# Patient Record
Sex: FEMALE | Race: WHITE | NOT HISPANIC OR LATINO | Employment: OTHER | URBAN - METROPOLITAN AREA
[De-identification: names, ages, dates, MRNs, and addresses within clinical notes are randomized per-mention and may not be internally consistent; named-entity substitution may affect disease eponyms.]

---

## 2017-01-09 ENCOUNTER — OFFICE VISIT (OUTPATIENT)
Dept: NEUROLOGY | Facility: CLINIC | Age: 22
End: 2017-01-09
Payer: COMMERCIAL

## 2017-01-09 ENCOUNTER — TELEPHONE (OUTPATIENT)
Dept: NEUROLOGY | Facility: CLINIC | Age: 22
End: 2017-01-09

## 2017-01-09 VITALS
HEART RATE: 131 BPM | BODY MASS INDEX: 28.15 KG/M2 | DIASTOLIC BLOOD PRESSURE: 80 MMHG | SYSTOLIC BLOOD PRESSURE: 131 MMHG | HEIGHT: 66 IN | WEIGHT: 175.13 LBS

## 2017-01-09 DIAGNOSIS — Z71.89 COUNSELING REGARDING GOALS OF CARE: ICD-10-CM

## 2017-01-09 DIAGNOSIS — G36.9 NEUROMYELITIS: ICD-10-CM

## 2017-01-09 DIAGNOSIS — F34.1 DYSTHYMIA: Primary | ICD-10-CM

## 2017-01-09 DIAGNOSIS — Z29.89 PROPHYLACTIC IMMUNOTHERAPY: ICD-10-CM

## 2017-01-09 DIAGNOSIS — F09 COGNITIVE DYSFUNCTION: ICD-10-CM

## 2017-01-09 DIAGNOSIS — Z79.899 ENCOUNTER FOR LONG-TERM (CURRENT) USE OF HIGH-RISK MEDICATION: ICD-10-CM

## 2017-01-09 PROCEDURE — 1159F MED LIST DOCD IN RCRD: CPT | Mod: S$GLB,,, | Performed by: PSYCHIATRY & NEUROLOGY

## 2017-01-09 PROCEDURE — 99215 OFFICE O/P EST HI 40 MIN: CPT | Mod: S$GLB,,, | Performed by: PSYCHIATRY & NEUROLOGY

## 2017-01-09 PROCEDURE — 99999 PR PBB SHADOW E&M-EST. PATIENT-LVL III: CPT | Mod: PBBFAC,,, | Performed by: PSYCHIATRY & NEUROLOGY

## 2017-01-09 RX ORDER — PREDNISONE 2.5 MG/1
TABLET ORAL
Qty: 210 TABLET | Refills: 0 | Status: SHIPPED | OUTPATIENT
Start: 2017-01-09 | End: 2017-05-12

## 2017-01-09 RX ORDER — FLUOXETINE 10 MG/1
10 CAPSULE ORAL DAILY
Qty: 30 CAPSULE | Refills: 11 | Status: SHIPPED | OUTPATIENT
Start: 2017-01-09 | End: 2017-02-06 | Stop reason: DRUGHIGH

## 2017-01-09 NOTE — MR AVS SNAPSHOT
[facility]  [address]  [facility] [address]  Phone: [phone]                  Nohemy Ladd   2017 1:00 PM   Office Visit    Description:  Female : 1995   Provider:  Jannet Guadarrama MD   Department:  [facility]           Reason for Visit     Neurologic Problem           Diagnoses this Visit        Comments    Dysthymia    -  Primary     Neuromyelitis                To Do List           Future Appointments        Provider Department Dept Phone    2017 11:30 AM SCOTT uSllivan, CNS [facility] [phone]      Goals (5 Years of Data)     None      Follow-Up and Disposition     Return in about 3 months (around 2017).       These Medications        Disp Refills Start End    fluoxetine (PROZAC) 10 MG capsule 30 capsule 11 2017    Take 1 capsule (10 mg total) by mouth once daily. - Oral    Pharmacy: [id number] - [place], [place] - [address] Ph #: [phone]       predniSONE (DELTASONE) 2.5 MG tablet 210 tablet 0 2017     Take po as directed as part of steroid taper over the next 3 months;    Pharmacy: [facility] #[id number] - [place], [place] - [address] Ph #: [phone]         [facility]     [facility] On [facility] Nurse Care Line -  Assistance  Registered nurses in the [facility] provide clinical advisement, health education, appointment booking, and other advisory services.  Call for this free service at [phone].             Medications           Message regarding Medications     Verify the changes and/or additions to your medication regime listed below are the same as discussed with your clinician today.  If any of these changes or additions are incorrect, please notify your healthcare provider.        START taking these NEW medications        Refills    fluoxetine (PROZAC) 10 MG capsule 11    Sig: Take 1 capsule (10 mg total) by mouth once daily.    Class:  "Normal    Route: Oral    predniSONE (DELTASONE) 2.5 MG tablet 0    Sig: Take po as directed as part of steroid taper over the next 3 months;    Class: Normal           Verify that the below list of medications is an accurate representation of the medications you are currently taking.  If none reported, the list may be blank. If incorrect, please contact your healthcare provider. Carry this list with you in case of emergency.           Current Medications     cyanocobalamin (VITAMIN B-12) 250 MCG tablet Take 1 tablet (250 mcg total) by mouth once daily.    norgestimate-ethinyl estradiol (ORTHO TRI-CYCLEN,TRI-SPRINTEC) 0.18/0.215/0.25 mg-35 mcg (28) tablet Take 1 tablet by mouth once daily.    pantoprazole (PROTONIX) 40 MG tablet Take 1 tablet (40 mg total) by mouth once daily.    senna-docusate 8.6-50 mg (PERICOLACE) 8.6-50 mg per tablet Take 1 tablet by mouth 2 (two) times daily.    fluoxetine (PROZAC) 10 MG capsule Take 1 capsule (10 mg total) by mouth once daily.    predniSONE (DELTASONE) 2.5 MG tablet Take po as directed as part of steroid taper over the next 3 months;           Clinical Reference Information           Vital Signs - Last Recorded  Most recent update: 1/9/2017  1:01 PM by Isi Lee MA    BP Pulse Ht Wt BMI    131/80 (!) 131 5' 6" (1.676 m) 79.4 kg (175 lb 1.6 oz) 28.26 kg/m2      Blood Pressure          Most Recent Value    BP  131/80      Allergies as of 1/9/2017     Pcn [Penicillins]      Immunizations Administered on Date of Encounter - 1/9/2017     None      Instructions    1. Jan 9-Feb 9th take 7.5mg (3 tabs of 2.5mg tabs) of prednisone daily  2. Feb 9-March 9th take 5mg (2 tabs) of prednisone daily  3. March 9th -April 9th take 2.5mg (1 tab) of prednisone daily  4. April 9th, discontinue prednisone       "

## 2017-01-09 NOTE — TELEPHONE ENCOUNTER
Call returned to Mariluz at pharmacy. Gave her taper dosing as below (from Dr Guadarrama's office note):    Jan 9-Feb 9th take 7.5mg (3 tabs of 2.5mg tabs) of prednisone daily  2. Feb 9-March 9th take 5mg (2 tabs) of prednisone daily  3. March 9th -April 9th take 2.5mg (1 tab) of prednisone daily  4. April 9th, discontinue prednisone

## 2017-01-09 NOTE — PROGRESS NOTES
"Subjective:       Patient ID: Nohemy Ladd is a 21 y.o. female who presents today for a routine clinic visit for NMO.  She is accompanied by her parents;     NMO HPI:  · DMT: Rituxan  · Side effects from DMT? No  · Taking vitamin D3 as recommended? No Dose:   · She feels like her cognition is getting better.   · She completed with NP testing  · She continues to take prednisone 10mg daily.   · Her mood is irritable, they say.   · She is done with physical, occupational, and speech therapy.   Her parents feel she is quicker to emotion that before--laughs more easily; cries more easily; never inappropriate;     SOCIAL HISTORY  Social History   Substance Use Topics    Smoking status: Never Smoker    Smokeless tobacco: None    Alcohol use Yes      Comment: socially     Living arrangements - the patient lives with her parents.  Employment N/A    NMO ROS:  · Fatigue: Yes - Not what it used to be, but improving.   · Sleep Disturbance: Yes - She has been waking up early, eats breakfast, then goes back to sleep.   · Bladder Dysfunction: No  · Bowel Dysfunction: No. She has several BMs, but denies diarrhea.   · Spasticity: No  · Visual Symptoms: No  · Cognitive: Yes - She feels like things are getting better.   · Mood Disorder: Yes - She denies depression and suicidal ideation, but feels like she is "more prone to tears." She has less interest in things, in general. She is not doing counseling.   · Gait Disturbance: No  · Falls: No  · Hand Dysfunction: No  · Pain: Yes - She has some generalized back pain, but more so in the upper back. When she is standing and leans over, she has pain in the lower back. This is new within the past 2-4 weeks.   · Sexual Dysfunction: Not Assessed  · Skin Breakdown: No  · Tremors: No  · Dysphagia:  No  · Dysarthria:  No  · Any un-met adaptive needs? N/A      Objective:        1. 25 foot timed walk: 4.49 seconds today without assist     Neurologic Exam     Mental Status   Oriented to person, " place, and time.   Attention: normal. Concentration: normal.   Speech: speech is normal   Level of consciousness: alert  Knowledge: good.     Cranial Nerves     CN II   Visual acuity: normal    CN III, IV, VI   Pupils are equal, round, and reactive to light.  Extraocular motions are normal.   Right pupil: Shape: regular. Reactivity: brisk.   Left pupil: Shape: regular. Reactivity: brisk.   CN III: no CN III palsy  CN VI: no CN VI palsy  Nystagmus: none     CN V   Right facial sensation deficit: none  Left facial sensation deficit: none    CN VII   Right facial weakness: none  Left facial weakness: none    CN VIII   Hearing: intact    CN IX, X   Palate: symmetric    CN XI   Right sternocleidomastoid strength: normal  Left sternocleidomastoid strength: normal  Right trapezius strength: normal  Left trapezius strength: normal    CN XII   Tongue deviation: none    Motor Exam   Muscle bulk: normal  Overall muscle tone: normal  Right arm pronator drift: absent  Left arm pronator drift: absent    Strength   Strength 5/5 throughout.   Right neck flexion: 5/5  Left neck flexion: 5/5  Right neck extension: 5/5  Left neck extension: 5/5  Right deltoid: 5/5  Left deltoid: 5/5  Right biceps: 5/5  Left biceps: 5/5  Right triceps: 5/5  Left triceps: 5/5  Right wrist flexion: 5/5  Left wrist flexion: 5/5  Right wrist extension: 5/5  Left wrist extension: 5/5  Right interossei: 5/5  Left interossei: 5/5  Right iliopsoas: 5/5  Left iliopsoas: 5/5  Right quadriceps: 5/5  Left quadriceps: 5/5  Right hamstrin/5  Left hamstrin/5  Right anterior tibial: 5/5  Left anterior tibial: 5/5  Right gastroc: 5/5  Left gastroc: 5/5    Sensory Exam   Right arm vibration: normal  Left arm vibration: normal  Right leg vibration: normal  Left leg vibration: normal    Gait, Coordination, and Reflexes     Gait  Gait: normal    Coordination   Romberg: negative  Finger to nose coordination: normal  Heel to shin coordination: normal  Tandem walking  coordination: normal    Tremor   Resting tremor: absent    Reflexes   Right brachioradialis: 1+  Left brachioradialis: 1+  Right biceps: 1+  Left biceps: 1+  Right patellar: 2+  Left patellar: 1+  Right achilles: 2+  Left achilles: 1+  Right plantar: equivocal  Left plantar: equivocal          20/20 OU   Imaging:   MRI Brain 12/2016 improved; no longer any enhancement;     Diagnosis/Assessment/Plan:    1. Neuromyelitis Optica  · Assessment: She is clinically improved, but not her her cognitive baseline.  I expect she will continue to improve significantly over the next year assuming no further relapses  · Imaging:planned December 2017  · Disease Modifying Therapies: continue Rituxan and low dose prednisone; tapering schedule for prednisone given--should be off by April 9th. Pt/family given info on the [facility]    2. NMO Symptom Assessment / Management  · Cognitive: NP testing results done by Dr Painting were discussed;   · Mood Disorder: will start Prozac 10mg/day; side effects discussed;       Over 50% of this 40 minute visit was spent in direct face to face counseling of the patient about her NMO and the management of her various symptoms.      Follow up with ASIA Woodson in 3 mo    Dysthymia  -     fluoxetine (PROZAC) 10 MG capsule; Take 1 capsule (10 mg total) by mouth once daily.  Dispense: 30 capsule; Refill: 11    Neuromyelitis  -     predniSONE (DELTASONE) 2.5 MG tablet; Take po as directed as part of steroid taper over the next 3 months;  Dispense: 210 tablet; Refill: 0

## 2017-01-09 NOTE — PATIENT INSTRUCTIONS
1. Jan 9-Feb 9th take 7.5mg (3 tabs of 2.5mg tabs) of prednisone daily  2. Feb 9-March 9th take 5mg (2 tabs) of prednisone daily  3. March 9th -April 9th take 2.5mg (1 tab) of prednisone daily  4. April 9th, discontinue prednisone

## 2017-01-09 NOTE — TELEPHONE ENCOUNTER
----- Message from William Younger sent at 1/9/2017  3:22 PM CST -----  Contact: [facility] [phone]  Caller is calling to get direction on the medication ( predniSONE (DELTASONE) 2.5 MG tablet)

## 2017-02-02 ENCOUNTER — NURSE TRIAGE (OUTPATIENT)
Dept: ADMINISTRATIVE | Facility: CLINIC | Age: 22
End: 2017-02-02

## 2017-02-02 NOTE — TELEPHONE ENCOUNTER
"    Reason for Disposition   Nursing judgment   [1] Caller requests to speak ONLY to PCP AND [2] URGENT question    Protocols used:  NO GUIDELINE OR REFERENCE [id number], ST PCP CALL - NO TRIAGE-JIM    Nohemy's mom, Donna, called to say she has been receiving texts from Nohemy while she has been at work (Nohemy is a  and is working right now) to say "she does not feel right.  I have hiccups, my head is tingling, and my vision is changing."  Nohemy has neuromyelitis optica, and has been doing well until today.  Donna wants to speak with Dr Guadarrama now, to get advice on what to do. Dr Guadarrama paged, briefed on Nohemy's symptoms and Donna's request, and she said she will call Donna back as soon as she is able to.  Message to Dr Guadarrama. Please contact caller directly with any additional care advice.    "

## 2017-02-06 ENCOUNTER — TELEPHONE (OUTPATIENT)
Dept: NEUROLOGY | Facility: CLINIC | Age: 22
End: 2017-02-06

## 2017-02-06 DIAGNOSIS — G36.0 NEUROMYELITIS OPTICA: Primary | ICD-10-CM

## 2017-02-06 DIAGNOSIS — F34.1 DYSTHYMIA: ICD-10-CM

## 2017-02-06 DIAGNOSIS — F32.89 OTHER DEPRESSION: Primary | ICD-10-CM

## 2017-02-06 NOTE — TELEPHONE ENCOUNTER
Nohemy started taking the Prozac 10 mg one month ago and she does not feel a difference. She is requesting an increase.

## 2017-02-06 NOTE — TELEPHONE ENCOUNTER
----- Message from Claire Nunes sent at 2/6/2017 10:02 AM CST -----  Contact: Bridget [phone]  Patient is calling to request a higher dosage on her fluoxetine (PROZAC) 10 MG capsule.       [id number] - [place], [place] - [address]  [address] [place] [address]  Phone: [phone] Fax: [phone]

## 2017-02-07 RX ORDER — FLUOXETINE HYDROCHLORIDE 20 MG/1
20 CAPSULE ORAL DAILY
Qty: 30 CAPSULE | Refills: 11 | Status: SHIPPED | OUTPATIENT
Start: 2017-02-07 | End: 2017-05-12

## 2017-02-13 ENCOUNTER — TELEPHONE (OUTPATIENT)
Dept: NEUROLOGY | Facility: CLINIC | Age: 22
End: 2017-02-13

## 2017-02-13 NOTE — TELEPHONE ENCOUNTER
----- Message from Claire Nunes sent at 2/13/2017 11:44 AM CST -----  Contact: Donna (mom) [phone]  Donna is calling to speak with nurse about some changes in her daughters behavior, please call

## 2017-02-14 NOTE — TELEPHONE ENCOUNTER
----- Message from Brisa Abel sent at 2/13/2017  4:30 PM CST -----  Contact: jose (mother) @ [phone]  pts mother says she is returning Augusta's call.

## 2017-02-14 NOTE — TELEPHONE ENCOUNTER
Call returned to mother (Donna). She states she is losing hope with Nohemy. She states she is still having some behavioral issues--same as the ones from before but increasing in severity. She has been extremely aggressive (not physically), cries easily, lashing out at family, obsessive, and clingy with her mother. She absolutely refuses to go to counseling, according to Donna. She has also seen no change in her mood since starting the Prozac in January.    Pt is working and driving at this time.    I informed Donna that there may not be a lot that this office can do if Nohemy is refusing to talk about her issues. We discussed that medication alone will only provide some relief without pt going to therapy of some sort. I offered reassurance and listened to her venting. I also informed her that this would be communicated to Dr Guadarrama, Juana Pedroza, and Dr Painting.

## 2017-02-15 ENCOUNTER — LAB VISIT (OUTPATIENT)
Dept: LAB | Facility: HOSPITAL | Age: 22
End: 2017-02-15
Payer: COMMERCIAL

## 2017-02-15 DIAGNOSIS — G36.0 NEUROMYELITIS OPTICA: ICD-10-CM

## 2017-02-15 LAB
ANISOCYTOSIS BLD QL SMEAR: SLIGHT
BASOPHILS NFR BLD: 1 %
DIFFERENTIAL METHOD: NORMAL
EOSINOPHIL NFR BLD: 0 %
ERYTHROCYTE [DISTWIDTH] IN BLOOD BY AUTOMATED COUNT: 14.1 %
HBV CORE AB SERPL QL IA: NEGATIVE
HBV SURFACE AB SER-ACNC: NEGATIVE M[IU]/ML
HBV SURFACE AG SERPL QL IA: NEGATIVE
HCT VFR BLD AUTO: 39.5 %
HCV AB SERPL QL IA: NEGATIVE
HEPATITIS A ANTIBODY, IGG: NEGATIVE
HGB BLD-MCNC: 12.9 G/DL
LYMPHOCYTES NFR BLD: 21 %
MCH RBC QN AUTO: 27.1 PG
MCHC RBC AUTO-ENTMCNC: 32.7 %
MCV RBC AUTO: 83 FL
METAMYELOCYTES NFR BLD MANUAL: 4 %
MONOCYTES NFR BLD: 6 %
MYELOCYTES NFR BLD MANUAL: 1 %
NEUTROPHILS NFR BLD: 66 %
NEUTS BAND NFR BLD MANUAL: 1 %
OVALOCYTES BLD QL SMEAR: NORMAL
PLATELET # BLD AUTO: 222 K/UL
PMV BLD AUTO: 9.5 FL
POIKILOCYTOSIS BLD QL SMEAR: SLIGHT
POLYCHROMASIA BLD QL SMEAR: NORMAL
RBC # BLD AUTO: 4.76 M/UL
WBC # BLD AUTO: 9.35 K/UL

## 2017-02-15 PROCEDURE — 86706 HEP B SURFACE ANTIBODY: CPT

## 2017-02-15 PROCEDURE — 86704 HEP B CORE ANTIBODY TOTAL: CPT

## 2017-02-15 PROCEDURE — 86803 HEPATITIS C AB TEST: CPT

## 2017-02-15 PROCEDURE — 36415 COLL VENOUS BLD VENIPUNCTURE: CPT

## 2017-02-15 PROCEDURE — 85027 COMPLETE CBC AUTOMATED: CPT

## 2017-02-15 PROCEDURE — 86356 MONONUCLEAR CELL ANTIGEN: CPT

## 2017-02-15 PROCEDURE — 85007 BL SMEAR W/DIFF WBC COUNT: CPT

## 2017-02-15 PROCEDURE — 87340 HEPATITIS B SURFACE AG IA: CPT

## 2017-02-15 PROCEDURE — 86790 VIRUS ANTIBODY NOS: CPT

## 2017-02-21 LAB — CD20 CELLS NFR SPEC: NORMAL %

## 2017-02-22 ENCOUNTER — TELEPHONE (OUTPATIENT)
Dept: NEUROLOGY | Facility: CLINIC | Age: 22
End: 2017-02-22

## 2017-02-22 NOTE — TELEPHONE ENCOUNTER
----- Message from Claire Nunes sent at 2/22/2017  3:28 PM CST -----  Contact: Donna ([facility] [place] [place]) [phone]  Donna is calling to speak with nurse about some concerning systems.

## 2017-02-22 NOTE — TELEPHONE ENCOUNTER
"I spoke with Nohemy who said she has been having the hiccups a lot lately. She said she hiccups once daily for the last 1-2 weeks. When asked how long each episode lasts, she said it's just "once." This happened before when she was hospitalized this last time. Last night she got a bad headache, but she took Ibuprofen and this relieved. She vomited once today. She said she is feeling better now. No signs of infection.     Reviewed symptoms with Dr. Guadarrama. Reassured Nohemy no need to worry at this time as hiccups only happened once a day and headache/vomiting are isolated incidents. Encouraged Nohemy to call if symptoms reoccur or get worse. Nohemy verbalized understanding.   "

## 2017-03-07 ENCOUNTER — TELEPHONE (OUTPATIENT)
Dept: NEUROLOGY | Facility: CLINIC | Age: 22
End: 2017-03-07

## 2017-03-08 ENCOUNTER — TELEPHONE (OUTPATIENT)
Dept: NEUROLOGY | Facility: CLINIC | Age: 22
End: 2017-03-08
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
## 2017-03-08 NOTE — TELEPHONE ENCOUNTER
----- Message from Jessa Contreras sent at 3/8/2017 10:55 AM CST -----  Contact: Mother  Need to speak with someone regarding patient condition. She also has a question regarding treatments.     Call: [phone]

## 2017-03-15 ENCOUNTER — TELEPHONE (OUTPATIENT)
Dept: NEUROLOGY | Facility: CLINIC | Age: 22
End: 2017-03-15

## 2017-03-15 RX ORDER — HEPARIN 100 UNIT/ML
500 SYRINGE INTRAVENOUS
Status: CANCELLED | OUTPATIENT
Start: 2017-04-01

## 2017-03-15 RX ORDER — SODIUM CHLORIDE 0.9 % (FLUSH) 0.9 %
10 SYRINGE (ML) INJECTION
Status: CANCELLED | OUTPATIENT
Start: 2017-04-01

## 2017-03-15 RX ORDER — ACETAMINOPHEN 325 MG/1
650 TABLET ORAL
Status: CANCELLED | OUTPATIENT
Start: 2017-04-01

## 2017-03-15 RX ORDER — FAMOTIDINE 10 MG/ML
20 INJECTION INTRAVENOUS
Status: CANCELLED | OUTPATIENT
Start: 2017-04-01

## 2017-03-23 ENCOUNTER — LAB VISIT (OUTPATIENT)
Dept: LAB | Facility: HOSPITAL | Age: 22
End: 2017-03-23
Payer: COMMERCIAL

## 2017-03-23 DIAGNOSIS — G36.0 NEUROMYELITIS OPTICA: ICD-10-CM

## 2017-03-23 LAB
ANISOCYTOSIS BLD QL SMEAR: SLIGHT
BASOPHILS # BLD AUTO: ABNORMAL K/UL
BASOPHILS NFR BLD: 2 %
DIFFERENTIAL METHOD: ABNORMAL
EOSINOPHIL # BLD AUTO: ABNORMAL K/UL
EOSINOPHIL NFR BLD: 1 %
ERYTHROCYTE [DISTWIDTH] IN BLOOD BY AUTOMATED COUNT: 14.9 %
HCT VFR BLD AUTO: 38.1 %
HGB BLD-MCNC: 12.2 G/DL
LYMPHOCYTES # BLD AUTO: ABNORMAL K/UL
LYMPHOCYTES NFR BLD: 18 %
MCH RBC QN AUTO: 26.6 PG
MCHC RBC AUTO-ENTMCNC: 32 %
MCV RBC AUTO: 83 FL
METAMYELOCYTES NFR BLD MANUAL: 2 %
MONOCYTES # BLD AUTO: ABNORMAL K/UL
MONOCYTES NFR BLD: 6 %
NEUTROPHILS NFR BLD: 70 %
NEUTS BAND NFR BLD MANUAL: 1 %
OVALOCYTES BLD QL SMEAR: ABNORMAL
PLATELET # BLD AUTO: 265 K/UL
PMV BLD AUTO: 9.7 FL
POIKILOCYTOSIS BLD QL SMEAR: SLIGHT
POLYCHROMASIA BLD QL SMEAR: ABNORMAL
RBC # BLD AUTO: 4.59 M/UL
WBC # BLD AUTO: 10.71 K/UL

## 2017-03-23 PROCEDURE — 36415 COLL VENOUS BLD VENIPUNCTURE: CPT

## 2017-03-23 PROCEDURE — 86356 MONONUCLEAR CELL ANTIGEN: CPT

## 2017-03-23 PROCEDURE — 85007 BL SMEAR W/DIFF WBC COUNT: CPT

## 2017-03-23 PROCEDURE — 85027 COMPLETE CBC AUTOMATED: CPT

## 2017-03-28 NOTE — TELEPHONE ENCOUNTER
Nohemy is scheduled for her next Rituxan infusion on 4/21 at [facility]. Her last infusion was on 10/18 and 11/01. Labs completed 3/23, WBC 10.71, ALC 1927, Rituxan sensitivity still pending, per [facility] Hepatitis labs negative. Therapy plan signed by Dr. Guadarrama.    Approved   Primary Insurance:  [facility] CHO[employer] PLUS   Authorization #:[id number]

## 2017-03-31 LAB — CD20 CELLS NFR SPEC: NORMAL %

## 2017-04-21 ENCOUNTER — INFUSION (OUTPATIENT)
Dept: INFUSION THERAPY | Facility: HOSPITAL | Age: 22
End: 2017-04-21
Attending: INTERNAL MEDICINE
Payer: COMMERCIAL

## 2017-04-21 VITALS
TEMPERATURE: 98 F | HEART RATE: 101 BPM | BODY MASS INDEX: 30.53 KG/M2 | SYSTOLIC BLOOD PRESSURE: 117 MMHG | WEIGHT: 190 LBS | DIASTOLIC BLOOD PRESSURE: 59 MMHG | RESPIRATION RATE: 16 BRPM | HEIGHT: 66 IN

## 2017-04-21 DIAGNOSIS — G36.0 NEUROMYELITIS OPTICA: Primary | ICD-10-CM

## 2017-04-21 PROCEDURE — 96375 TX/PRO/DX INJ NEW DRUG ADDON: CPT

## 2017-04-21 PROCEDURE — 25000003 PHARM REV CODE 250: Performed by: PSYCHIATRY & NEUROLOGY

## 2017-04-21 PROCEDURE — 96413 CHEMO IV INFUSION 1 HR: CPT

## 2017-04-21 PROCEDURE — 96415 CHEMO IV INFUSION ADDL HR: CPT

## 2017-04-21 PROCEDURE — 96367 TX/PROPH/DG ADDL SEQ IV INF: CPT

## 2017-04-21 PROCEDURE — 63600175 PHARM REV CODE 636 W HCPCS: Performed by: PSYCHIATRY & NEUROLOGY

## 2017-04-21 RX ORDER — ACETAMINOPHEN 325 MG/1
650 TABLET ORAL
Status: CANCELLED | OUTPATIENT
Start: 2017-04-21

## 2017-04-21 RX ORDER — SODIUM CHLORIDE 0.9 % (FLUSH) 0.9 %
10 SYRINGE (ML) INJECTION
Status: CANCELLED | OUTPATIENT
Start: 2017-04-21

## 2017-04-21 RX ORDER — HEPARIN 100 UNIT/ML
500 SYRINGE INTRAVENOUS
Status: CANCELLED | OUTPATIENT
Start: 2017-04-21

## 2017-04-21 RX ORDER — FAMOTIDINE 10 MG/ML
20 INJECTION INTRAVENOUS
Status: COMPLETED | OUTPATIENT
Start: 2017-04-21 | End: 2017-04-21

## 2017-04-21 RX ORDER — FAMOTIDINE 10 MG/ML
20 INJECTION INTRAVENOUS
Status: CANCELLED | OUTPATIENT
Start: 2017-04-21

## 2017-04-21 RX ORDER — ACETAMINOPHEN 325 MG/1
650 TABLET ORAL
Status: COMPLETED | OUTPATIENT
Start: 2017-04-21 | End: 2017-04-21

## 2017-04-21 RX ADMIN — RITUXIMAB 1000 MG: 10 INJECTION, SOLUTION INTRAVENOUS at 10:04

## 2017-04-21 RX ADMIN — DIPHENHYDRAMINE HYDROCHLORIDE 50 MG: 50 INJECTION, SOLUTION INTRAMUSCULAR; INTRAVENOUS at 09:04

## 2017-04-21 RX ADMIN — DEXTROSE: 50 INJECTION, SOLUTION INTRAVENOUS at 09:04

## 2017-04-21 RX ADMIN — ACETAMINOPHEN 650 MG: 325 TABLET ORAL at 09:04

## 2017-04-21 RX ADMIN — FAMOTIDINE 20 MG: 10 INJECTION INTRAVENOUS at 10:04

## 2017-04-21 NOTE — MR AVS SNAPSHOT
"Patient Information     Patient Name Sex Nohemy Breaux Female 1995      Visit Information        Provider Department Dep Phone Center    2017 9:00 AM [facility] [phone] Alexis Ocampo      Patient Instructions     None      Your Current Medications Are     cyanocobalamin (VITAMIN B-12) 250 MCG tablet    fluoxetine (PROZAC) 20 MG capsule    norgestimate-ethinyl estradiol (ORTHO TRI-CYCLEN,TRI-SPRINTEC) 0.18/0.215/0.25 mg-35 mcg (28) tablet    pantoprazole (PROTONIX) 40 MG tablet    predniSONE (DELTASONE) 2.5 MG tablet    senna-docusate 8.6-50 mg (PERICOLACE) 8.6-50 mg per tablet      Facility-Administered Medications     acetaminophen tablet 650 mg    diphenhydramine IVPB 50 mg    famotidine (PF) 20 mg/2 mL injection 20 mg    methylPREDNISolone sodium succinate (SOLU-MEDROL) 250 mg in dextrose 5 % 100 mL IVPB    rituximab (RITUXAN) 1,000 mg in sodium chloride 0.9% 1,000 mL infusion (conc: 1 mg/mL)      Appointments for Next Year     2017 10:30 AM ESTABLISHED PATIENT (60 min.) [facility] SCOTT Sullivan, CNS    Arrive at check-in approximately 15 minutes before your scheduled appointment time. Bring all outside medical records and imaging, along with a list of your current medications and insurance card.     Clinic tower 6th floor         Default Flowsheet Data (last 24 hours)      Amb Complex Vitals Florentino        17 1418 17 1231 17 1159 17 1127 17 1100    Measurements    BP --   rituxan completed 113/70   inc rituxan 114/65   rituxan inc to 300 114/69   rituxan increased to 200     Pulse  90 95 91     Resp  16  16        17 1053 17 0900             Measurements    Weight  86.2 kg (190 lb)       Height  5' 6" (1.676 m)       BSA (Calculated - sq m)  2 sq meters       BMI (Calculated)  30.7       /68   start rituxan at 100ml/hr 118/66       Temp  98.2 °F (36.8 °C)       Pulse 87 (!)  112       " Resp 20 20               Allergies     Pcn [Penicillins] Hives      Medications You Received from 04/20/2017 1419 to 04/21/2017 1419        Date/Time Order Dose Route Action     04/21/2017 0923 acetaminophen tablet 650 mg 650 mg Oral Given     04/21/2017 0956 diphenhydramine IVPB 50 mg 50 mg Intravenous New Bag     04/21/2017 1050 famotidine (PF) 20 mg/2 mL injection 20 mg 20 mg Intravenous Given     04/21/2017 0923 methylPREDNISolone sodium succinate (SOLU-MEDROL) 250 mg in dextrose 5 % 100 mL IVPB   Intravenous New Bag     04/21/2017 1054 rituximab (RITUXAN) 1,000 mg in sodium chloride 0.9% 1,000 mL infusion (conc: 1 mg/mL) 1,000 mg Intravenous New Bag      Current Discharge Medication List     Cannot display discharge medications since this is not an admission.

## 2017-04-21 NOTE — PLAN OF CARE
Problem: Chemotherapy Effects (Adult)  Goal: Signs and Symptoms of Listed Potential Problems Will be Absent, Minimized or Managed (Chemotherapy Effects)  Signs and symptoms of listed potential problems will be absent, minimized or managed by discharge/transition of care (reference Chemotherapy Effects (Adult) CPG).  Outcome: Ongoing (interventions implemented as appropriate)  Labs , hx, and medications reviewed. Assessment completed. Discussed plan of care with patient. Patient in agreement. VSS.  Chair reclined and warm blanket and snack offered. Will cont to monitor

## 2017-04-21 NOTE — NURSING
1020 pt completed benadryl IV & solumedrol prior - after benadryl developed rash to L arm (where PIV located). Appears hives like but not adjacent to IV or dressing & not along bobby. No SOB or other allergic complaints. IVF NS infusing & notified Ashley SARAVIA @ Dr Guadarrama office. Wait 20 min & continue with tx. Notify with any further problems.   1050 rash/hives completely resolved. Continued with pre meds & rituxan as ordered

## 2017-04-21 NOTE — PLAN OF CARE
Problem: Patient Care Overview  Goal: Discharge Needs Assessment  Outcome: Ongoing (interventions implemented as appropriate)  Pt tolerated rituxant without adverse effects. VSS. Provided AVS & verbalized understanding of RTC date. DC with family ambulating independently.

## 2017-04-28 ENCOUNTER — OFFICE VISIT (OUTPATIENT)
Dept: NEUROLOGY | Facility: CLINIC | Age: 22
End: 2017-04-28
Payer: COMMERCIAL

## 2017-04-28 VITALS
HEIGHT: 66 IN | HEART RATE: 112 BPM | SYSTOLIC BLOOD PRESSURE: 124 MMHG | WEIGHT: 188 LBS | BODY MASS INDEX: 30.22 KG/M2 | DIASTOLIC BLOOD PRESSURE: 94 MMHG

## 2017-04-28 DIAGNOSIS — R53.83 FATIGUE, UNSPECIFIED TYPE: ICD-10-CM

## 2017-04-28 DIAGNOSIS — F42.9 OBSESSIVE-COMPULSIVE DISORDER, UNSPECIFIED TYPE: ICD-10-CM

## 2017-04-28 DIAGNOSIS — F32.A DEPRESSION, UNSPECIFIED DEPRESSION TYPE: ICD-10-CM

## 2017-04-28 DIAGNOSIS — F41.9 ANXIETY: ICD-10-CM

## 2017-04-28 DIAGNOSIS — Z71.89 COUNSELING REGARDING GOALS OF CARE: ICD-10-CM

## 2017-04-28 DIAGNOSIS — G36.0 NEUROMYELITIS OPTICA: Primary | ICD-10-CM

## 2017-04-28 DIAGNOSIS — Z29.89 PROPHYLACTIC IMMUNOTHERAPY: ICD-10-CM

## 2017-04-28 DIAGNOSIS — Z79.899 HIGH RISK MEDICATION USE: ICD-10-CM

## 2017-04-28 DIAGNOSIS — L29.9 ITCHING: ICD-10-CM

## 2017-04-28 PROCEDURE — 99354 PR PROLONGED SVC, OUPT, 1ST HR: CPT | Mod: S$GLB,,, | Performed by: CLINICAL NURSE SPECIALIST

## 2017-04-28 PROCEDURE — 99215 OFFICE O/P EST HI 40 MIN: CPT | Mod: S$GLB,,, | Performed by: CLINICAL NURSE SPECIALIST

## 2017-04-28 PROCEDURE — 1160F RVW MEDS BY RX/DR IN RCRD: CPT | Mod: S$GLB,,, | Performed by: CLINICAL NURSE SPECIALIST

## 2017-04-28 PROCEDURE — 99999 PR PBB SHADOW E&M-EST. PATIENT-LVL III: CPT | Mod: PBBFAC,,, | Performed by: CLINICAL NURSE SPECIALIST

## 2017-04-28 RX ORDER — FLUOXETINE HYDROCHLORIDE 40 MG/1
40 CAPSULE ORAL DAILY
Qty: 30 CAPSULE | Refills: 5 | Status: SHIPPED | OUTPATIENT
Start: 2017-04-28 | End: 2017-07-24 | Stop reason: SDUPTHER

## 2017-04-28 RX ORDER — AMANTADINE HYDROCHLORIDE 100 MG/1
TABLET ORAL
Qty: 60 TABLET | Refills: 3 | Status: SHIPPED | OUTPATIENT
Start: 2017-04-28 | End: 2017-07-28

## 2017-04-28 RX ORDER — GABAPENTIN 100 MG/1
100 CAPSULE ORAL NIGHTLY
Qty: 30 CAPSULE | Refills: 5 | Status: SHIPPED | OUTPATIENT
Start: 2017-04-28 | End: 2017-09-21

## 2017-04-28 NOTE — PROGRESS NOTES
Subjective:       Patient ID: Nohemy Ladd is a 22 y.o. female who presents today for a routine clinic visit for NMO. She was last seen by Dr. Guadarrama in January. The history has been provided by the patient. She is accompanied by her parents.     NMO HPI:  · DMT: Rituxan last Friday   · Side effects from DMT? Yes - She had a rash right after she got Benadryl during her infusion  · Taking vitamin D3 as recommended? No   · She does not feel like Prozac is helping with her depression.   · She has not had a menstrual cycle since last September. She is following up with her OBGYN about this.   · She reports back pain and neck pain after her Rituxan infusion that lasted for a few days and persists somewhat today. She feels sore.   · She has tapered off of prednisone.   · She has itching all over her body, but denies any rash. She itches more when she is in public. Her parents wonder if anxiety provokes this.     SOCIAL HISTORY  Social History   Substance Use Topics    Smoking status: Never Smoker    Smokeless tobacco: None    Alcohol use Yes      Comment: socially     Living arrangements - the patient lives with their family.  Employment: works at [employer] a [employer], and also has a job at a [employer].     NMO ROS:  · Fatigue: Yes - She has fatigue that makes her feel unmotivated to do things.   · Sleep Disturbance: Yes - She wakes up really early in the morning, but is able to go back to sleep. She goes to bed around 10pm and sleeps throughout the day (not as much as she did previously)  · Bladder Dysfunction: Yes - She has more urinary frequency. No recent UTIs.   · Bowel Dysfunction: Yes - She has had increased gas lately. She has had two accidents during which she passes gas, and some stool comes out.  She also has more frequent bowel movements.   · Spasticity: No  · Visual Symptoms: No  · Cognitive: Yes - Her short term memory is impacted. She sometimes has trouble with word finding.   · Mood  Disorder: Yes - She does not feel like Prozac 20mg is helping. She has also started counseling. Her family reports a lot of emotional lability. She is also exhibiting some increased OCD tendencies.   · Pain: Yes - She has some foot pain.         Objective:        1. 25 foot timed walk: was 4.49 seconds at last visit without assist     Neurologic Exam     Mental Status   Oriented to person, place, and time.   Attention: normal. Concentration: normal.   Speech: speech is normal   Level of consciousness: alert  Knowledge: good.     Cranial Nerves     CN III, IV, VI   Pupils are equal, round, and reactive to light.  Extraocular motions are normal.   Right pupil: Shape: regular. Reactivity: brisk.   Left pupil: Shape: regular. Reactivity: brisk.   CN III: no CN III palsy  CN VI: no CN VI palsy  Nystagmus: none     CN V   Right facial sensation deficit: none  Left facial sensation deficit: none    CN VII   Right facial weakness: none  Left facial weakness: none    CN VIII   Hearing: intact    CN IX, X   Palate: symmetric    CN XI   Right sternocleidomastoid strength: normal  Left sternocleidomastoid strength: normal  Right trapezius strength: normal  Left trapezius strength: normal    CN XII   Tongue deviation: none    Motor Exam   Muscle bulk: normal  Overall muscle tone: normal    Strength   Strength 5/5 throughout.   Right neck flexion: 5/5  Left neck flexion: 5/5  Right neck extension: 5/5  Left neck extension: 5/5  Right deltoid: 5/5  Left deltoid: 5/5  Right biceps: 5/5  Left biceps: 5/5  Right triceps: 5/5  Left triceps: 5/5  Right wrist flexion: 5/5  Left wrist flexion: 5/5  Right wrist extension: 5/5  Left wrist extension: 5/5  Right interossei: 5/5  Left interossei: 5/5  Right iliopsoas: 5/5  Left iliopsoas: 5/5  Right quadriceps: 5/5  Left quadriceps: 5/5  Right hamstrin/5  Left hamstrin/5  Right anterior tibial: 5/5  Left anterior tibial: 5/5  Right gastroc: 5/5  Left gastroc: 5/5    Sensory Exam    Right arm vibration: normal  Left arm vibration: normal  Right leg vibration: normal  Left leg vibration: normal    Gait, Coordination, and Reflexes     Gait  Gait: normal    Coordination   Romberg: negative  Finger to nose coordination: normal  Tandem walking coordination: normal    Tremor   Resting tremor: absent    Reflexes   Right brachioradialis: 1+  Left brachioradialis: 1+  Right biceps: 1+  Left biceps: 1+  Right patellar: 1+  Left patellar: 1+  Right achilles: 1+  Left achilles: 1+  Right plantar: equivocal  Left plantar: equivocal              Labs:   CD19, 20=0 on 3/23/17.     Lab Results   Component Value Date    WBC 10.71 03/23/2017    HGB 12.2 03/23/2017    HCT 38.1 03/23/2017    MCV 83 03/23/2017     03/23/2017         Diagnosis/Assessment/Plan:    1. Neuromyelitis Optica  · Assessment: Physically, Nohemy is doing well. She continues to struggle with cognitive impairment, mostly with short-term memory. She was very tearful throughout our time today. Her parents are concerned about the personality changes she has exhibited over the past several months (very quick to anger, sometimes violent with siblings and mother, cries easily). We discussed that Nohemy has threatened suicide recently, but today, she denies that she has a plan or a means to carry it out. Her parents have medical power of , and I advised them that if Nohemy actively threatens suicide or seems to be a threat of any kind to herself or others, they should call 911 and have her brought to the emergency room. Her parents and Nohemy verbalized understanding of this. We also discussed some obsessive compulsive tendencies that she has been exhibiting, such as counting the number of times she swirls her makeup brush on her blush and then on her cheek, being sure that electric items in the home are unplugged, turning the radio dial a certain number of times, etc). Nohemy has had a major life change with the diagnosis of  neuromyelitis optica. She has depression related to this. She has also gained a significant amount of weight because of steroid use (she has been totally weaned as of 2.5 weeks ago), which is upsetting to her. Her parents are in the process of getting . She feels unsupported by her siblings. She and her parents have very different views of her life and mood, and my sense is that Nohemy feels misunderstood, while I can see that parents are extremely concerned about her well-being. I recommended family counseling, and they will look into this. I also think that Nohemy needs to establish care with a psychiatrist so that these multiple issues can be addressed properly. She seemed resistant to this initially, but was more open-minded towards the end of our visit. I will speak with Dr. Painting in neuropsychology to see if he has a recommendation of someone in our department here.   · Imaging: MRI brain planned for December, but may consider doing sooner if cognitive issues do not improve.   · Disease Modifying Therapies:Continue Rituxan. She is due for next infusion in October. It has been burdensome for them to come here to [facility] for required labs, so I have provided her with orders for CBC and Rituxan sensitivity due in May. She will take these to her local lab.     2. NMO Symptom Assessment / Management  · Fatigue: Will start amantadine 100mg in the morning and 100mg at noon if needed.   · Bladder Dysfunction: Will monitor.   · Bowel Dysfunction: Suspect that frequent bowel movements are a result of prednisone, but will need to look into this more.   · Cognitive: She continues to experience short-term memory issues. Will discuss with Dr. Guadarrama.   · Mood Disorder: Prozac increased to 40mg. I have entered a referral for the patient for psychiatry here at [facility]. She has multiple issues that need to be addressed (depression, anger, emotional lability, OCD). I will ask Dr. Chappell and Dr. Painting for a recommendation. She  also expressed interested in trying Celexa because her mother takes it and it has helped to control her weight. If Prozac increase is not helpful, we may consider this.   · Pain/Sensory: She has itching all over her body, but denies rash. Her mother will try using a different laundry detergent, but I suspect that this is neuropathic. Gabapentin 100mg at bedtime Rx sent to pharmacy.     Over 50% of this 90 minute visit was spent in direct face to face counseling of the patient about NMO, her mood, and the appropriate referrals made.  The patient and her parents agree with the plan of care. She will follow up with Dr. Guadarrama in 3 months.       There are no diagnoses linked to this encounter.

## 2017-04-28 NOTE — Clinical Note
Nohemy is not taking Vitamin D. Doesn't look like we have ever checked it. Do you think it's not necessary because she's NMO?

## 2017-04-28 NOTE — Clinical Note
Nohemy's parents are concerned that her cognition does not seem to be improving. Do you think it would be wise to get another MRI sooner than December?

## 2017-05-01 ENCOUNTER — TELEPHONE (OUTPATIENT)
Dept: NEUROLOGY | Facility: CLINIC | Age: 22
End: 2017-05-01

## 2017-05-01 DIAGNOSIS — F32.A DEPRESSION, UNSPECIFIED DEPRESSION TYPE: ICD-10-CM

## 2017-05-01 DIAGNOSIS — F42.9 OBSESSIVE-COMPULSIVE DISORDER, UNSPECIFIED TYPE: ICD-10-CM

## 2017-05-01 DIAGNOSIS — G36.0 NEUROMYELITIS OPTICA: Primary | ICD-10-CM

## 2017-05-04 ENCOUNTER — TELEPHONE (OUTPATIENT)
Dept: NEUROLOGY | Facility: CLINIC | Age: 22
End: 2017-05-04

## 2017-05-04 DIAGNOSIS — G36.0 NEUROMYELITIS OPTICA: Primary | ICD-10-CM

## 2017-05-04 NOTE — TELEPHONE ENCOUNTER
----- Message from Jannet Guadarrama MD sent at 5/4/2017  8:49 AM CDT -----  yes  ----- Message -----     From: SCOTT Sullivan, CNS     Sent: 5/3/2017   4:56 PM       To: Jannet Guadarrama MD, #    6 months from prior, right? This would be June.   ----- Message -----     From: Jannet Guadarrama MD     Sent: 5/2/2017   5:48 PM       To: SCOTT Sullivan, CNS    I don't think its necessary, but might give them peace of mind which would likely be valuable in this situation.  So maybe a 6 mo interval MRI  ----- Message -----     From: SCOTT Sullivan, CNS     Sent: 4/28/2017   2:22 PM       To: Jannet Guadarrama MD    Nohemy's parents are concerned that her cognition does not seem to be improving. Do you think it would be wise to get another MRI sooner than December?

## 2017-05-04 NOTE — TELEPHONE ENCOUNTER
Message left for Nohemy's mom to call me back regarding setting up 6 month brain MRI. Will also send lab orders in the mail for Vitamin D.

## 2017-05-04 NOTE — TELEPHONE ENCOUNTER
Nohemy has appt with psychiatry on 5/12. After follow for June is scheduled, we will plan to set up brain MRI for the same day. Her mother is aware. Order for Vitamin D lab sent to patient.

## 2017-05-09 ENCOUNTER — TELEPHONE (OUTPATIENT)
Dept: NEUROLOGY | Facility: CLINIC | Age: 22
End: 2017-05-09

## 2017-05-12 ENCOUNTER — OFFICE VISIT (OUTPATIENT)
Dept: PSYCHIATRY | Facility: CLINIC | Age: 22
End: 2017-05-12
Payer: COMMERCIAL

## 2017-05-12 VITALS
SYSTOLIC BLOOD PRESSURE: 121 MMHG | HEART RATE: 120 BPM | DIASTOLIC BLOOD PRESSURE: 65 MMHG | WEIGHT: 191 LBS | HEIGHT: 66 IN | BODY MASS INDEX: 30.7 KG/M2

## 2017-05-12 DIAGNOSIS — F43.23 ADJUSTMENT DISORDER WITH MIXED ANXIETY AND DEPRESSED MOOD: Primary | ICD-10-CM

## 2017-05-12 PROCEDURE — 99999 PR PBB SHADOW E&M-EST. PATIENT-LVL II: CPT | Mod: PBBFAC,,, | Performed by: PSYCHIATRY & NEUROLOGY

## 2017-05-12 PROCEDURE — 90792 PSYCH DIAG EVAL W/MED SRVCS: CPT | Mod: S$GLB,,, | Performed by: PSYCHIATRY & NEUROLOGY

## 2017-05-12 NOTE — PATIENT INSTRUCTIONS
"-Your diagnosis is "Adjustment Disorder with Depressed Mood and anxiety", this means you are having depressive symptoms and anxiety due to an acutely stressful situation. Your symptoms should resolve with time, but prozac can be helpful for your current symptoms and to make them go away faster.  -I suspect that the steroids, although necessary for your medical condition,  may have made your psychological symptoms worse.   -You should continue to notice improvement with the current dose of prozac over the next few weeks.   -discuss your absence of menstrual cycles with your ob-gyn and your primary care.  "

## 2017-05-12 NOTE — MR AVS SNAPSHOT
[facility] - Psychiatry  [address]  [facility] [address]  Phone: [phone]  Fax: [phone]                  Nohemy Ladd   2017 12:00 PM   Office Visit    Description:  Female : 1995   Provider:  Delfina Pelayo MD   Department:  [facility] - Psychiatry           Diagnoses this Visit        Comments    Adjustment disorder with mixed anxiety and depressed mood    -  Primary            To Do List           Future Appointments        Provider Department Dept Phone    2017 10:20 AM Jannet Guadarrama MD [facility]- Multiple Sclerosis [phone]      Goals (5 Years of Data)     None      [facility]     [facility] On Call Nurse Care Line -  Assistance  Unless otherwise directed by your provider, please contact [facility], our nurse care line that is available for  assistance.     Registered nurses in the [facility] On Call Center provide: appointment scheduling, clinical advisement, health education, and other advisory services.  Call: [phone] (toll free)               Medications           Message regarding Medications     Verify the changes and/or additions to your medication regime listed below are the same as discussed with your clinician today.  If any of these changes or additions are incorrect, please notify your healthcare provider.        STOP taking these medications     cyanocobalamin (VITAMIN B-12) 250 MCG tablet Take 1 tablet (250 mcg total) by mouth once daily.    norgestimate-ethinyl estradiol (ORTHO TRI-CYCLEN,TRI-SPRINTEC) 0.18/0.215/0.25 mg-35 mcg (28) tablet Take 1 tablet by mouth once daily.    pantoprazole (PROTONIX) 40 MG tablet Take 1 tablet (40 mg total) by mouth once daily.    predniSONE (DELTASONE) 2.5 MG tablet Take po as directed as part of steroid taper over the next 3 months;    senna-docusate 8.6-50 mg (PERICOLACE) 8.6-50 mg per tablet Take 1 tablet by mouth 2 (two) times daily.           Verify that the below list of  "medications is an accurate representation of the medications you are currently taking.  If none reported, the list may be blank. If incorrect, please contact your healthcare provider. Carry this list with you in case of emergency.           Current Medications     amantadine HCl 100 mg Tab Take 1 tab in the morning and a 2nd tab at noon if needed.    fluoxetine (PROZAC) 40 MG capsule Take 1 capsule (40 mg total) by mouth once daily.    gabapentin (NEURONTIN) 100 MG capsule Take 1 capsule (100 mg total) by mouth every evening.           Clinical Reference Information           Your Vitals Were     BP Pulse Height Weight BMI    121/65 120 5' 6" (1.676 m) 86.6 kg (191 lb) 30.83 kg/m2      Blood Pressure          Most Recent Value    BP  121/65      Allergies as of 5/12/2017     Pcn [Penicillins]      Immunizations Administered on Date of Encounter - 5/12/2017     None      Instructions    -Your diagnosis is "Adjustment Disorder with Depressed Mood and anxiety", this means you are having depressive symptoms and anxiety due to an acutely stressful situation. Your symptoms should resolve with time, but prozac can be helpful for your current symptoms and to make them go away faster.  -I suspect that the steroids, although necessary for your medical condition,  may have made your psychological symptoms worse.   -You should continue to notice improvement with the current dose of prozac over the next few weeks.   -discuss your absence of menstrual cycles with your ob-gyn and your primary care.       Language Assistance Services     ATTENTION: Language assistance services are available, free of charge. Please call [phone].      ATENCIÓN: Si habla elise, tiene a lucia disposición servicios gratuitos de asistencia lingüística. Llame al [phone].     [facility] Ý: N?u b?n nói Ti?ng Vi?t, có các d?ch v? h? tr? ngôn ng? mi?n phí dành cho b?n. G?i s? [phone].         [facility] complies with applicable Federal " civil rights laws and does not discriminate on the basis of race, color, national origin, age, disability, or sex.

## 2017-05-12 NOTE — PROGRESS NOTES
Ambulatory Psychiatry Clinic Initial MD Evaluation    ENCOUNTER DATE: 5/12/2017  SITE: [facility]  REFFERAL SOURCE: Kasia Doll APRN,*  LENGTH OF SESSION: 60 minutes    CHIEF COMPLAINT sadness, anxiety    HISTORY:  HISTORY OF PRESENTING ILLNESS   Nohemy Ladd is a 22 y.o. female with no past psychiatric history, 2016 diagnosis of neuromyelitis optica after 2 month hx of severe visual and cognitive symptoms, now s/p immunosuppressant and prednisone treatment, referred by neurology for evaluation of depressive sx.    Denies any past psychiatric or medical history before August 2016. Had just started [employer] at [employer] after completing associates degree at [employer]. Developed weakness and progresssive vision loss, as well as severe memory impairment. Pt notes inability to remember anything that happened from late August through October. Was initially diagnosied with [place] Fever at a hospital in [place], her parents later brought her to [facility] where she was hospitalized, treated and given diagnosis of Neuromyelitis optica based on symptoms, exam and demyelination on MRI. Neurological and cognitive sx gradually resolved over past 6 months, had been on steroids until one month ago. 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 Patient was unaware of this until she was discharged from the hospital in November. Had neuropsych testing done in December 2016 which showed verbal memory and executive functioning deficits, but it also noted that she was still early in her recovery from her illness.    Her father moved out and filed for divorce while she was dealing the above symptoms, didn't find  out about this until she was discahrged from hospital in October and was shocked.     Feels tired all the time, this is getting better. Has gained 70 lbs and this depresses her. Sleeps well. Denies anhedonia, but misses some of the activities she used to do. Concentration is good.     Is currently on namenda for fatigue, on prozac 40 mg, increased 2 weeks ago. Denies anxiety or depression prior to the past year.     ROS   Complete review of systems performed covering Constitutional, Eyes, ENT/Mouth, Cardiovascular, Respiratory, Gastrointestinal, Genitourinary, Musculoskeletal, Skin, Neurologic, Endocrine, and Allergy/Immune. Amenorrheic. Fatigue. Otherwise unremarkable.All other systems were negative.    Psych ROS covered elsewhere in note (HPI)      PAST PSYCHIATRIC HISTORY  No psych hospitalizations or suicide attempts.       SUBSTANCE ABUSE HISTORY   Denies tobacco or alcohol or drug use.       PAST MEDICAL HISTORY   Denies       MEDICATIONS   Scheduled and PRN Medications     Current Outpatient Prescriptions:     amantadine HCl 100 mg Tab, Take 1 tab in the morning and a 2nd tab at noon if needed., Disp: 60 tablet, Rfl: 3    cyanocobalamin (VITAMIN B-12) 250 MCG tablet, Take 1 tablet (250 mcg total) by mouth once daily., Disp: , Rfl:     fluoxetine (PROZAC) 20 MG capsule, Take 1 capsule (20 mg total) by mouth once daily., Disp: 30 capsule, Rfl: 11    fluoxetine (PROZAC) 40 MG capsule, Take 1 capsule (40 mg total) by mouth once daily., Disp: 30 capsule, Rfl: 5    gabapentin (NEURONTIN) 100 MG capsule, Take 1 capsule (100 mg total) by mouth every evening., Disp: 30 capsule, Rfl: 5    norgestimate-ethinyl estradiol (ORTHO TRI-CYCLEN,TRI-SPRINTEC) 0.18/0.215/0.25 mg-35 mcg (28) tablet, Take 1 tablet by mouth once daily., Disp: , Rfl:     pantoprazole (PROTONIX) 40 MG tablet, Take 1 tablet (40 mg total) by mouth once daily., Disp: 30 tablet, Rfl: 1    predniSONE (DELTASONE) 2.5 MG tablet, Take po as directed  "as part of steroid taper over the next 3 months;, Disp: 210 tablet, Rfl: 0    senna-docusate 8.6-50 mg (PERICOLACE) 8.6-50 mg per tablet, Take 1 tablet by mouth 2 (two) times daily., Disp: , Rfl:         ALLERGIES   Review of patient's allergies indicates:   Allergen Reactions    Pcn [penicillins] Hives         FAMILY PSYCHIATRIC HISTORY   Aunt and grandmother both had depression.      SOCIAL HISTORY  Single, lives with mother and sister 16 and brother 12. Has boyfriend, parents , associates degree, works in retail part time.    EXAM  VITALS   Vitals:    05/12/17 1151   BP: 121/65   Pulse: (!) 120   Weight: 86.6 kg (191 lb)   Height: 5' 6" (1.676 m)       RELEVANT LABS/STUDIES:    PSYCHIATRIC EXAMINATION  Appearance: well groomed, appearing healthy and of stated age    Behavior: cooperative, pleasant, no psychomotor agitation or retardation.  Speech: normal rate, rhythm, prosody, volume and amount  Mood: depressed  Affect: mildly dysphoric but smiles appropriately  Thought Process: linear, logical, goal directed  Thought Content: negative for suicidal ideation, homicidal ideation, delusions or hallucinations.  Associations: intact  Memory: grossly intact  Level of Consciousness/Orientation: grossly intact  Fund of Knowledge: good  Attention: good  Language: fluent, able to name abstract and concrete objects.  Insight: fair  Judgment: fair    Psychomotor signs: no involuntary movements or tremor  Gait: normal    Medical Decision Making    IMPRESSION   21 yo F with no past psychiatric history and recent onset neuromyelitis optica, recovering from her initial flare of disease, referred for treatment of depressive sx, in setting of her illness disrupting her education and personal life and of her parents . Per notes and patients own report had felt much more depressed earlier, which could be due to continuing effects of NMO or from steroids. Patient since discontinued from steroids and on prozac, " appears to be improving. No evidence of cognitive impairment on exam, patient answers questions appropriately, has good recall of recent events, apart from time during acute illness when she had memory loss and has 3/3 on delayed recall.      DIAGNOSES  Adjustment Disorder with depression and anxiety  R/O Substance induced mood disorder  R/O Mood disorder due to medical condition  R/o Minor neurocognitive impairment        PLAN  -continue prozac  -recommended therapy  -return in one month for monitoring. Encouraged pt to bring mother with her to appointment      ABILITY TO ADHERE TO TREATMENT PLAN  Fair

## 2017-05-15 ENCOUNTER — TELEPHONE (OUTPATIENT)
Dept: NEUROLOGY | Facility: CLINIC | Age: 22
End: 2017-05-15

## 2017-05-15 ENCOUNTER — TELEPHONE (OUTPATIENT)
Dept: PSYCHIATRY | Facility: CLINIC | Age: 22
End: 2017-05-15
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
## 2017-05-15 NOTE — TELEPHONE ENCOUNTER
----- Message from Brisa Abel sent at 5/15/2017  9:05 AM CDT -----  Contact: celia (father) @ [phone]  Would like to speak with someone concerning pts condition.  pls call.

## 2017-05-16 ENCOUNTER — TELEPHONE (OUTPATIENT)
Dept: NEUROLOGY | Facility: CLINIC | Age: 22
End: 2017-05-16

## 2017-05-16 DIAGNOSIS — R45.6 VIOLENT BEHAVIOR: Primary | ICD-10-CM

## 2017-05-16 DIAGNOSIS — G36.0 NEUROMYELITIS OPTICA: ICD-10-CM

## 2017-05-19 ENCOUNTER — OFFICE VISIT (OUTPATIENT)
Dept: PSYCHIATRY | Facility: CLINIC | Age: 22
End: 2017-05-19
Payer: COMMERCIAL

## 2017-05-19 VITALS
DIASTOLIC BLOOD PRESSURE: 70 MMHG | WEIGHT: 189 LBS | HEIGHT: 66 IN | HEART RATE: 107 BPM | BODY MASS INDEX: 30.37 KG/M2 | SYSTOLIC BLOOD PRESSURE: 115 MMHG

## 2017-05-19 DIAGNOSIS — F19.94 SUBSTANCE OR MEDICATION-INDUCED BIPOLAR AND RELATED DISORDER: ICD-10-CM

## 2017-05-19 DIAGNOSIS — G36.0 NEUROMYELITIS OPTICA: ICD-10-CM

## 2017-05-19 DIAGNOSIS — G31.84 MINOR NEUROCOGNITIVE DISORDER: ICD-10-CM

## 2017-05-19 DIAGNOSIS — F06.34 MOOD DISORDER WITH MIXED FEATURES DUE TO GENERAL MEDICAL CONDITION: Primary | ICD-10-CM

## 2017-05-19 PROCEDURE — 99999 PR PBB SHADOW E&M-EST. PATIENT-LVL II: CPT | Mod: PBBFAC,,, | Performed by: PSYCHIATRY & NEUROLOGY

## 2017-05-19 PROCEDURE — 99215 OFFICE O/P EST HI 40 MIN: CPT | Mod: S$GLB,,, | Performed by: PSYCHIATRY & NEUROLOGY

## 2017-05-19 RX ORDER — ARIPIPRAZOLE 2 MG/1
2 TABLET ORAL DAILY
Qty: 30 TABLET | Refills: 2 | Status: SHIPPED | OUTPATIENT
Start: 2017-05-19 | End: 2017-09-21
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
## 2017-05-22 ENCOUNTER — PATIENT MESSAGE (OUTPATIENT)
Dept: NEUROLOGY | Facility: CLINIC | Age: 22
End: 2017-05-22

## 2017-05-24 ENCOUNTER — DOCUMENTATION ONLY (OUTPATIENT)
Dept: NEUROLOGY | Facility: CLINIC | Age: 22
End: 2017-05-24

## 2017-05-24 NOTE — PROGRESS NOTES
Fax received from [facility] with vitamin D level. Vit D=21 (collected 5/24/17). Placed in Kasia's folder for review.

## 2017-05-25 PROBLEM — G31.84 MINOR NEUROCOGNITIVE DISORDER: Status: ACTIVE | Noted: 2017-05-25

## 2017-05-25 PROBLEM — F19.94 SUBSTANCE OR MEDICATION-INDUCED BIPOLAR AND RELATED DISORDER: Status: ACTIVE | Noted: 2017-05-25

## 2017-05-25 PROBLEM — F43.23 ADJUSTMENT DISORDER WITH MIXED ANXIETY AND DEPRESSED MOOD: Status: RESOLVED | Noted: 2017-05-12 | Resolved: 2017-05-25

## 2017-05-26 ENCOUNTER — TELEPHONE (OUTPATIENT)
Dept: NEUROLOGY | Facility: CLINIC | Age: 22
End: 2017-05-26

## 2017-05-26 ENCOUNTER — PATIENT MESSAGE (OUTPATIENT)
Dept: NEUROLOGY | Facility: CLINIC | Age: 22
End: 2017-05-26

## 2017-05-29 ENCOUNTER — PATIENT MESSAGE (OUTPATIENT)
Dept: NEUROLOGY | Facility: CLINIC | Age: 22
End: 2017-05-29

## 2017-05-30 ENCOUNTER — TELEPHONE (OUTPATIENT)
Dept: NEUROLOGY | Facility: CLINIC | Age: 22
End: 2017-05-30

## 2017-05-30 NOTE — TELEPHONE ENCOUNTER
Vit D=21 (result received from [facility]). Will send message to patient about supplementing with Vitamin D 10,000 units daily.

## 2017-06-06 ENCOUNTER — PATIENT MESSAGE (OUTPATIENT)
Dept: NEUROLOGY | Facility: CLINIC | Age: 22
End: 2017-06-06

## 2017-06-14 ENCOUNTER — HOSPITAL ENCOUNTER (OUTPATIENT)
Dept: RADIOLOGY | Facility: HOSPITAL | Age: 22
Discharge: HOME | End: 2017-06-14
Attending: CLINICAL NURSE SPECIALIST
Payer: COMMERCIAL

## 2017-06-14 DIAGNOSIS — G36.0 NEUROMYELITIS OPTICA: ICD-10-CM

## 2017-06-14 PROCEDURE — A9585 GADOBUTROL INJECTION: HCPCS | Performed by: CLINICAL NURSE SPECIALIST

## 2017-06-14 PROCEDURE — 70553 MRI BRAIN STEM W/O & W/DYE: CPT | Mod: 26,,, | Performed by: RADIOLOGY

## 2017-06-14 PROCEDURE — 70553 MRI BRAIN STEM W/O & W/DYE: CPT | Mod: TC

## 2017-06-14 PROCEDURE — 25500020 PHARM REV CODE 255: Performed by: CLINICAL NURSE SPECIALIST

## 2017-06-14 RX ORDER — GADOBUTROL 604.72 MG/ML
9 INJECTION INTRAVENOUS
Status: COMPLETED | OUTPATIENT
Start: 2017-06-14 | End: 2017-06-14

## 2017-06-14 RX ADMIN — GADOBUTROL 9 ML: 604.72 INJECTION INTRAVENOUS at 03:06

## 2017-06-21 ENCOUNTER — PATIENT MESSAGE (OUTPATIENT)
Dept: NEUROLOGY | Facility: CLINIC | Age: 22
End: 2017-06-21

## 2017-06-21 ENCOUNTER — TELEPHONE (OUTPATIENT)
Dept: NEUROLOGY | Facility: CLINIC | Age: 22
End: 2017-06-21

## 2017-06-23 NOTE — TELEPHONE ENCOUNTER
I spoke with pt's mother, Donna, this morning regarding [employer] disability process and how our center can help.  We will schedule a time to meet on a Wed - Friday in the next few weeks.  Donna driving at this time, so I will call her back later to arrange a date.

## 2017-06-26 NOTE — TELEPHONE ENCOUNTER
Nohemy and her mother will come on Wednesday, July 5th at 10am to complete an interview for [employer] disability.

## 2017-06-28 ENCOUNTER — PATIENT MESSAGE (OUTPATIENT)
Dept: NEUROLOGY | Facility: CLINIC | Age: 22
End: 2017-06-28

## 2017-07-05 ENCOUNTER — DOCUMENTATION ONLY (OUTPATIENT)
Dept: NEUROLOGY | Facility: CLINIC | Age: 22
End: 2017-07-05

## 2017-07-05 NOTE — PROGRESS NOTES
Met with Nohemy and her parents for an in-depth interview, concerning Nohemy's functional deficits since diagnosis of NMO, in order to assist Nohemy with her application for [employer]SA disability benefits.  I met with the family together, to explain the process and address any questions or concerns, and then, I met with Nohemy for an hour and her parents for 90 minutes.  A summary report will follow and will be scanned into Nohemy's medical record.

## 2017-07-14 ENCOUNTER — PATIENT MESSAGE (OUTPATIENT)
Dept: NEUROLOGY | Facility: CLINIC | Age: 22
End: 2017-07-14

## 2017-07-14 ENCOUNTER — TELEPHONE (OUTPATIENT)
Dept: NEUROLOGY | Facility: CLINIC | Age: 22
End: 2017-07-14

## 2017-07-14 NOTE — TELEPHONE ENCOUNTER
Phoned pt and explained that her summary report is complete and that we'll send it to [employer] in the next few days.  I also spoke to her about [place] [facility] and provided her with their phone number.  Her mother was showering but I agreed to phone her a little later today.

## 2017-07-21 ENCOUNTER — TELEPHONE (OUTPATIENT)
Dept: NEUROLOGY | Facility: CLINIC | Age: 22
End: 2017-07-21

## 2017-07-21 NOTE — TELEPHONE ENCOUNTER
Picked up medical summary report and it is ready to fax but pt's mother has not yet provided contact information for [employer] .  Phoned her today to request the information, which she will send through the portal later today.

## 2017-07-21 NOTE — TELEPHONE ENCOUNTER
----- Message from Delfina Pelayo MD sent at 7/20/2017  8:40 AM CDT -----  Regarding: RE: Report  So sorry, hectic week. I'm leaving it at the  of psychiatry ([facility] waiting room). Let me know if there's a better way to get it to you.    ----- Message -----  From: Juana Abad LCSW  Sent: 7/19/2017   3:17 PM  To: Delfina Pelayo MD  Subject: Report                                           Hi Dr. Pelayo,  I dropped off Nohemy's report for your review/signature on Monday (left with  at the ).  Have you had a chance to look it over?  I'd like to send it out this week, if possible.    Thanks!

## 2017-07-21 NOTE — TELEPHONE ENCOUNTER
Faxed medical summary letter to Octavio Riojas at [facility] [phone] after speaking to him by phone [phone] ext [phone].

## 2017-07-22 ENCOUNTER — PATIENT MESSAGE (OUTPATIENT)
Dept: NEUROLOGY | Facility: CLINIC | Age: 22
End: 2017-07-22

## 2017-07-24 DIAGNOSIS — F32.A DEPRESSION, UNSPECIFIED DEPRESSION TYPE: ICD-10-CM

## 2017-07-24 RX ORDER — FLUOXETINE HYDROCHLORIDE 40 MG/1
40 CAPSULE ORAL DAILY
Qty: 30 CAPSULE | Refills: 5 | Status: SHIPPED | OUTPATIENT
Start: 2017-07-24 | End: 2017-09-21

## 2017-07-28 ENCOUNTER — OFFICE VISIT (OUTPATIENT)
Dept: NEUROLOGY | Facility: CLINIC | Age: 22
End: 2017-07-28
Payer: COMMERCIAL

## 2017-07-28 VITALS
BODY MASS INDEX: 30.64 KG/M2 | DIASTOLIC BLOOD PRESSURE: 75 MMHG | SYSTOLIC BLOOD PRESSURE: 109 MMHG | HEIGHT: 66 IN | WEIGHT: 190.63 LBS | HEART RATE: 96 BPM

## 2017-07-28 DIAGNOSIS — Z29.89 PROPHYLACTIC IMMUNOTHERAPY: ICD-10-CM

## 2017-07-28 DIAGNOSIS — F09 COGNITIVE DYSFUNCTION: ICD-10-CM

## 2017-07-28 DIAGNOSIS — Z79.899 ENCOUNTER FOR LONG-TERM (CURRENT) USE OF HIGH-RISK MEDICATION: ICD-10-CM

## 2017-07-28 DIAGNOSIS — F41.9 ANXIETY: ICD-10-CM

## 2017-07-28 DIAGNOSIS — Z71.89 COUNSELING REGARDING ADVANCED CARE PLANNING AND GOALS OF CARE: ICD-10-CM

## 2017-07-28 DIAGNOSIS — G36.9 NEUROMYELITIS: Primary | ICD-10-CM

## 2017-07-28 PROCEDURE — 99215 OFFICE O/P EST HI 40 MIN: CPT | Mod: S$GLB,,, | Performed by: PSYCHIATRY & NEUROLOGY

## 2017-07-28 PROCEDURE — 99999 PR PBB SHADOW E&M-EST. PATIENT-LVL III: CPT | Mod: PBBFAC,,, | Performed by: PSYCHIATRY & NEUROLOGY

## 2017-07-28 NOTE — PROGRESS NOTES
Subjective:       Patient ID: Nohemy Ladd is a 22 y.o. female who presents today for a routine clinic visit for NMO.    HPI:  · DMT: Rituxan  · Side effects from DMT? NO   · Taking vitamin D3 as recommended? yes   · Pt was started on Abilify by Dr. Pelayo in May.  Her mood is a lot more stable;   · She continues to be very concerned about her weight;   · She has a lot of back pain and foot pain; she feels her abdomen is distended;     SOCIAL HISTORY  Social History   Substance Use Topics    Smoking status: Never Smoker    Smokeless tobacco: Not on file    Alcohol use Yes      Comment: socially     Living arrangements - the patient lives with her family        Objective:      25 foot timed walk: 4.2 seconds without assist    Neurologic Exam    MENTAL STATUS: grossly intact  CRANIAL NERVE EXAM: There is no internuclear ophthalmoplegia. Extraocular   muscles are intact.  No facial   asymmetry.There is no dysarthria.   MOTOR EXAM: Normal bulk and tone throughout UE and LE bilaterally. Rapid sequential movements are normal. Strength is 5/5 in all groups   in the lower extremities and upper extremities.   REFLEXES: Symmetric and 2+ throughout in all 4 extremities.   SENSORY EXAM: Normal to vibration t/o  COORDINATION: Normal finger-to-nose exam.   GAIT: Narrow based and stable.    Imaging:   MRI brain 6/2017 stable; no new or active lesions;    Diagnosis/Assessment/Plan:    1. NMO  · Assessment: Pt is clinically and radiographically stable on Rituxan.     · Imaging: will be planned again summer 2018  · Disease Modifying Therapies: conitnue Rituxan; The patient was counseled about the risks associated with immune suppressive therapy, including a higher risk of serious infections and malignancy, as well as the importance of avoiding all live virus vaccines while on immune suppressive medication.     2.  Symptom Assessment / Management  · Mood Disorder: continue to f/u with Dr. Pelayo;  Abilify has been very  helpful; recommended regular counseling locally  · Cognitive: currently impacted both by damage done by NMO relapse in 2016, and confounded further by anxiety and depression;  Suspect this will improve as her anxiety and depression improves with treatment    3. Plantar fascitis: advise she see local podiatrist    4. Generalized pain and tenderness: symptoms suggestive of fibromyalgia;  Suggest she start a regular swimming regimen    Over 50% of this 40 minute visit was spent in direct face to face counseling of the patient about her MS and the management of her various symptoms.        Neuromyelitis  -     CBC auto differential; Future; Expected date: 07/28/2017  -     Vitamin D; Future  -     Rituxan Sensitivity; Future; Expected date: 07/28/2017

## 2017-08-14 ENCOUNTER — DOCUMENTATION ONLY (OUTPATIENT)
Dept: NEUROLOGY | Facility: CLINIC | Age: 22
End: 2017-08-14

## 2017-08-22 ENCOUNTER — PATIENT MESSAGE (OUTPATIENT)
Dept: NEUROLOGY | Facility: CLINIC | Age: 22
End: 2017-08-22

## 2017-08-23 ENCOUNTER — TELEPHONE (OUTPATIENT)
Dept: NEUROLOGY | Facility: CLINIC | Age: 22
End: 2017-08-23

## 2017-08-23 DIAGNOSIS — G36.0 NEUROMYELITIS OPTICA: Primary | ICD-10-CM

## 2017-08-23 DIAGNOSIS — R11.10 INTRACTABLE VOMITING, PRESENCE OF NAUSEA NOT SPECIFIED, UNSPECIFIED VOMITING TYPE: ICD-10-CM

## 2017-08-23 NOTE — TELEPHONE ENCOUNTER
Rituxan sensitivity test from 8/3/17 with the following results: 0% of the lymphocytes express the CD19 phenotype. 0% of selected lymphocytes are positive for CD20.     CBC stable.     Vitamin D=30. Will reinforce that she take 10,000 units daily.

## 2017-08-23 NOTE — TELEPHONE ENCOUNTER
Would like to get new brain MRI with and without and MS protocol in next week; ordered here stat; pls schedule on NS

## 2017-08-23 NOTE — TELEPHONE ENCOUNTER
I received the following email from Nohemy's mom:    This is Nohemy's Mom, Donna.  Nohemy has been throwing up multiple times a day for a few weeks now.  It's becoming troublesome.  Not sure what's going on but it is concerning.     Also, she would like to know if she can take Adipex to lose weight.  I told her we would have to make sure through you if it was safe for her to take.

## 2017-09-06 ENCOUNTER — TELEPHONE (OUTPATIENT)
Dept: NEUROLOGY | Facility: CLINIC | Age: 22
End: 2017-09-06

## 2017-09-06 DIAGNOSIS — G36.0 NEUROMYELITIS OPTICA: Primary | ICD-10-CM

## 2017-09-06 NOTE — TELEPHONE ENCOUNTER
I spoke with Nohemy to schedule her September labs. Informed her that if they were done outside of [facility] they needed to be done Monday through Friday prior to noon. Nohemy said she started a new job and would not be able to take off to have labs done. I told her we order this lab at month 4 and 5 for our NMO patients to make sure they do not repopulate before their infusion as this puts them at risk of relapsing. Advised patient I would place the lab orders in the mail to her to have done closer to home. Nohemy verbalized understanding. Orders placed in Dr. Guadarrama's folder for signature.

## 2017-09-15 ENCOUNTER — TELEPHONE (OUTPATIENT)
Dept: NEUROLOGY | Facility: CLINIC | Age: 22
End: 2017-09-15

## 2017-09-15 NOTE — TELEPHONE ENCOUNTER
----- Message from Brisa Abel sent at 9/15/2017  3:09 PM CDT -----  Contact: jose (mother) @ [phone]  pts mother says they think pt is having a MS relapse and that she needs a mri scheduled asap   pls call.

## 2017-09-15 NOTE — TELEPHONE ENCOUNTER
Call placed to pt. She handed the phone to her mother. Donna states that pt has been sleeping constantly (so much so that she was fired today) and still has very aggressive mood swings and has frequent vomiting spells for the last 2 months. She did go to PCP for vomiting on Wednesday--mother did not know the results of that appt other than Nohemy was placed on an antidepressant. She would like a soon MRI. Informed Donna that an appt was made for MRI on 8/30, but Nohemy no showed. She states she didn't know about that appt. I reminded her that we made the appt together via [facility] messages. She admitted remembering this but couldn't get Nohemy to that appt. Offered an MRI appt at [facility] (this location per her request) tomorrow at 3:45pm. She states she cannot bring Nohemy to that one since her other child has a game that day. Several appt times given for MRI, and she chose 9/20 at 7:30pm. Urgent care appt with Ana Maria on Thursday at 1:45pm. She is aware of all dates/times. Informed her to go to ER if symptoms worsen. Verbalizes understanding of all.

## 2017-09-20 ENCOUNTER — HOSPITAL ENCOUNTER (OUTPATIENT)
Dept: RADIOLOGY | Facility: HOSPITAL | Age: 22
Discharge: HOME | End: 2017-09-20
Attending: PSYCHIATRY & NEUROLOGY
Payer: COMMERCIAL

## 2017-09-20 DIAGNOSIS — G36.0 NEUROMYELITIS OPTICA: ICD-10-CM

## 2017-09-20 DIAGNOSIS — R11.10 INTRACTABLE VOMITING, PRESENCE OF NAUSEA NOT SPECIFIED, UNSPECIFIED VOMITING TYPE: ICD-10-CM

## 2017-09-20 PROCEDURE — A9585 GADOBUTROL INJECTION: HCPCS | Performed by: PSYCHIATRY & NEUROLOGY

## 2017-09-20 PROCEDURE — 70553 MRI BRAIN STEM W/O & W/DYE: CPT | Mod: TC

## 2017-09-20 PROCEDURE — 70553 MRI BRAIN STEM W/O & W/DYE: CPT | Mod: 26,,, | Performed by: RADIOLOGY

## 2017-09-20 PROCEDURE — 25500020 PHARM REV CODE 255: Performed by: PSYCHIATRY & NEUROLOGY

## 2017-09-20 RX ORDER — GADOBUTROL 604.72 MG/ML
9 INJECTION INTRAVENOUS
Status: COMPLETED | OUTPATIENT
Start: 2017-09-20 | End: 2017-09-20

## 2017-09-20 RX ADMIN — GADOBUTROL 9 ML: 604.72 INJECTION INTRAVENOUS at 08:09

## 2017-09-21 ENCOUNTER — LAB VISIT (OUTPATIENT)
Dept: LAB | Facility: HOSPITAL | Age: 22
End: 2017-09-21
Payer: COMMERCIAL

## 2017-09-21 ENCOUNTER — OFFICE VISIT (OUTPATIENT)
Dept: NEUROLOGY | Facility: CLINIC | Age: 22
End: 2017-09-21
Payer: COMMERCIAL

## 2017-09-21 VITALS
SYSTOLIC BLOOD PRESSURE: 118 MMHG | DIASTOLIC BLOOD PRESSURE: 79 MMHG | HEART RATE: 103 BPM | WEIGHT: 196 LBS | HEIGHT: 66 IN | BODY MASS INDEX: 31.5 KG/M2

## 2017-09-21 DIAGNOSIS — Z71.89 COUNSELING REGARDING GOALS OF CARE: ICD-10-CM

## 2017-09-21 DIAGNOSIS — R11.2 INTRACTABLE VOMITING WITH NAUSEA, UNSPECIFIED VOMITING TYPE: ICD-10-CM

## 2017-09-21 DIAGNOSIS — G36.0 NEUROMYELITIS OPTICA: Primary | ICD-10-CM

## 2017-09-21 DIAGNOSIS — G36.0 NEUROMYELITIS OPTICA: ICD-10-CM

## 2017-09-21 LAB
BASOPHILS # BLD AUTO: 0.05 K/UL
BASOPHILS NFR BLD: 0.6 %
DIFFERENTIAL METHOD: ABNORMAL
EOSINOPHIL # BLD AUTO: 0.1 K/UL
EOSINOPHIL NFR BLD: 1.2 %
ERYTHROCYTE [DISTWIDTH] IN BLOOD BY AUTOMATED COUNT: 14.2 %
HCT VFR BLD AUTO: 39.3 %
HGB BLD-MCNC: 12.7 G/DL
LYMPHOCYTES # BLD AUTO: 2.3 K/UL
LYMPHOCYTES NFR BLD: 25.3 %
MCH RBC QN AUTO: 26.5 PG
MCHC RBC AUTO-ENTMCNC: 32.3 G/DL
MCV RBC AUTO: 82 FL
MONOCYTES # BLD AUTO: 0.6 K/UL
MONOCYTES NFR BLD: 6.2 %
NEUTROPHILS # BLD AUTO: 5.9 K/UL
NEUTROPHILS NFR BLD: 64.8 %
PLATELET # BLD AUTO: 261 K/UL
PMV BLD AUTO: 10.2 FL
RBC # BLD AUTO: 4.79 M/UL
WBC # BLD AUTO: 9.05 K/UL

## 2017-09-21 PROCEDURE — 86356 MONONUCLEAR CELL ANTIGEN: CPT

## 2017-09-21 PROCEDURE — 3008F BODY MASS INDEX DOCD: CPT | Mod: S$GLB,,, | Performed by: PHYSICIAN ASSISTANT

## 2017-09-21 PROCEDURE — 85025 COMPLETE CBC W/AUTO DIFF WBC: CPT

## 2017-09-21 PROCEDURE — 99999 PR PBB SHADOW E&M-EST. PATIENT-LVL III: CPT | Mod: PBBFAC,,, | Performed by: PHYSICIAN ASSISTANT

## 2017-09-21 PROCEDURE — 99215 OFFICE O/P EST HI 40 MIN: CPT | Mod: S$GLB,,, | Performed by: PHYSICIAN ASSISTANT

## 2017-09-21 PROCEDURE — 36415 COLL VENOUS BLD VENIPUNCTURE: CPT

## 2017-09-21 RX ORDER — HYDROXYZINE HYDROCHLORIDE 25 MG/1
25 TABLET, FILM COATED ORAL 4 TIMES DAILY PRN
COMMUNITY
End: 2019-07-05

## 2017-09-21 RX ORDER — BUPROPION HYDROCHLORIDE 150 MG/1
150 TABLET ORAL DAILY
COMMUNITY
End: 2018-06-19

## 2017-09-21 RX ORDER — ONDANSETRON HYDROCHLORIDE 8 MG/1
8 TABLET, FILM COATED ORAL EVERY 4 HOURS PRN
COMMUNITY
End: 2019-07-05

## 2017-09-21 NOTE — PROGRESS NOTES
Subjective:       Patient ID: Nohemy Ladd is a 22 y.o. female who presents today for a fit-in clinic visit for NMO.    NMO HPI:  · DMT:Rituxan  · Side effects from DMT? n/a  · Taking vitamin D3 as recommended? No    · Vomiting for last 2- 2 1/2 months ago daily and fatigue that started about slightly after  · PCP started Wellbutrin about one week ago for irritability  · Zofran 8mg  was given by PCP--only taking once maybe twice daily.  · Patient denies any sexual activity for a year, denies possibility of pregnancy    SOCIAL HISTORY  Social History   Substance Use Topics    Smoking status: Never Smoker    Smokeless tobacco: Not on file    Alcohol use Yes      Comment: socially     Living arrangements - the patient lives with their family.  Employment not working at present--    NMO ROS:  As above      Objective:        1. 25 foot timed walk:3.65s without assist, previous visit 4.49s without assist    Neurologic Exam    Mental Status   Oriented to person, place, and time.   Attention: normal. Concentration: normal.   Speech: speech is normal   Level of consciousness: alert  Knowledge: good.      Cranial Nerves   Acuity OD and OS 20/20 with Snellen hand held chart at 6 ft  CN III, IV, VI   Pupils are equal, round, and reactive to light.  Extraocular motions are normal.   Right pupil: Shape: regular. Reactivity: brisk.   Left pupil: Shape: regular. Reactivity: brisk.   CN III: no CN III palsy  CN VI: no CN VI palsy  Nystagmus: none      CN V   Right facial sensation deficit: none  Left facial sensation deficit: none     CN VII   Right facial weakness: none  Left facial weakness: none     CN VIII   Hearing: intact     CN IX, X   Palate: symmetric     CN XI   Right sternocleidomastoid strength: normal  Left sternocleidomastoid strength: normal  Right trapezius strength: normal  Left trapezius strength: normal     CN XII   Tongue deviation: none     Motor Exam   Muscle bulk: normal  Overall muscle tone:  normal     Strength   Strength 5/5 throughout.   Right neck flexion: 5/5  Left neck flexion: 5/5  Right neck extension: 5/5  Left neck extension: 5/5  Right deltoid: 5/5  Left deltoid: 5/5  Right biceps: 5/5  Left biceps: 5/5  Right triceps: 5/5  Left triceps: 5/5  Right wrist flexion: 5/5  Left wrist flexion: 5/5  Right wrist extension: 5/5  Left wrist extension: 5/5  Right interossei: 5/5  Left interossei: 5/5  Right iliopsoas: 5/5  Left iliopsoas: 5/5  Right quadriceps: 5/5  Left quadriceps: 5/5  Right hamstrin/5  Left hamstrin/5  Right anterior tibial: 5/5  Left anterior tibial: 5/5  Right gastroc: 5/5  Left gastroc: 5/5     Sensory Exam   Right arm vibration: mild impaired at fingers  Left arm vibration: mild impaired at fingers  Right leg vibration: mild impaired at toes  Left leg vibration: mild impaired at toes     Gait, Coordination, and Reflexes      Gait  Gait: normal     Coordination   Romberg: negative  Finger to nose coordination: normal  Tandem walking coordination: normal     Tremor   Resting tremor: absent     Reflexes   Right brachioradialis: 1+  Left brachioradialis: 1+  Right biceps: 1+  Left biceps: 1+  Right patellar: 1+  Left patellar: 1+  Right achilles: 1+  Left achilles: 1+  Right plantar: down  Left plantar: down       Patient vomited twice during our session today  Imaging:     Results for orders placed during the hospital encounter of 17   MRI Brain W WO Contrast    Narrative History: Neural myelitis operative.    Procedure: 3-D T2 flair sequences, axial T1, T2, diffusion weighted sequences are performed.  Multiplanar postcontrast studies are also then performed.    Comparison study: MRI 17, 2016.        Findings:    Again noted is encephalomalacia changes in the subependymal white matter along the left frontal horn with cystic changes.  There is an extension in the genu of the corpus callosum and in the lentiform nucleus.  Also encephalomalacia changes in the  left thalamus and extending to the hypothalamus noted.  These remain without significant change from the previous study.    In the rest of the corpus callosum no abnormal signal intensity lesions are identified.    No new white matter lesions identified.  Gray/white delineation is preserved throughout the cerebral hemispheres bilaterally.    Slight asymmetry of the lateral ventricles is felt to be a normal variant.  There is no midline shift.    Incidental note of a developmental venous anomaly noted in the right posterior temporal lobe with deep draining vein extending to the right internal cerebral vein.    Posterior fossa structures are unremarkable.  There are no suprasellar or pineal region masses or Chiari malformations.    Impression Stable MRI of the brain when compared with the previous study.  No new demyelinating white matter lesions as above.      Electronically signed by: CARRIE SUNSHINE MD  Date:     09/21/17  Time:    08:03          Labs:     Lab Results   Component Value Date    WBC 9.05 09/21/2017    RBC 4.79 09/21/2017    HGB 12.7 09/21/2017    HCT 39.3 09/21/2017    MCV 82 09/21/2017    MCH 26.5 (L) 09/21/2017    MCHC 32.3 09/21/2017    RDW 14.2 09/21/2017     09/21/2017    MPV 10.2 09/21/2017    GRAN 5.9 09/21/2017    GRAN 64.8 09/21/2017    LYMPH 2.3 09/21/2017    LYMPH 25.3 09/21/2017    MONO 0.6 09/21/2017    MONO 6.2 09/21/2017    EOS 0.1 09/21/2017    BASO 0.05 09/21/2017    EOSINOPHIL 1.2 09/21/2017    BASOPHIL 0.6 09/21/2017       No results found for: DVIHUNSR27NA  No results found for: JCVINDEX, JCVANTIBODY  No results found for: TY6NJCVX, ABSOLUTECD3, RR5VFHTP, ABSOLUTECD8, ZK7RJJYK, ABSOLUTECD4, LABCD48    Diagnosis/Assessment/Plan:    1. Neuromyelitis Optica  · Assessment: Patient presents for urgent care visit with prolonged nausea/vomiting for the past approx. 2 months. MRI done was stable along with her neuro exam. I would like to get a few more labs, but I do not have a high  suspicion that she is having an NMO relapse. I have advised that she take Zofran as prescribed by her PCP. I will plan to refer to GI if our testing is negative.   · Imaging:MRI stable as noted above.  · Disease Modifying Therapies: Rituxan as DMT    Over 50% of this 40 minute visit was spent in direct face to face counseling of the patient  And her mother regarding her current symptoms and management of same.  Will keep previous follow up appt.   Patient agreed to POC today.    Attending, Dr. Guadarrama, was available during today's encounter. Any change to plan along with cosign to appear in the EMR.     Ana Maria Todd PA-C  [facility]        Neuromyelitis optica  -     Comprehensive metabolic panel; Future; Expected date: 09/21/2017  -     Urinalysis, Reflex to Urine Culture Urine, Clean Catch

## 2017-09-22 ENCOUNTER — TELEPHONE (OUTPATIENT)
Dept: NEUROLOGY | Facility: CLINIC | Age: 22
End: 2017-09-22

## 2017-09-22 ENCOUNTER — PATIENT MESSAGE (OUTPATIENT)
Dept: NEUROLOGY | Facility: CLINIC | Age: 22
End: 2017-09-22

## 2017-09-22 DIAGNOSIS — R39.9 UTI SYMPTOMS: Primary | ICD-10-CM

## 2017-09-22 DIAGNOSIS — Z32.00 POSSIBLE PREGNANCY: ICD-10-CM

## 2017-09-22 DIAGNOSIS — R11.15 PERSISTENT RECURRENT VOMITING: Primary | ICD-10-CM

## 2017-09-22 RX ORDER — ACETAMINOPHEN 325 MG/1
650 TABLET ORAL
Status: CANCELLED | OUTPATIENT
Start: 2017-10-01

## 2017-09-22 RX ORDER — SODIUM CHLORIDE 0.9 % (FLUSH) 0.9 %
10 SYRINGE (ML) INJECTION
Status: CANCELLED | OUTPATIENT
Start: 2017-10-01

## 2017-09-22 RX ORDER — FAMOTIDINE 10 MG/ML
20 INJECTION INTRAVENOUS
Status: CANCELLED | OUTPATIENT
Start: 2017-10-01

## 2017-09-22 RX ORDER — HEPARIN 100 UNIT/ML
500 SYRINGE INTRAVENOUS
Status: CANCELLED | OUTPATIENT
Start: 2017-10-01

## 2017-09-27 ENCOUNTER — TELEPHONE (OUTPATIENT)
Dept: NEUROLOGY | Facility: CLINIC | Age: 22
End: 2017-09-27

## 2017-09-27 NOTE — TELEPHONE ENCOUNTER
Fina, let pt know she is scheduled for labs and to drop off a urine sample tomorrow either after or before her GI appt. Thanks.

## 2017-09-28 ENCOUNTER — OFFICE VISIT (OUTPATIENT)
Dept: GASTROENTEROLOGY | Facility: CLINIC | Age: 22
End: 2017-09-28
Payer: COMMERCIAL

## 2017-09-28 ENCOUNTER — LAB VISIT (OUTPATIENT)
Dept: LAB | Facility: HOSPITAL | Age: 22
End: 2017-09-28
Payer: COMMERCIAL

## 2017-09-28 VITALS
HEART RATE: 80 BPM | DIASTOLIC BLOOD PRESSURE: 70 MMHG | SYSTOLIC BLOOD PRESSURE: 108 MMHG | WEIGHT: 196 LBS | BODY MASS INDEX: 31.5 KG/M2 | HEIGHT: 66 IN

## 2017-09-28 DIAGNOSIS — R74.8 ELEVATED ALKALINE PHOSPHATASE LEVEL: ICD-10-CM

## 2017-09-28 DIAGNOSIS — R39.9 UTI SYMPTOMS: ICD-10-CM

## 2017-09-28 DIAGNOSIS — R11.2 INTRACTABLE VOMITING WITH NAUSEA, UNSPECIFIED VOMITING TYPE: Primary | ICD-10-CM

## 2017-09-28 DIAGNOSIS — G36.0 NEUROMYELITIS OPTICA: ICD-10-CM

## 2017-09-28 DIAGNOSIS — R12 HEARTBURN: ICD-10-CM

## 2017-09-28 DIAGNOSIS — R19.8 IRREGULAR BOWEL HABITS: ICD-10-CM

## 2017-09-28 DIAGNOSIS — R93.5 ABNORMAL CT OF THE ABDOMEN: ICD-10-CM

## 2017-09-28 DIAGNOSIS — K59.00 CONSTIPATION, UNSPECIFIED CONSTIPATION TYPE: ICD-10-CM

## 2017-09-28 LAB
BILIRUB UR QL STRIP: NEGATIVE
CLARITY UR: CLEAR
COLOR UR: YELLOW
GLUCOSE UR QL STRIP: NEGATIVE
HGB UR QL STRIP: NEGATIVE
KETONES UR QL STRIP: NEGATIVE
LEUKOCYTE ESTERASE UR QL STRIP: NEGATIVE
NITRITE UR QL STRIP: NEGATIVE
PH UR STRIP: 6 [PH] (ref 5–8)
PROT UR QL STRIP: NEGATIVE
SP GR UR STRIP: >=1.03 (ref 1–1.03)
URN SPEC COLLECT METH UR: ABNORMAL

## 2017-09-28 PROCEDURE — 99999 PR PBB SHADOW E&M-EST. PATIENT-LVL III: CPT | Mod: PBBFAC,,, | Performed by: NURSE PRACTITIONER

## 2017-09-28 PROCEDURE — 99243 OFF/OP CNSLTJ NEW/EST LOW 30: CPT | Mod: S$GLB,,, | Performed by: NURSE PRACTITIONER

## 2017-09-28 PROCEDURE — 87086 URINE CULTURE/COLONY COUNT: CPT

## 2017-09-28 PROCEDURE — 81003 URINALYSIS AUTO W/O SCOPE: CPT | Mod: PO

## 2017-09-28 RX ORDER — PANTOPRAZOLE SODIUM 40 MG/1
40 TABLET, DELAYED RELEASE ORAL DAILY
Qty: 30 TABLET | Refills: 6 | Status: ON HOLD | OUTPATIENT
Start: 2017-09-28 | End: 2017-10-13

## 2017-09-28 NOTE — PATIENT INSTRUCTIONS
"  Vomiting (Adult)  Vomiting is a common symptom that may be due to different causes. These include gastroenteritis ("stomach flu"), food poisoning and gastritis. There are other more serious causes of vomiting which may be hard to diagnose early in the illness. Therefore, it is important to watch for the warning signs listed below.  The main danger from repeated vomiting is dehydration. This is due to excess loss of water and minerals from the body. When this occurs, body fluids must be replaced.  Home care  · If symptoms are severe, rest at home for the next 24 hours.  · Because your symptoms may be from an infection, wash your hands frequently and well, and use alcohol-based  to avoid spreading the infection to others.  · Wash your hands for at least 20 seconds. Hum the happy birthday song twice for the correct length of time.  · Wash your hands after using the toilet, before and after preparing food, before eating food, after changing a diaper, cleaning a wound, caring for a sick person, and blowing your nose, coughing, or sneezing. You should also wash your hands after caring for someone who is sick, touching pet food, or treats, and touching an animal, or animal waste.  · You may use acetaminophen or NSAID medicines like ibuprofen or naproxen to control fever, unless another medicine was prescribed. If you have chronic liver or kidney disease or ever had a stomach ulcer or GI bleeding, talk with your doctor before using these medicines. Aspirin should never be used in anyone under 18 years of age who is ill with a fever. It may cause severe liver damage. Don't use NSAID medicines if you are already taking one for another condition (like arthritis) or are on aspirin (such as for heart disease, or after a stroke)  · Avoid tobacco and alcohol use, which may worsen your symptoms.  · If medicines for vomiting were prescribed, take as directed.  · Once vomiting stops, then follow these guidelines:  During " the first 12 to 24 hours follow the diet below:  · Fruit juices. Apple, grape juice, clear fruit drinks, and electrolyte replacement drinks.  · Beverages. Soft drinks without caffeine; mineral water (plain or flavored), decaffeinated tea and coffee.  · Soups. Clear broth, consommé and bouillon  · Desserts. Plain gelatin, popsicles and fruit juice bars. As you feel better, you may add 6-8 ounces of yogurt per day.  During the next 24 hours you may add the following to the above:  · Hot cereal, plain toast, bread, rolls, crackers  · Plain noodles, rice, mashed potatoes, chicken noodle or rice soup  · Unsweetened canned fruit (avoid pineapple), bananas  · Limit caffeine and chocolate. No spices or seasonings except salt.  During the next 24 hours:  Gradually resume a normal diet, as you feel better and your symptoms lessen.  Follow-up care  Follow up with your healthcare provider, or as advised.  When to seek medical advice  Call your healthcare provider right away if any of these occur:  · Constant right-sided lower abdominal pain or increasing general abdominal pain  · Continued vomiting (unable to keep liquids down) for 24 hours  · Frequent diarrhea (more than 5 times a day); blood (red or black color) or mucus in diarrhea  · Reduced urine output or extreme thirst  · Weakness, dizziness or fainting  · Unusually drowsy or confused  · Fever of 100.4°F (38°C) oral or higher, or as directed  · Yellow color of the eyes or skin  Date Last Reviewed: 11/16/2015 © 2000-2017 [employer]. [address] [place] [address], [place] [address]. All rights reserved. This information is not intended as a substitute for professional medical care. Always follow your healthcare professional's instructions.

## 2017-09-28 NOTE — PROGRESS NOTES
Subjective:       Patient ID: Nohemy Ladd is a 22 y.o. female Body mass index is 31.64 kg/m².    Chief Complaint: Other (vomiting every day for 3 months)    This patient is new to me; wants to establish care with one of the [facility] physicians.  Referring Provider:  Dr. DANISHA BLACKBURN for persistent, recurrent vomiting    Patient is here with her father, whom assisted with history.      Emesis    This is a new problem. Episode onset: started around 7/2017. Episode frequency: 1-3 times daily. The problem has been unchanged. The emesis has an appearance of stomach contents (denies bright red blood or coffee ground emesis). There has been no fever. Associated symptoms include diarrhea (denies currently) and headaches (occasional). Pertinent negatives include no abdominal pain, chest pain, chills, coughing, dizziness, fever or weight loss. Associated symptoms comments: Heartburn occasional. 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     Review of Systems   Constitutional: Negative for appetite change, chills, fatigue, fever, unexpected weight change and weight loss.   HENT: Negative for sore throat, trouble swallowing and voice change.    Respiratory: Negative for cough, choking and shortness of breath.    Cardiovascular: Negative for chest pain.   Gastrointestinal: Positive for constipation (currently; last bowel movement was 3 days ago), diarrhea (denies currently), nausea and vomiting. Negative for abdominal pain, anal bleeding, blood in stool and rectal pain.        Rotates between constipation and diarrhea over the past 3 weeks (started 3 weeks on medications: zofran Wellbutrin and atarax)   Neurological: Positive for headaches (occasional). Negative for dizziness and weakness.        Taking chemo drug Rituxan once every 6 months and reports due for next  dose next month- being managed by neurology       Past Medical History:   Diagnosis Date    B12 deficiency 9/25/2016    Brain lesion 9/25/2016    [place] spotted fever 9/25/2016     Past Surgical History:   Procedure Laterality Date    KNEE SURGERY Right     high school    TONSILLECTOMY       Family History   Problem Relation Age of Onset    Lymphoma Maternal Grandmother      CLL, diagnosed in 70s    Seizures Maternal Aunt      Epilepsy, PLEVA    Colon cancer Neg Hx     Colon polyps Neg Hx     Crohn's disease Neg Hx     Esophageal cancer Neg Hx     Stomach cancer Neg Hx     Ulcerative colitis Neg Hx      Wt Readings from Last 10 Encounters:   09/28/17 88.9 kg (196 lb)   09/21/17 88.9 kg (196 lb)   07/28/17 86.5 kg (190 lb 9.6 oz)   04/28/17 85.3 kg (188 lb)   04/21/17 86.2 kg (190 lb)   01/09/17 79.4 kg (175 lb 1.6 oz)   11/01/16 56.7 kg (125 lb)   10/18/16 55.3 kg (121 lb 14.6 oz)     Lab Results   Component Value Date    WBC 9.05 09/21/2017    HGB 12.7 09/21/2017    HCT 39.3 09/21/2017    MCV 82 09/21/2017     09/21/2017     CMP  Sodium   Date Value Ref Range Status   09/21/2017 141 136 - 145 mmol/L Final     Potassium   Date Value Ref Range Status   09/21/2017 4.4 3.5 - 5.1 mmol/L Final     Chloride   Date Value Ref Range Status   09/21/2017 107 95 - 110 mmol/L Final     CO2   Date Value Ref Range Status   09/21/2017 26 23 - 29 mmol/L Final     Glucose   Date Value Ref Range Status   09/21/2017 76 70 - 110 mg/dL Final     BUN, Bld   Date Value Ref Range Status   09/21/2017 8 6 - 20 mg/dL Final     Creatinine   Date Value Ref Range Status   09/21/2017 0.8 0.5 - 1.4 mg/dL Final     Calcium   Date Value Ref Range Status   09/21/2017 9.4 8.7 - 10.5 mg/dL Final     Total Protein   Date Value Ref Range Status   09/21/2017 7.5 6.0 - 8.4 g/dL Final     Albumin   Date Value Ref Range Status   09/21/2017 3.5 3.5 - 5.2 g/dL Final     Total Bilirubin   Date Value Ref Range Status   09/21/2017 0.3  0.1 - 1.0 mg/dL Final     Comment:     For infants and newborns, interpretation of results should be based  on gestational age, weight and in agreement with clinical  observations.  Premature Infant recommended reference ranges:  Up to 24 hours.............<8.0 mg/dL  Up to 48 hours............<12.0 mg/dL  3-5 days..................<15.0 mg/dL  6-29 days.................<15.0 mg/dL       Alkaline Phosphatase   Date Value Ref Range Status   09/21/2017 179 (H) 55 - 135 U/L Final     AST   Date Value Ref Range Status   09/21/2017 31 10 - 40 U/L Final     ALT   Date Value Ref Range Status   09/21/2017 51 (H) 10 - 44 U/L Final     Anion Gap   Date Value Ref Range Status   09/21/2017 8 8 - 16 mmol/L Final     eGFR if    Date Value Ref Range Status   09/21/2017 >60.0 >60 mL/min/1.73 m^2 Final     eGFR if non    Date Value Ref Range Status   09/21/2017 >60.0 >60 mL/min/1.73 m^2 Final     Comment:     Calculation used to obtain the estimated glomerular filtration  rate (eGFR) is the CKD-EPI equation. Since race is unknown   in our information system, the eGFR values for   -American and Non--American patients are given   for each creatinine result.       Lab Results   Component Value Date    TSH 1.094 09/25/2016     Reviewed prior medical records including radiology report of 10/6/16 ct abdomen pelvis, 10/4/16 pelvic ultrasound, & endoscopy history (see surgical history).    Objective:      Physical Exam   Constitutional: She is oriented to person, place, and time. She appears well-developed and well-nourished. No distress.   HENT:   Mouth/Throat: Oropharynx is clear and moist and mucous membranes are normal. No oral lesions. No oropharyngeal exudate.   Eyes: Conjunctivae are normal. Pupils are equal, round, and reactive to light. No scleral icterus.   Cardiovascular: Normal rate.    Pulmonary/Chest: Effort normal and breath sounds normal. No respiratory distress. She has no  wheezes.   Abdominal: Soft. Normal appearance and bowel sounds are normal. She exhibits no distension, no abdominal bruit and no mass. There is no hepatosplenomegaly. There is no tenderness. There is no rigidity, no rebound, no guarding, no tenderness at McBurney's point and negative Patel's sign. No hernia.   Neurological: She is alert and oriented to person, place, and time.   Skin: Skin is warm and dry. No rash noted. She is not diaphoretic. No erythema. No pallor.   Non-jaundiced   Psychiatric: She has a normal mood and affect. Her behavior is normal. Judgment and thought content normal.   Nursing note and vitals reviewed.      Assessment:       1. Intractable vomiting with nausea, unspecified vomiting type    2. Abnormal CT of the abdomen    3. Neuromyelitis optica    4. Irregular bowel habits    5. Constipation, unspecified constipation type    6. Heartburn    7. Elevated alkaline phosphatase level        Plan:       Intractable vomiting with nausea, unspecified vomiting type  - continue zofran as directed; patient declined prescription for phenergan  - schedule EGD, discussed procedure with patient, patient verbalized understanding    Abnormal CT of the abdomen  - schedule Colonoscopy, discussed procedure with the patient, patient verbalized understanding    Neuromyelitis optica  Recommend follow-up with Primary Care Provider/neurology for continued evaluation and management.    Irregular bowel habits  -     TSH; Future; Expected date: 09/28/2017  - schedule Colonoscopy, discussed procedure with the patient, patient verbalized understanding  - recommended OTC probiotics, such as Align, as directed  - avoid lactose    Constipation, unspecified constipation type  -     TSH; Future; Expected date: 09/28/2017  Recommended daily exercise as tolerated, adequate water intake (six 8-oz glasses of water daily), and high fiber diet. OTC fiber supplements are recommended if diet does not reach daily fiber goal (25  grams daily), such as Metamucil, Citrucel, or FiberCon (take as directed, separate from other oral medications by >2 hours).  -Recommend taking an OTC stool softener such as Colace as directed to avoid hard stools and straining with bowel movements PRN  -Recommend trying OTC MiraLax once daily (17g PO) as directed prn constipation  - If no improvement with above recommendations, try intermittently dosed Dulcolax OTC as directed (every 3-4 days) PRN to facilitate bowel movements  -If still no improvement with these measures, call/follow-up    Heartburn  -  START   pantoprazole (PROTONIX) 40 MG tablet; Take 1 tablet (40 mg total) by mouth once daily. Before breakfast  Dispense: 30 tablet; Refill: 6, - take in the morning before breakfast, discussed about possible long term use of medication (prefer to use lowest effective dose or discontinuing if possible), pt verbalized understanding  -discussed about the different types of medications used to treat reflux and how to use them, antacids can be used PRN for breakthrough heartburn symptoms by reducing stomach acid that is already produced, H2 blockers (zantac) work by limiting the amount acid production, & PPI's work to block acid production and are taken daily, patient verbalized understanding.  -Educated patient on lifestyle modifications to help control/reduce reflux/abdominal pain including: avoid large meals, avoid eating within 2-3 hours of bedtime (avoid late night eating & lying down soon after eating), elevate head of bed if nocturnal symptoms are present, smoking cessation (if current smoker), & weight loss (if overweight).   -Educated to avoid known foods which trigger reflux symptoms & to minimize/avoid high-fat foods, chocolate, caffeine, citrus, alcohol, & tomato products.  -Advised to avoid/limit use of NSAID's, since they can cause GI upset, bleeding, and/or ulcers. If needed, take with food.  - schedule EGD, discussed procedure with patient, patient  verbalized understanding    Elevated alkaline phosphatase level  Recommend follow-up with Primary Care Provider for continued evaluation and management.    Return in about 1 month (around 10/28/2017), or if symptoms worsen or fail to improve.      If no improvement in symptoms or symptoms worsen, call/follow-up at clinic or go to ER.

## 2017-09-28 NOTE — LETTER
September 28, 2017      Ana Maria Todd PA-C  [id number]  [facility] [address]           [facility] [address] [facility] [address] [place] [address]  Phone: [phone]          Patient: Nohemy Ladd   MR Number: [id number]   YOB: 1995   Date of Visit: 9/28/2017       Dear Ana Maria Todd:    Thank you for referring Nohemy Ladd to me for evaluation. Attached you will find relevant portions of my assessment and plan of care.    If you have questions, please do not hesitate to call me. I look forward to following Nohemy Ldad along with you.    Sincerely,    Tracy Beck, [facility]    Enclosure  CC:  No Recipients    If you would like to receive this communication electronically, please contact [email or web address] or [phone] to request more information on [email or web address] Link access.    For providers and/or their staff who would like to refer a patient to [facility], please contact us through our one-stop-shop provider referral line, Arturo Jenkins, at [phone].    If you feel you have received this communication in error or would no longer like to receive these types of communications, please e-mail [email or web address]

## 2017-09-29 LAB — BACTERIA UR CULT: NO GROWTH

## 2017-10-03 LAB — CD20 CELLS NFR SPEC: NORMAL %

## 2017-10-11 ENCOUNTER — ANESTHESIA EVENT (OUTPATIENT)
Dept: ENDOSCOPY | Facility: HOSPITAL | Age: 22
End: 2017-10-11
Payer: COMMERCIAL

## 2017-10-11 ENCOUNTER — TELEPHONE (OUTPATIENT)
Dept: NEUROLOGY | Facility: CLINIC | Age: 22
End: 2017-10-11

## 2017-10-11 NOTE — TELEPHONE ENCOUNTER
Nohemy is scheduled for her Rituxan infusion on Monday, 10/23/17 at [facility]. Pt aware of date and time. Her last infusion was on 4/21/17. Labs completed on 9/21/17- WBC 9.05, ALC 2300, CD19, 20=0. Therapy plan signed. Auth approved:      Approved   Primary Insurance:  [facility] CHOICE PLUS   Authorization #:   [id number] - 09/22/2017 - 12/31/2017 - [facility]    RITUXAN 1000 MG Q 2WKS X 2 DOS  APPROVED     RECEIVED APPROVAL INFORMATION FROM DIANNA VALDEZ ([facility] - [phone])

## 2017-10-12 NOTE — H&P
History & Physical - Short Stay  Gastroenterology      SUBJECTIVE:     Procedure: Gastroscopy    Chief Complaint/Indication for Procedure: Intractable vomiting with nausea    History of Present Illness:  Office Visit     9/28/2017  [facility]   [facility]   Gastroenterology   Intractable vomiting with nausea, unspecified vomiting type +6 more   Dx   Other ; Referred by Ana Maria Todd PA-C   Reason for Visit    Progress Notes        Subjective:       Patient ID: Nohemy Ladd is a 22 y.o. female Body mass index is 31.64 kg/m².     Chief Complaint: Other (vomiting every day for 3 months)     This patient is new to me; wants to establish care with one of the [facility] physicians.  Referring Provider:  Dr. DANISHA [facility] for persistent, recurrent vomiting     Patient is here with her father, whom assisted with history.     Emesis    This is a new problem. Episode onset: started around 7/2017. Episode frequency: 1-3 times daily. The problem has been unchanged. The emesis has an appearance of stomach contents (denies bright red blood or coffee ground emesis). There has been no fever. Associated symptoms include diarrhea (denies currently) and headaches (occasional). Pertinent negatives include no abdominal pain, chest pain, chills, coughing, dizziness, fever or weight loss. Associated symptoms comments: Heartburn occasional. 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   ASSESSMENT/PLAN:      Assessment:  Intractable vomiting with nausea     Plan: Gastroscopy     Anesthesia Plan:   MAC / General Anaesthesia     ASA Grade: ASA 2 - Patient with mild systemic disease with no functional limitations     MALLAMPATI SCORE: II (hard and soft palate, upper portion of tonsils anduvula visible)              PTA Medications   Medication Sig     "buPROPion (WELLBUTRIN XL) 150 MG TB24 tablet Take 150 mg by mouth once daily.    hydrOXYzine HCl (ATARAX) 25 MG tablet Take 25 mg by mouth 4 (four) times daily as needed for Itching.    ondansetron (ZOFRAN) 8 MG tablet Take 8 mg by mouth every 4 (four) hours as needed for Nausea.    [DISCONTINUED] pantoprazole (PROTONIX) 40 MG tablet Take 1 tablet (40 mg total) by mouth once daily. Before breakfast       Review of patient's allergies indicates:   Allergen Reactions    Pcn [penicillins] Hives        Past Medical History:   Diagnosis Date    B12 deficiency 9/25/2016    Brain lesion 9/25/2016    Neuromyelitis optica     Eliel Mountain spotted fever 9/25/2016     Past Surgical History:   Procedure Laterality Date    KNEE SURGERY Left     high school; meniscus repair    TONSILLECTOMY       Family History   Problem Relation Age of Onset    Lymphoma Maternal Grandmother      CLL, diagnosed in 70s    Seizures Maternal Aunt      Epilepsy, PLEVA    Colon cancer Neg Hx     Colon polyps Neg Hx     Crohn's disease Neg Hx     Esophageal cancer Neg Hx     Stomach cancer Neg Hx     Ulcerative colitis Neg Hx      Social History   Substance Use Topics    Smoking status: Current Every Day Smoker     Packs/day: 0.25     Types: Cigarettes    Smokeless tobacco: Never Used    Alcohol use Yes      Comment: socially         OBJECTIVE:     Vital Signs (Most Recent)  Temp: 97.7 °F (36.5 °C) (10/13/17 1213)  Pulse: 91 (10/13/17 1213)  Resp: 18 (10/13/17 1213)  BP: 127/69 (10/13/17 1213)  SpO2: 95 % (10/13/17 1213)    Physical Exam:                 :  Ht 5' 6" (1.676 m)    Wt 88.9 kg (196 lb)    BMI 31.64 kg/m²                             GENERAL:  Comfortable, in no acute distress.                                 HEENT EXAM:  Nonicteric.  No adenopathy.  Oropharynx is clear.          NECK:  Supple.                                                               LUNGS:  Clear.                                                   "             CARDIAC:  Regular rate and rhythm.  S1, S2.  No murmur.                      ABDOMEN:  Soft, positive bowel sounds, nontender.  No hepatosplenomegaly or masses.  No rebound or guarding.                     EXTREMITIES:  No edema.     MENTAL STATUS:  Alert and oriented.    ASSESSMENT/PLAN:     Assessment:  Intractable vomiting with nausea    Plan: Gastroscopy    Anesthesia Plan:   MAC / General Anaesthesia    ASA Grade: ASA 2 - Patient with mild systemic disease with no functional limitations    MALLAMPATI SCORE: II (hard and soft palate, upper portion of tonsils anduvula visible)

## 2017-10-13 ENCOUNTER — HOSPITAL ENCOUNTER (OUTPATIENT)
Facility: HOSPITAL | Age: 22
Discharge: HOME | End: 2017-10-13
Attending: INTERNAL MEDICINE | Admitting: INTERNAL MEDICINE
Payer: COMMERCIAL

## 2017-10-13 ENCOUNTER — SURGERY (OUTPATIENT)
Age: 22
End: 2017-10-13

## 2017-10-13 ENCOUNTER — ANESTHESIA (OUTPATIENT)
Dept: ENDOSCOPY | Facility: HOSPITAL | Age: 22
End: 2017-10-13
Payer: COMMERCIAL

## 2017-10-13 VITALS
OXYGEN SATURATION: 98 % | WEIGHT: 195 LBS | RESPIRATION RATE: 24 BRPM | TEMPERATURE: 98 F | HEIGHT: 66 IN | RESPIRATION RATE: 16 BRPM | SYSTOLIC BLOOD PRESSURE: 106 MMHG | BODY MASS INDEX: 31.34 KG/M2 | DIASTOLIC BLOOD PRESSURE: 64 MMHG | HEART RATE: 90 BPM

## 2017-10-13 DIAGNOSIS — R11.0 NAUSEA: ICD-10-CM

## 2017-10-13 LAB
B-HCG UR QL: NEGATIVE
CTP QC/QA: YES
H PYLORI INDEX VALUE: NEGATIVE

## 2017-10-13 PROCEDURE — 88305 TISSUE EXAM BY PATHOLOGIST: CPT | Mod: 26,,, | Performed by: PATHOLOGY

## 2017-10-13 PROCEDURE — 37000009 HC ANESTHESIA EA ADD 15 MINS: Mod: PO | Performed by: INTERNAL MEDICINE

## 2017-10-13 PROCEDURE — D9220A PRA ANESTHESIA: Mod: ANES,,, | Performed by: ANESTHESIOLOGY

## 2017-10-13 PROCEDURE — 63600175 PHARM REV CODE 636 W HCPCS: Mod: PO | Performed by: NURSE ANESTHETIST, CERTIFIED REGISTERED

## 2017-10-13 PROCEDURE — 43239 EGD BIOPSY SINGLE/MULTIPLE: CPT | Mod: PO | Performed by: INTERNAL MEDICINE

## 2017-10-13 PROCEDURE — 25000003 PHARM REV CODE 250: Mod: PO | Performed by: INTERNAL MEDICINE

## 2017-10-13 PROCEDURE — D9220A PRA ANESTHESIA: Mod: CRNA,,, | Performed by: NURSE ANESTHETIST, CERTIFIED REGISTERED

## 2017-10-13 PROCEDURE — 37000008 HC ANESTHESIA 1ST 15 MINUTES: Mod: PO | Performed by: INTERNAL MEDICINE

## 2017-10-13 PROCEDURE — 43239 EGD BIOPSY SINGLE/MULTIPLE: CPT | Mod: ,,, | Performed by: INTERNAL MEDICINE

## 2017-10-13 PROCEDURE — 81025 URINE PREGNANCY TEST: CPT | Mod: PO | Performed by: INTERNAL MEDICINE

## 2017-10-13 PROCEDURE — 87449 NOS EACH ORGANISM AG IA: CPT | Mod: PO | Performed by: INTERNAL MEDICINE

## 2017-10-13 PROCEDURE — 88305 TISSUE EXAM BY PATHOLOGIST: CPT | Performed by: PATHOLOGY

## 2017-10-13 PROCEDURE — 27201012 HC FORCEPS, HOT/COLD, DISP: Mod: PO | Performed by: INTERNAL MEDICINE

## 2017-10-13 RX ORDER — PROPOFOL 10 MG/ML
VIAL (ML) INTRAVENOUS
Status: DISCONTINUED | OUTPATIENT
Start: 2017-10-13 | End: 2017-10-13

## 2017-10-13 RX ORDER — LIDOCAINE HCL/PF 100 MG/5ML
SYRINGE (ML) INTRAVENOUS
Status: DISCONTINUED | OUTPATIENT
Start: 2017-10-13 | End: 2017-10-13

## 2017-10-13 RX ORDER — ONDANSETRON 2 MG/ML
INJECTION INTRAMUSCULAR; INTRAVENOUS
Status: DISCONTINUED | OUTPATIENT
Start: 2017-10-13 | End: 2017-10-13

## 2017-10-13 RX ORDER — SODIUM CHLORIDE, SODIUM LACTATE, POTASSIUM CHLORIDE, CALCIUM CHLORIDE 600; 310; 30; 20 MG/100ML; MG/100ML; MG/100ML; MG/100ML
INJECTION, SOLUTION INTRAVENOUS CONTINUOUS
Status: DISCONTINUED | OUTPATIENT
Start: 2017-10-13 | End: 2017-10-13 | Stop reason: HOSPADM

## 2017-10-13 RX ORDER — LIDOCAINE HYDROCHLORIDE 10 MG/ML
1 INJECTION INFILTRATION; PERINEURAL ONCE
Status: COMPLETED | OUTPATIENT
Start: 2017-10-13 | End: 2017-10-13

## 2017-10-13 RX ORDER — OMEPRAZOLE 40 MG/1
40 CAPSULE, DELAYED RELEASE ORAL
Qty: 30 CAPSULE | Refills: 11 | Status: SHIPPED | OUTPATIENT
Start: 2017-10-13 | End: 2020-08-21

## 2017-10-13 RX ADMIN — PROPOFOL 30 MG: 10 INJECTION, EMULSION INTRAVENOUS at 01:10

## 2017-10-13 RX ADMIN — PROPOFOL 30 MG: 10 INJECTION, EMULSION INTRAVENOUS at 12:10

## 2017-10-13 RX ADMIN — LIDOCAINE HYDROCHLORIDE 100 MG: 20 INJECTION, SOLUTION INTRAVENOUS at 12:10

## 2017-10-13 RX ADMIN — SODIUM CHLORIDE, SODIUM LACTATE, POTASSIUM CHLORIDE, AND CALCIUM CHLORIDE: .6; .31; .03; .02 INJECTION, SOLUTION INTRAVENOUS at 12:10

## 2017-10-13 RX ADMIN — ONDANSETRON 4 MG: 2 INJECTION, SOLUTION INTRAMUSCULAR; INTRAVENOUS at 12:10

## 2017-10-13 RX ADMIN — LIDOCAINE HYDROCHLORIDE 1 ML: 10 INJECTION, SOLUTION EPIDURAL; INFILTRATION; INTRACAUDAL; PERINEURAL at 12:10

## 2017-10-13 NOTE — OR NURSING
Pt and mother notified of MD delay of approximately 90 minutes. Offered comfort measures. Offered patient to reschedule procedure. Pt and mother state complete understanding and are OK to wait for procedure.

## 2017-10-13 NOTE — ANESTHESIA PREPROCEDURE EVALUATION
10/13/2017  Nohemy Ladd is a 22 y.o., female.    Anesthesia Evaluation      I have reviewed the Medications.     Review of Systems  Anesthesia Hx:  No problems with previous Anesthesia   Social:  Non-Smoker, No Alcohol Use    Cardiovascular:  Cardiovascular Normal     Pulmonary:  Pulmonary Normal    Renal/:  Renal/ Normal     Hepatic/GI:  Hepatic/GI Normal Persistent nausea and vomiting   Neurological:  Neurology Normal Neuromyelitis optica - past mental status changes and vision changes. Nothing currently.   Endocrine:  Endocrine Normal    Psych:   Psychiatric History States has felt suicidal in the past, but is not currently and has no plan of hurting herself.         Physical Exam  General:  Obesity    Airway/Jaw/Neck:  Airway Findings: Mouth Opening: Normal Tongue: Normal  General Airway Assessment: Adult, Average  Mallampati: II  Jaw/Neck Findings:  Neck ROM: Normal ROM      Dental:  Dental Findings: upper partial dentures   Chest/Lungs:  Chest/Lungs Findings: Clear to auscultation, Normal Respiratory Rate     Heart/Vascular:  Heart Findings: Rate: Normal  Rhythm: Regular Rhythm  Sounds: Normal  Heart murmur: negative       Mental Status:  Mental Status Findings:  Cooperative, Alert and Oriented         Anesthesia Plan  Type of Anesthesia, risks & benefits discussed:  Anesthesia Type:  general  Patient's Preference:   Intra-op Monitoring Plan:   Intra-op Monitoring Plan Comments:   Post Op Pain Control Plan:   Post Op Pain Control Plan Comments:   Induction:   IV  Beta Blocker:  Patient is not currently on a Beta-Blocker (No further documentation required).       Informed Consent: Patient understands risks and agrees with Anesthesia plan.  Questions answered. Anesthesia consent signed with patient.  ASA Score: 2     Day of Surgery Review of History & Physical:        Anesthesia Plan Notes:  Propofol general. Had a long discussion with patient and her mother regarding her psychiatric history. Strongly encouraged them to seek psychiatric help to deal with patient's depression.        Ready For Surgery From Anesthesia Perspective.

## 2017-10-13 NOTE — OR NURSING
Pt admitting she has been having suicidal thoughts recently, Pt denied active plan. She reports that physician recently increased  wellbutrin but does not feel it is helping. Notified Dr. Darnell.

## 2017-10-13 NOTE — ANESTHESIA POSTPROCEDURE EVALUATION
"Anesthesia Post Evaluation    Patient: Nohemy Ladd    Procedure(s) Performed: Procedure(s) (LRB):  ESOPHAGOGASTRODUODENOSCOPY (EGD) (N/A)    Final Anesthesia Type: general  Patient location during evaluation: PACU  Patient participation: Yes- Able to Participate  Level of consciousness: awake and alert and oriented  Post-procedure vital signs: reviewed and stable  Pain management: adequate  Airway patency: patent  PONV status at discharge: No PONV  Anesthetic complications: no      Cardiovascular status: hemodynamically stable and blood pressure returned to baseline  Respiratory status: unassisted, spontaneous ventilation and room air  Hydration status: euvolemic  Follow-up not needed.        Visit Vitals  /64 (BP Location: Left arm, Patient Position: Lying)   Pulse 90   Temp 36.7 °C (98 °F) (Skin)   Resp 16   Ht 5' 6" (1.676 m)   Wt 88.5 kg (195 lb)   LMP 09/16/2015   SpO2 98%   Breastfeeding? No   BMI 31.47 kg/m²       Pain/Denver Score: Pain Assessment Performed: Yes (10/13/2017  1:54 PM)  Presence of Pain: denies (10/13/2017  1:54 PM)  Denver Score: 10 (10/13/2017  1:54 PM)      "

## 2017-10-13 NOTE — BRIEF OP NOTE
Discharge Note  Short Stay      SUMMARY     Admit Date: 10/13/2017    Attending Physician: Ward Valiente Jr., MD     Discharge Physician: Ward Valiente Jr., MD    Discharge Date: 10/13/2017 1:39 PM    Final Diagnosis: Persistent vomiting [R11.10]    Minimal reflux esophagitis.  Normal stomach and duodenum.    Disposition: HOME OR SELF CARE    Patient Instructions:   Current Discharge Medication List      START taking these medications    Details   omeprazole (PRILOSEC) 40 MG capsule Take 1 capsule (40 mg total) by mouth before breakfast.  Qty: 30 capsule, Refills: 11         CONTINUE these medications which have NOT CHANGED    Details   buPROPion (WELLBUTRIN XL) 150 MG TB24 tablet Take 150 mg by mouth once daily.      hydrOXYzine HCl (ATARAX) 25 MG tablet Take 25 mg by mouth 4 (four) times daily as needed for Itching.      ondansetron (ZOFRAN) 8 MG tablet Take 8 mg by mouth every 4 (four) hours as needed for Nausea.         STOP taking these medications       pantoprazole (PROTONIX) 40 MG tablet Comments:   Reason for Stopping:               Discharge Procedure Orders (must include Diet, Follow-up, Activity)    Follow Up:  Follow up with PCP as per your routine.  Please follow an anti reflux diet.  Activity as tolerated.    No driving day of procedure.

## 2017-10-13 NOTE — TRANSFER OF CARE
"Anesthesia Transfer of Care Note    Patient: Nohemy Ladd    Procedure(s) Performed: Procedure(s) (LRB):  ESOPHAGOGASTRODUODENOSCOPY (EGD) (N/A)    Patient location: PACU    Anesthesia Type: general    Transport from OR: Transported from OR on room air with adequate spontaneous ventilation    Post pain: adequate analgesia    Post assessment: no apparent anesthetic complications and tolerated procedure well    Post vital signs: stable    Level of consciousness: awake and alert    Nausea/Vomiting: no nausea/vomiting    Complications: none    Transfer of care protocol was followed      Last vitals:   Visit Vitals  /69 (BP Location: Right arm, Patient Position: Lying)   Pulse 91   Temp 36.5 °C (97.7 °F) (Temporal)   Resp 18   Ht 5' 6" (1.676 m)   Wt 88.5 kg (195 lb)   LMP 09/16/2015   SpO2 95%   Breastfeeding? No   BMI 31.47 kg/m²     "

## 2017-10-13 NOTE — DISCHARGE INSTRUCTIONS
Recovery After Procedural Sedation (Adult)  You have been given medicine by vein to make you sleep during your surgery. This may have included both a pain medicine and sleeping medicine. Most of the effects have worn off. But you may still have some drowsiness for the next 6 to 8 hours.  Home care  Follow these guidelines when you get home:  · For the next 8 hours, you should be watched by a responsible adult. This person should make sure your condition is not getting worse.  · Don't drink any alcohol for the next 24 hours.  · Don't drive, operate dangerous machinery, or make important business or personal decisions during the next 24 hours.  Note: Your healthcare provider may tell you not to take any medicine by mouth for pain or sleep in the next 4 hours. These medicines may react with the medicines you were given in the hospital. This could cause a much stronger response than usual.  Follow-up care  Follow up with your healthcare provider if you are not alert and back to your usual level of activity within 12 hours.  When to seek medical advice  Call your healthcare provider right away if any of these occur:  · Drowsiness gets worse  · Weakness or dizziness gets worse  · Repeated vomiting  · You can't be awakened   Date Last Reviewed: 10/18/2016  © 9173-0952 [employer]. [address] [place] [address], [place] [address]. All rights reserved. This information is not intended as a substitute for professional medical care. Always follow your healthcare professional's instructions.        Tips to Control Acid Reflux    To control acid reflux, youll need to make some basic diet and lifestyle changes. The simple steps outlined below may be all youll need to ease discomfort.  Watch what you eat  · Avoid fatty foods and spicy foods.  · Eat fewer acidic foods, such as citrus and tomato-based foods. These can increase symptoms.  · Limit drinking alcohol, caffeine, and fizzy beverages. All increase acid  reflux.  · Try limiting chocolate, peppermint, and spearmint. These can worsen acid reflux in some people.  Watch when you eat  · Avoid lying down for 3 hours after eating.  · Do not snack before going to bed.  Raise your head  Raising your head and upper body by 4 to 6 inches helps limit reflux when youre lying down. Put blocks under the head of your bed frame to raise it.  Other changes  · Lose weight, if you need to  · Dont exercise near bedtime  · Avoid tight-fitting clothes  · Limit aspirin and ibuprofen  · Stop smoking   Date Last Reviewed: 7/1/2016  © 1965-8070 [employer]. [address] [place] [address], [place] [address]. All rights reserved. This information is not intended as a substitute for professional medical care. Always follow your healthcare professional's instructions.

## 2017-10-23 ENCOUNTER — INFUSION (OUTPATIENT)
Dept: INFUSION THERAPY | Facility: HOSPITAL | Age: 22
End: 2017-10-23
Attending: PSYCHIATRY & NEUROLOGY
Payer: COMMERCIAL

## 2017-10-23 VITALS
BODY MASS INDEX: 31.36 KG/M2 | SYSTOLIC BLOOD PRESSURE: 123 MMHG | WEIGHT: 195.13 LBS | DIASTOLIC BLOOD PRESSURE: 80 MMHG | TEMPERATURE: 98 F | HEIGHT: 66 IN | RESPIRATION RATE: 18 BRPM | HEART RATE: 89 BPM

## 2017-10-23 DIAGNOSIS — G36.0 NEUROMYELITIS OPTICA: Primary | ICD-10-CM

## 2017-10-23 PROCEDURE — S0028 INJECTION, FAMOTIDINE, 20 MG: HCPCS | Performed by: PSYCHIATRY & NEUROLOGY

## 2017-10-23 PROCEDURE — 63600175 PHARM REV CODE 636 W HCPCS: Performed by: PSYCHIATRY & NEUROLOGY

## 2017-10-23 PROCEDURE — 96415 CHEMO IV INFUSION ADDL HR: CPT

## 2017-10-23 PROCEDURE — 25000003 PHARM REV CODE 250: Performed by: PSYCHIATRY & NEUROLOGY

## 2017-10-23 PROCEDURE — 96413 CHEMO IV INFUSION 1 HR: CPT

## 2017-10-23 PROCEDURE — 96375 TX/PRO/DX INJ NEW DRUG ADDON: CPT

## 2017-10-23 PROCEDURE — 96367 TX/PROPH/DG ADDL SEQ IV INF: CPT

## 2017-10-23 RX ORDER — FAMOTIDINE 10 MG/ML
20 INJECTION INTRAVENOUS
Status: COMPLETED | OUTPATIENT
Start: 2017-10-23 | End: 2017-10-23

## 2017-10-23 RX ORDER — ACETAMINOPHEN 325 MG/1
650 TABLET ORAL
Status: COMPLETED | OUTPATIENT
Start: 2017-10-23 | End: 2017-10-23

## 2017-10-23 RX ORDER — ACETAMINOPHEN 325 MG/1
650 TABLET ORAL
Status: CANCELLED | OUTPATIENT
Start: 2017-10-23

## 2017-10-23 RX ORDER — SODIUM CHLORIDE 0.9 % (FLUSH) 0.9 %
10 SYRINGE (ML) INJECTION
Status: CANCELLED | OUTPATIENT
Start: 2017-10-23

## 2017-10-23 RX ORDER — FAMOTIDINE 10 MG/ML
20 INJECTION INTRAVENOUS
Status: CANCELLED | OUTPATIENT
Start: 2017-10-23

## 2017-10-23 RX ORDER — HEPARIN 100 UNIT/ML
500 SYRINGE INTRAVENOUS
Status: CANCELLED | OUTPATIENT
Start: 2017-10-23

## 2017-10-23 RX ADMIN — RITUXIMAB 1000 MG: 10 INJECTION, SOLUTION INTRAVENOUS at 09:10

## 2017-10-23 RX ADMIN — ACETAMINOPHEN 650 MG: 325 TABLET ORAL at 08:10

## 2017-10-23 RX ADMIN — FAMOTIDINE 20 MG: 10 INJECTION INTRAVENOUS at 08:10

## 2017-10-23 RX ADMIN — SODIUM CHLORIDE: 900 INJECTION, SOLUTION INTRAVENOUS at 08:10

## 2017-10-23 RX ADMIN — DEXTROSE: 50 INJECTION, SOLUTION INTRAVENOUS at 08:10

## 2017-10-23 RX ADMIN — DIPHENHYDRAMINE HYDROCHLORIDE 50 MG: 50 INJECTION, SOLUTION INTRAMUSCULAR; INTRAVENOUS at 09:10

## 2017-10-23 NOTE — PLAN OF CARE
Problem: Patient Care Overview (Adult)  Goal: Individualization & Mutuality  Outcome: Ongoing (interventions implemented as appropriate)  0800-Labs , hx, and medications reviewed, patient here for 6 month maintenance infusion, presents today with her father. Assessment completed. Discussed plan of care with patient. Patient in agreement. Chair reclined and warm blanket and snack offered.

## 2017-10-23 NOTE — PLAN OF CARE
Problem: Patient Care Overview (Adult)  Goal: Plan of Care Review  1400-Rituxan started at rate of 50 cc/hr and increased by 50 cc/hr every 30 minutes to a max rate of 400 cc/hr. Patient tolerated treatment well. Discharged without complaints or S/S of adverse event. AVS given.  Instructed to call provider for any questions or concerns.

## 2017-10-31 ENCOUNTER — TELEPHONE (OUTPATIENT)
Dept: NEUROLOGY | Facility: CLINIC | Age: 22
End: 2017-10-31

## 2017-11-21 ENCOUNTER — LAB VISIT (OUTPATIENT)
Dept: LAB | Facility: HOSPITAL | Age: 22
End: 2017-11-21
Attending: CLINICAL NURSE SPECIALIST
Payer: COMMERCIAL

## 2017-11-21 DIAGNOSIS — G36.0 NEUROMYELITIS OPTICA: ICD-10-CM

## 2017-11-21 LAB
BASOPHILS # BLD AUTO: 0.09 K/UL
BASOPHILS NFR BLD: 1.1 %
DIFFERENTIAL METHOD: ABNORMAL
EOSINOPHIL # BLD AUTO: 0.1 K/UL
EOSINOPHIL NFR BLD: 0.9 %
ERYTHROCYTE [DISTWIDTH] IN BLOOD BY AUTOMATED COUNT: 14.4 %
HCT VFR BLD AUTO: 42.3 %
HGB BLD-MCNC: 13.8 G/DL
IMM GRANULOCYTES # BLD AUTO: 0.24 K/UL
IMM GRANULOCYTES NFR BLD AUTO: 2.8 %
LYMPHOCYTES # BLD AUTO: 2.1 K/UL
LYMPHOCYTES NFR BLD: 24.7 %
MCH RBC QN AUTO: 27.3 PG
MCHC RBC AUTO-ENTMCNC: 32.6 G/DL
MCV RBC AUTO: 84 FL
MONOCYTES # BLD AUTO: 0.7 K/UL
MONOCYTES NFR BLD: 8.2 %
NEUTROPHILS # BLD AUTO: 5.3 K/UL
NEUTROPHILS NFR BLD: 62.3 %
NRBC BLD-RTO: 0 /100 WBC
PLATELET # BLD AUTO: 338 K/UL
PMV BLD AUTO: 10.4 FL
RBC # BLD AUTO: 5.05 M/UL
WBC # BLD AUTO: 8.46 K/UL

## 2017-11-21 PROCEDURE — 86356 MONONUCLEAR CELL ANTIGEN: CPT

## 2017-11-21 PROCEDURE — 36415 COLL VENOUS BLD VENIPUNCTURE: CPT | Mod: PO

## 2017-11-21 PROCEDURE — 85025 COMPLETE CBC W/AUTO DIFF WBC: CPT

## 2017-11-27 LAB — CD20 CELLS NFR SPEC: NORMAL %

## 2017-12-07 ENCOUNTER — TELEPHONE (OUTPATIENT)
Dept: NEUROLOGY | Facility: CLINIC | Age: 22
End: 2017-12-07

## 2017-12-07 NOTE — TELEPHONE ENCOUNTER
----- Message from Jacob Kelly sent at 12/7/2017  4:55 PM CST -----  Contact: Pt. [phone]  The patient would like to speak to someone regarding rescheduling her appointment. She won't make it due to bad weather. Please contact the patient to discuss further.

## 2018-01-05 ENCOUNTER — TELEPHONE (OUTPATIENT)
Dept: NEUROLOGY | Facility: CLINIC | Age: 23
End: 2018-01-05

## 2018-01-05 ENCOUNTER — PATIENT MESSAGE (OUTPATIENT)
Dept: NEUROLOGY | Facility: CLINIC | Age: 23
End: 2018-01-05

## 2018-01-06 NOTE — PROGRESS NOTES
"Subjective:       Patient ID: Nohemy Ladd is a 22 y.o. female who presents today for a routine clinic visit for NMO. She last saw Ana Maria Todd in September. The history has been provided by the patient. She is accompanied by her mother.     HPI:  · DMT: Rituxan--due in April for next infusion  · Side effects from DMT? NO   · Taking vitamin D3 as recommended? yes   · Nausea and vomiting is much improved. She is taking Prilosec and Zofran.   · The fatigue persists. Her body is "always tired."   · She has not been seeing Dr. Pelayo.   · She restarted her period recently after 15 months. She had very bad cramps and a very heavy flow. She is going to see her gynecologist to see about getting on the birth control pill.     · She has itching all over her body.   · She is taking Cymbalta 60mg, as prescribed after her inpatient stay at [facility].    SOCIAL HISTORY  Social History   Substance Use Topics    Smoking status: Current Every Day Smoker     Packs/day: 0.25     Types: Cigarettes    Smokeless tobacco: Never Used    Alcohol use Yes      Comment: socially     Living arrangements - the patient lives with her aunt.   She is not working right now. She reports that she has been fired from 3 jobs in the last few months for various reasons.     NMO ROS:   Fatigue: as above  Sleep: No issues. She wakes up early, but is sleeping 8 or more hours per night.   Vision: No issues  Bladder: She denies any recent UTIs.   Bowels: She has been constipated lately.   Spasticity: No.   Cognitive: She feels like her memory is improved, but her mother states that short-term memory is not quite where it was  Mood: "Pretty bad." She is really depressed. She is taking Cymbalta, Wellbutrin, and Lamictal. She is not sure if this combination of drugs is helping.   Dysphagia: No issues  Dysarthria: No issues  Pain: She has a squeezing sensation in her middle and lower back, which is causing severe pain.         Objective:      25 " foot timed walk: 3.18 seconds today without assist;  4.2 seconds without assist    Neurologic Exam     Mental Status   Oriented to person, place, and time.   Attention: normal. Concentration: normal.   Speech: speech is normal   Level of consciousness: alert  Knowledge: good.   Normal comprehension.     Cranial Nerves     CN III, IV, VI   Pupils are equal, round, and reactive to light.  Extraocular motions are normal.   Right pupil: Shape: regular. Reactivity: brisk.   Left pupil: Shape: regular. Reactivity: brisk.   CN III: no CN III palsy  CN VI: no CN VI palsy  Nystagmus: none     CN V   Right facial sensation deficit: none  Left facial sensation deficit: none    CN VII   Right facial weakness: none  Left facial weakness: none    CN VIII   Hearing: intact    CN IX, X   Palate: symmetric    CN XI   Right sternocleidomastoid strength: normal  Left sternocleidomastoid strength: normal  Right trapezius strength: normal  Left trapezius strength: normal    CN XII   Tongue deviation: none    Motor Exam   Muscle bulk: normal  Overall muscle tone: normal    Strength   Strength 5/5 throughout.   Right neck flexion: 5/5  Left neck flexion: 5/5  Right neck extension: 5/5  Left neck extension: 5/5  Right deltoid: 5/5  Left deltoid: 5/5  Right biceps: 5/5  Left biceps: 5/5  Right triceps: 5/5  Left triceps: 5/5  Right wrist flexion: 5/5  Left wrist flexion: 5/5  Right wrist extension: 5/5  Left wrist extension: 5/5  Right interossei: 5/5  Left interossei: 5/5  Right iliopsoas: 5/5  Left iliopsoas: 5/5  Right quadriceps: 5/5  Left quadriceps: 5/5  Right hamstrin/5  Left hamstrin/5  Right anterior tibial: 5/5  Left anterior tibial: 5/5  Right gastroc: 5/5  Left gastroc: 5/5    Sensory Exam   Right arm vibration: normal  Left arm vibration: normal  Right leg vibration: normal  Left leg vibration: normal    Gait, Coordination, and Reflexes     Gait  Gait: normal    Coordination   Romberg: negative  Finger to nose  coordination: normal  Heel to shin coordination: normal  Tandem walking coordination: normal    Tremor   Resting tremor: absent    Reflexes   Right brachioradialis: 2+  Left brachioradialis: 2+  Right biceps: 2+  Left biceps: 2+  Right patellar: 2+  Left patellar: 2+  Right achilles: 2+  Left achilles: 2+  Right plantar: equivocal  Left plantar: equivocal  Normal rapid sequential movements in upper and lower extremities. She is able to walk on toes and heels and hop on each foot ten times.         Diagnosis/Assessment/Plan:    1. NMO  · Assessment: Pt is clinically stable on Rituxan.  Her mood remains her most challenging symptom. Her mother is interested in Nohemy establishing care with a neuropsychiatrist to possibly have cognitive rehabilitation, and Nohemy agrees with this.  I have provided her with names and contact information for 3 physicians that are relatively close to her, and I will also try to get a recommendation from our neuropsychology team. She is starting a new weight loss program (ketogenic diet), and I also recommend development of an exercise program to help her build endurance and accompany diet.   · Imaging: MRI brain planned for June/July 2018  · Disease Modifying Therapies: Continue Rituxan. She is due for her next infusion in April. She will have labs in February and March for CBC and CD20. The patient is aware of the risks associated with immune suppressive therapy, including a higher risk of serious infections and malignancy, as well as the importance of avoiding all live virus vaccines while on immune suppressive medication.     2.  Symptom Assessment / Management  · Constipation: I recommend Miralax three times a week or as needed.   · Muscle spasm: Will start Baclofen 5mg at bedtime for muscle spasms in her back. If oral Baclofen is not helpful, we will consider adding Botox injections to paraspinal muscles. I also provided her with a stretching brochure so she can implement stretching  into her exercise routine.   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 The changes that she has gone through in her life are confounding this, as well. I am hopeful that she can be evaluated for cognitive rehab and participate in this if appropriate.   · Neuropathic itching: She has tried oral gabapentin, but this made her too tired. She is on Cymbalta, but this is not helpful for itching. We will try compounded neuropathy cream from [employer] to see if this provides relief.     3. Plantar fascitis: resolved     Over 50% of this 60 minute visit was spent in direct face to face counseling of the patient about her NMO and the management of her various symptoms. The patient and her mother agree with the plan of care. I will see her back in 3 months.         There are no diagnoses linked to this encounter.

## 2018-01-08 ENCOUNTER — OFFICE VISIT (OUTPATIENT)
Dept: NEUROLOGY | Facility: CLINIC | Age: 23
End: 2018-01-08
Payer: COMMERCIAL

## 2018-01-08 VITALS
SYSTOLIC BLOOD PRESSURE: 124 MMHG | WEIGHT: 191 LBS | HEIGHT: 66 IN | BODY MASS INDEX: 30.7 KG/M2 | DIASTOLIC BLOOD PRESSURE: 77 MMHG | HEART RATE: 134 BPM

## 2018-01-08 DIAGNOSIS — L29.9 ITCHING: ICD-10-CM

## 2018-01-08 DIAGNOSIS — R41.89 COGNITIVE IMPAIRMENT: ICD-10-CM

## 2018-01-08 DIAGNOSIS — M79.2 NEUROPATHIC PAIN: ICD-10-CM

## 2018-01-08 DIAGNOSIS — Z79.899 HIGH RISK MEDICATION USE: ICD-10-CM

## 2018-01-08 DIAGNOSIS — M62.838 MUSCLE SPASM: ICD-10-CM

## 2018-01-08 DIAGNOSIS — G36.0 NEUROMYELITIS OPTICA: Primary | ICD-10-CM

## 2018-01-08 DIAGNOSIS — Z29.89 PROPHYLACTIC IMMUNOTHERAPY: ICD-10-CM

## 2018-01-08 DIAGNOSIS — Z71.89 COUNSELING REGARDING GOALS OF CARE: ICD-10-CM

## 2018-01-08 PROCEDURE — 99215 OFFICE O/P EST HI 40 MIN: CPT | Mod: S$GLB,,, | Performed by: CLINICAL NURSE SPECIALIST

## 2018-01-08 PROCEDURE — 99999 PR PBB SHADOW E&M-EST. PATIENT-LVL II: CPT | Mod: PBBFAC,,, | Performed by: CLINICAL NURSE SPECIALIST

## 2018-01-08 RX ORDER — BACLOFEN 10 MG/1
TABLET ORAL
Qty: 30 TABLET | Refills: 1 | Status: SHIPPED | OUTPATIENT
Start: 2018-01-08 | End: 2019-07-05

## 2018-01-08 NOTE — Clinical Note
Hi there, do you guys know of any neuropsychiatrists in the [facility] system for Nohemy? She was seeing Dr. Pelayo, but would prefer to work with a neuropsychiatrist. There are a few relatively close to her (in [place]), but I wasn't aware of any here at [facility]. Any help is much appreciated!  Kasia

## 2018-01-10 ENCOUNTER — DOCUMENTATION ONLY (OUTPATIENT)
Dept: NEUROLOGY | Facility: CLINIC | Age: 23
End: 2018-01-10

## 2018-01-11 ENCOUNTER — PATIENT MESSAGE (OUTPATIENT)
Dept: NEUROLOGY | Facility: CLINIC | Age: 23
End: 2018-01-11

## 2018-02-02 ENCOUNTER — TELEPHONE (OUTPATIENT)
Dept: NEUROLOGY | Facility: CLINIC | Age: 23
End: 2018-02-02

## 2018-02-02 NOTE — TELEPHONE ENCOUNTER
Mother, Donna has been notified of referral message waiting to be read in [facility] Patient Portal.  Donna states that they will review and respond to the message over the weekend.

## 2018-02-06 DIAGNOSIS — G36.0 NEUROMYELITIS OPTICA: Primary | ICD-10-CM

## 2018-02-20 ENCOUNTER — PATIENT MESSAGE (OUTPATIENT)
Dept: NEUROLOGY | Facility: CLINIC | Age: 23
End: 2018-02-20

## 2018-02-21 ENCOUNTER — LAB VISIT (OUTPATIENT)
Dept: LAB | Facility: HOSPITAL | Age: 23
End: 2018-02-21
Attending: CLINICAL NURSE SPECIALIST
Payer: COMMERCIAL

## 2018-02-21 DIAGNOSIS — G36.0 NEUROMYELITIS OPTICA: ICD-10-CM

## 2018-02-21 LAB
BASOPHILS # BLD AUTO: 0.07 K/UL
BASOPHILS NFR BLD: 0.8 %
DIFFERENTIAL METHOD: ABNORMAL
EOSINOPHIL # BLD AUTO: 0.3 K/UL
EOSINOPHIL NFR BLD: 3.1 %
ERYTHROCYTE [DISTWIDTH] IN BLOOD BY AUTOMATED COUNT: 14 %
HCT VFR BLD AUTO: 40.3 %
HGB BLD-MCNC: 12.3 G/DL
IMM GRANULOCYTES # BLD AUTO: 0.18 K/UL
IMM GRANULOCYTES NFR BLD AUTO: 2.1 %
LYMPHOCYTES # BLD AUTO: 2.6 K/UL
LYMPHOCYTES NFR BLD: 30.1 %
MCH RBC QN AUTO: 26.4 PG
MCHC RBC AUTO-ENTMCNC: 30.5 G/DL
MCV RBC AUTO: 87 FL
MONOCYTES # BLD AUTO: 0.6 K/UL
MONOCYTES NFR BLD: 7.4 %
NEUTROPHILS # BLD AUTO: 4.9 K/UL
NEUTROPHILS NFR BLD: 56.5 %
NRBC BLD-RTO: 0 /100 WBC
PLATELET # BLD AUTO: 344 K/UL
PMV BLD AUTO: 10.7 FL
RBC # BLD AUTO: 4.66 M/UL
WBC # BLD AUTO: 8.67 K/UL

## 2018-02-21 PROCEDURE — 85025 COMPLETE CBC W/AUTO DIFF WBC: CPT

## 2018-02-21 PROCEDURE — 86790 VIRUS ANTIBODY NOS: CPT

## 2018-02-21 PROCEDURE — 86704 HEP B CORE ANTIBODY TOTAL: CPT

## 2018-02-21 PROCEDURE — 86803 HEPATITIS C AB TEST: CPT

## 2018-02-21 PROCEDURE — 86706 HEP B SURFACE ANTIBODY: CPT

## 2018-02-21 PROCEDURE — 36415 COLL VENOUS BLD VENIPUNCTURE: CPT | Mod: PO

## 2018-02-21 PROCEDURE — 87340 HEPATITIS B SURFACE AG IA: CPT

## 2018-02-21 PROCEDURE — 86356 MONONUCLEAR CELL ANTIGEN: CPT

## 2018-02-22 LAB
HBV CORE AB SERPL QL IA: NEGATIVE
HBV SURFACE AB SER-ACNC: NEGATIVE M[IU]/ML
HBV SURFACE AG SERPL QL IA: NEGATIVE
HCV AB SERPL QL IA: NEGATIVE
HEPATITIS A ANTIBODY, IGG: NEGATIVE

## 2018-02-27 LAB — CD20 CELLS NFR SPEC: NORMAL %

## 2018-03-09 ENCOUNTER — PATIENT MESSAGE (OUTPATIENT)
Dept: NEUROLOGY | Facility: CLINIC | Age: 23
End: 2018-03-09

## 2018-03-09 DIAGNOSIS — G36.0 NEUROMYELITIS OPTICA: Primary | ICD-10-CM

## 2018-03-14 ENCOUNTER — TELEPHONE (OUTPATIENT)
Dept: NEUROLOGY | Facility: CLINIC | Age: 23
End: 2018-03-14

## 2018-03-27 ENCOUNTER — DOCUMENTATION ONLY (OUTPATIENT)
Dept: NEUROLOGY | Facility: CLINIC | Age: 23
End: 2018-03-27

## 2018-03-29 ENCOUNTER — PATIENT MESSAGE (OUTPATIENT)
Dept: NEUROLOGY | Facility: CLINIC | Age: 23
End: 2018-03-29

## 2018-04-05 NOTE — TELEPHONE ENCOUNTER
I spoke with pt's mother and requested recent CBC from [facility] be faxed to our office. Provided fax number.     Her mom said tests so far done at [facility] have shown elevated testosterone levels, hypothyroidism, pre-diabetic, low B-12, low Vitamin D, elevated WBC and low iron levels. She also mentioned possible candida overgrowth. Pt was started on the following medications per her mom:    D Pinitol 600 mg BID  Vitamin D3 5,000 daily  Feosol daily  B-12 5,000 mcg daily

## 2018-04-09 NOTE — TELEPHONE ENCOUNTER
Received labs from [facility], collecte don 3/23/18 and 3/14/18. Placed in Kasia's folder for review.

## 2018-04-10 NOTE — TELEPHONE ENCOUNTER
Received labs from 3/23/18:    WBC=8.5  XEX=[id number]    CMP from 3/14/18 has normal ALT/AST. Alk Phos elevated=211.   F67=175 (low end of normal)  Vitamin D=22.1. She needs to supplement with 10,000 units daily.

## 2018-04-12 NOTE — TELEPHONE ENCOUNTER
Pt scheduled for next Rituxan on 4/20/18 at [facility]. Her last infusion was on 10/23/17. Auth approved:    Secondary Insurance: BLUE CROSS BLUE SHIELD/nxtControl ALL OUT OF STATE/MS  Plan date [id number]   Authorization #: [id number]  [id number]

## 2018-04-16 RX ORDER — ACETAMINOPHEN 325 MG/1
650 TABLET ORAL
Status: CANCELLED | OUTPATIENT
Start: 2018-04-16

## 2018-04-16 RX ORDER — HEPARIN 100 UNIT/ML
500 SYRINGE INTRAVENOUS
Status: CANCELLED | OUTPATIENT
Start: 2018-04-16

## 2018-04-16 RX ORDER — FAMOTIDINE 10 MG/ML
20 INJECTION INTRAVENOUS
Status: CANCELLED | OUTPATIENT
Start: 2018-04-16

## 2018-04-16 RX ORDER — SODIUM CHLORIDE 0.9 % (FLUSH) 0.9 %
10 SYRINGE (ML) INJECTION
Status: CANCELLED | OUTPATIENT
Start: 2018-04-16

## 2018-04-20 ENCOUNTER — INFUSION (OUTPATIENT)
Dept: INFUSION THERAPY | Facility: HOSPITAL | Age: 23
End: 2018-04-20
Attending: PSYCHIATRY & NEUROLOGY
Payer: COMMERCIAL

## 2018-04-20 VITALS
SYSTOLIC BLOOD PRESSURE: 100 MMHG | HEART RATE: 111 BPM | DIASTOLIC BLOOD PRESSURE: 66 MMHG | RESPIRATION RATE: 18 BRPM | TEMPERATURE: 98 F

## 2018-04-20 DIAGNOSIS — G36.0 NEUROMYELITIS OPTICA: Primary | ICD-10-CM

## 2018-04-20 PROCEDURE — 96413 CHEMO IV INFUSION 1 HR: CPT

## 2018-04-20 PROCEDURE — 63600175 PHARM REV CODE 636 W HCPCS: Performed by: PSYCHIATRY & NEUROLOGY

## 2018-04-20 PROCEDURE — 96375 TX/PRO/DX INJ NEW DRUG ADDON: CPT

## 2018-04-20 PROCEDURE — 96415 CHEMO IV INFUSION ADDL HR: CPT

## 2018-04-20 PROCEDURE — 96367 TX/PROPH/DG ADDL SEQ IV INF: CPT

## 2018-04-20 PROCEDURE — 25000003 PHARM REV CODE 250: Performed by: PSYCHIATRY & NEUROLOGY

## 2018-04-20 PROCEDURE — S0028 INJECTION, FAMOTIDINE, 20 MG: HCPCS | Performed by: PSYCHIATRY & NEUROLOGY

## 2018-04-20 RX ORDER — ACETAMINOPHEN 325 MG/1
650 TABLET ORAL
Status: CANCELLED | OUTPATIENT
Start: 2018-04-20

## 2018-04-20 RX ORDER — SODIUM CHLORIDE 0.9 % (FLUSH) 0.9 %
10 SYRINGE (ML) INJECTION
Status: DISCONTINUED | OUTPATIENT
Start: 2018-04-20 | End: 2018-04-20 | Stop reason: HOSPADM

## 2018-04-20 RX ORDER — ACETAMINOPHEN 325 MG/1
650 TABLET ORAL
Status: COMPLETED | OUTPATIENT
Start: 2018-04-20 | End: 2018-04-20

## 2018-04-20 RX ORDER — HEPARIN 100 UNIT/ML
500 SYRINGE INTRAVENOUS
Status: CANCELLED | OUTPATIENT
Start: 2018-04-20

## 2018-04-20 RX ORDER — FAMOTIDINE 10 MG/ML
20 INJECTION INTRAVENOUS
Status: COMPLETED | OUTPATIENT
Start: 2018-04-20 | End: 2018-04-20

## 2018-04-20 RX ORDER — SODIUM CHLORIDE 0.9 % (FLUSH) 0.9 %
10 SYRINGE (ML) INJECTION
Status: CANCELLED | OUTPATIENT
Start: 2018-04-20

## 2018-04-20 RX ORDER — HEPARIN 100 UNIT/ML
500 SYRINGE INTRAVENOUS
Status: DISCONTINUED | OUTPATIENT
Start: 2018-04-20 | End: 2018-04-20 | Stop reason: HOSPADM

## 2018-04-20 RX ORDER — FAMOTIDINE 10 MG/ML
20 INJECTION INTRAVENOUS
Status: CANCELLED | OUTPATIENT
Start: 2018-04-20

## 2018-04-20 RX ADMIN — DEXTROSE: 50 INJECTION, SOLUTION INTRAVENOUS at 09:04

## 2018-04-20 RX ADMIN — FAMOTIDINE 20 MG: 10 INJECTION INTRAVENOUS at 09:04

## 2018-04-20 RX ADMIN — SODIUM CHLORIDE: 9 INJECTION, SOLUTION INTRAVENOUS at 08:04

## 2018-04-20 RX ADMIN — ACETAMINOPHEN 650 MG: 325 TABLET, FILM COATED ORAL at 08:04

## 2018-04-20 RX ADMIN — DIPHENHYDRAMINE HYDROCHLORIDE 50 MG: 50 INJECTION, SOLUTION INTRAMUSCULAR; INTRAVENOUS at 08:04

## 2018-04-20 RX ADMIN — RITUXIMAB 1000 MG: 10 INJECTION, SOLUTION INTRAVENOUS at 09:04

## 2018-04-20 NOTE — PLAN OF CARE
Problem: Patient Care Overview (Adult)  Goal: Plan of Care Review  Outcome: Ongoing (interventions implemented as appropriate)  Infusion completed, pt tolerated well; pt instructed to remain well hydrated, to contact MD for any needs or concerns, to scheduled next six-month infusion immediately prior or not later than 10/20/18; printed AVS given to and reviewed with pt, pt verbalized understanding of all discussed and when to report next

## 2018-04-20 NOTE — PLAN OF CARE
Problem: Chemotherapy Effects (Adult)  Goal: Signs and Symptoms of Listed Potential Problems Will be Absent, Minimized or Managed (Chemotherapy Effects)  Signs and symptoms of listed potential problems will be absent, minimized or managed by discharge/transition of care (reference Chemotherapy Effects (Adult) CPG).  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

## 2018-05-09 ENCOUNTER — TELEPHONE (OUTPATIENT)
Dept: NEUROLOGY | Facility: CLINIC | Age: 23
End: 2018-05-09

## 2018-06-11 ENCOUNTER — TELEPHONE (OUTPATIENT)
Dept: NEUROLOGY | Facility: CLINIC | Age: 23
End: 2018-06-11

## 2018-06-11 NOTE — TELEPHONE ENCOUNTER
----- Message from Renaldo Zhang sent at 6/11/2018 10:37 AM CDT -----  Needs Advice    Reason for call:  Pt is calling to schedule lab work/reschedule missed appt on 05/09/18.    Communication Preference: [phone]  Additional Information:

## 2018-06-19 ENCOUNTER — TELEPHONE (OUTPATIENT)
Dept: NEUROLOGY | Facility: CLINIC | Age: 23
End: 2018-06-19

## 2018-06-19 ENCOUNTER — LAB VISIT (OUTPATIENT)
Dept: LAB | Facility: HOSPITAL | Age: 23
End: 2018-06-19
Payer: COMMERCIAL

## 2018-06-19 ENCOUNTER — OFFICE VISIT (OUTPATIENT)
Dept: NEUROLOGY | Facility: CLINIC | Age: 23
End: 2018-06-19
Payer: COMMERCIAL

## 2018-06-19 VITALS
SYSTOLIC BLOOD PRESSURE: 118 MMHG | HEART RATE: 130 BPM | WEIGHT: 173.5 LBS | BODY MASS INDEX: 27.88 KG/M2 | HEIGHT: 66 IN | DIASTOLIC BLOOD PRESSURE: 76 MMHG

## 2018-06-19 DIAGNOSIS — Z71.89 COUNSELING REGARDING GOALS OF CARE: ICD-10-CM

## 2018-06-19 DIAGNOSIS — Z79.899 HIGH RISK MEDICATION USE: ICD-10-CM

## 2018-06-19 DIAGNOSIS — G36.0 NEUROMYELITIS OPTICA: Primary | ICD-10-CM

## 2018-06-19 DIAGNOSIS — G36.0 NEUROMYELITIS OPTICA: ICD-10-CM

## 2018-06-19 DIAGNOSIS — Z29.89 PROPHYLACTIC IMMUNOTHERAPY: ICD-10-CM

## 2018-06-19 DIAGNOSIS — D84.9 IMMUNOSUPPRESSION: ICD-10-CM

## 2018-06-19 DIAGNOSIS — F31.81 BIPOLAR 2 DISORDER: ICD-10-CM

## 2018-06-19 LAB
BASOPHILS # BLD AUTO: 0.1 K/UL
BASOPHILS NFR BLD: 0.9 %
DIFFERENTIAL METHOD: ABNORMAL
EOSINOPHIL # BLD AUTO: 0.2 K/UL
EOSINOPHIL NFR BLD: 1.4 %
ERYTHROCYTE [DISTWIDTH] IN BLOOD BY AUTOMATED COUNT: 14.3 %
HCT VFR BLD AUTO: 41 %
HGB BLD-MCNC: 13.2 G/DL
IMM GRANULOCYTES # BLD AUTO: 0.24 K/UL
IMM GRANULOCYTES NFR BLD AUTO: 2.1 %
LYMPHOCYTES # BLD AUTO: 3.2 K/UL
LYMPHOCYTES NFR BLD: 27.7 %
MCH RBC QN AUTO: 26.5 PG
MCHC RBC AUTO-ENTMCNC: 32.2 G/DL
MCV RBC AUTO: 82 FL
MONOCYTES # BLD AUTO: 0.7 K/UL
MONOCYTES NFR BLD: 6.5 %
NEUTROPHILS # BLD AUTO: 7 K/UL
NEUTROPHILS NFR BLD: 61.4 %
NRBC BLD-RTO: 0 /100 WBC
PLATELET # BLD AUTO: 335 K/UL
PMV BLD AUTO: 10.4 FL
RBC # BLD AUTO: 4.99 M/UL
WBC # BLD AUTO: 11.45 K/UL

## 2018-06-19 PROCEDURE — 99999 PR PBB SHADOW E&M-EST. PATIENT-LVL III: CPT | Mod: PBBFAC,,, | Performed by: CLINICAL NURSE SPECIALIST

## 2018-06-19 PROCEDURE — 3008F BODY MASS INDEX DOCD: CPT | Mod: CPTII,S$GLB,, | Performed by: CLINICAL NURSE SPECIALIST

## 2018-06-19 PROCEDURE — 99215 OFFICE O/P EST HI 40 MIN: CPT | Mod: S$GLB,,, | Performed by: CLINICAL NURSE SPECIALIST

## 2018-06-19 PROCEDURE — 36415 COLL VENOUS BLD VENIPUNCTURE: CPT

## 2018-06-19 PROCEDURE — 85025 COMPLETE CBC W/AUTO DIFF WBC: CPT | Mod: 91

## 2018-06-19 RX ORDER — SERTRALINE HYDROCHLORIDE 100 MG/1
150 TABLET, FILM COATED ORAL DAILY
COMMUNITY
End: 2019-12-02 | Stop reason: ALTCHOICE

## 2018-06-19 RX ORDER — BACLOFEN 10 MG/1
TABLET ORAL
Qty: 60 TABLET | Refills: 5 | Status: SHIPPED | OUTPATIENT
Start: 2018-06-19 | End: 2019-07-05

## 2018-06-19 RX ORDER — NORETHINDRONE ACETATE AND ETHINYL ESTRADIOL .02; 1 MG/1; MG/1
1 TABLET ORAL DAILY
COMMUNITY
End: 2019-07-05

## 2018-06-19 NOTE — PROGRESS NOTES
"Subjective:       Patient ID: Nohemy Ladd is a 23 y.o. female who presents today for a routine clinic visit for NMO. The history has been provided by the patient. She is accompanied by her mother. She was last seen in January 2018.     NMO HPI:  · DMT: Rituxan, last infusion on 4/20/18; due next in October 2018.   · Side effects from DMT? No  · Taking vitamin D3 as recommended? Yes -  Dose: 7000 units daily   She was seen in the [facility] in [place] earlier this year. Patient's mother reports that lab results from there show "elevated testosterone levels (because of steroids?), hypothyroidism, pre-diabetes, low B-12, low Vitamin D, elevated WBC and low iron levels. 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 Pt was started on the following medications per her mom:     D Pinitol 600 mg BID  Vitamin D3 5,000 daily  Feosol daily  B-12 5,000 mcg daily  She plans to get on a treatment for the Candida overgriowth. It is a compounded drug that will come from [place]. Patient reports that she had a SPECT scan of her brain that showed she has severe depression and anxiety.       [facility]  Her mother reports that she had been stealing and had been acting out, so they essentially had her placed under arrest.  She was also suicidal. Once "arrested," she was evaluated by a mental health professional, who recommended that she go for inpatient treatment. She was in [facility] in [place] for a month. She was diagnosed with bipolar II disorder.   Since her release from [facility], she does seem to be better. She still gets angry "at the drop of a hat," but is able to control the anger.  She is on Nuedexta, and this seems to be helpful. She feels like her mood has been better. She is still depressed, according to her mom. She feels that her anxiety has gotten worse. She has a follow-up with a psychiatrist at [facility] on 6/21 ([phone]).   She is taking Zoloft, Lamictal, and Hydroxyzine.     She gets hiccups a few times a day. She denies any reflux.     SOCIAL HISTORY  Social History   Substance Use Topics    Smoking status: Current Every Day Smoker     Packs/day: 0.25     Types: Cigarettes    Smokeless tobacco: Never Used    Alcohol use Yes      Comment: socially     Living arrangements - the patient lives with her aunt Vashti Burciaga--She has been applying for some jobs online.     NMO ROS:  · Fatigue: Yes - Energy level is pretty low.   · Sleep Disturbance: Yes - She naps during the day, but sleeps well at night.   · Bladder Dysfunction: Yes - She feels like she may have a urinary tract infection right now.   · Bowel Dysfunction: Yes - With Nuedexta, she has diarrhea. She states "this is better than being constipated."   · Spasticity: No  · Visual Symptoms: No  · Cognitive: Yes. She loses track of conversations frequently or forgets details.   · Mood Disorder: Yes - As above.   · Gait Disturbance: No  · Falls: No  · Hand Dysfunction: No  · Pain: Yes - She has lower back pain and in between her shoulder blades.   · Sexual Dysfunction: Not Assessed  · Skin Breakdown: No  · Tremors: No  · Dysphagia:  No  · Dysarthria:  No  · Heat sensitivity:  No.   · Any un-met adaptive needs? No  · Copay Assist?  Yes - Rituxan covered by insurance.      Objective:        1. 25 foot timed walk: 3.12 seconds today without assist; was 3.18 seconds at lat visit without assist     Neurologic Exam     Mental Status   Oriented to person, place, and time.   Attention: normal. Concentration: normal.   Speech: speech is normal   Level of consciousness: alert  Knowledge: good.   Normal comprehension.     Cranial Nerves     CN III, IV, VI   Pupils are equal, round, and reactive to light.  Extraocular motions are normal.   Right pupil: Shape: regular. Reactivity: brisk.   Left pupil: Shape: regular. Reactivity: brisk.   CN III: no CN III " palsy  CN VI: no CN VI palsy  Nystagmus: none     CN V   Right facial sensation deficit: none  Left facial sensation deficit: none    CN VII   Right facial weakness: none  Left facial weakness: none    CN VIII   Hearing: intact    CN IX, X   Palate: symmetric    CN XI   Right sternocleidomastoid strength: normal  Left sternocleidomastoid strength: normal  Right trapezius strength: normal  Left trapezius strength: normal    CN XII   Tongue deviation: none    Motor Exam   Muscle bulk: normal  Overall muscle tone: normal    Strength   Strength 5/5 throughout.   Right neck flexion: 5/5  Left neck flexion: 5/5  Right neck extension: 5/5  Left neck extension: 5/5  Right deltoid: 5/5  Left deltoid: 5/5  Right biceps: 5/5  Left biceps: 5/5  Right triceps: 5/5  Left triceps: 5/5  Right wrist flexion: 5/5  Left wrist flexion: 5/5  Right wrist extension: 5/5  Left wrist extension: 5/5  Right interossei: 5/5  Left interossei: 5/5  Right iliopsoas: 5/5  Left iliopsoas: 5/5  Right quadriceps: 5/5  Left quadriceps: 5/5  Right hamstrin/5  Left hamstrin/5  Right anterior tibial: 5/5  Left anterior tibial: 5/5  Right gastroc: 5/5  Left gastroc: 5/5    Sensory Exam   Right arm vibration: normal  Left arm vibration: normal  Right leg vibration: normal  Left leg vibration: normal    Gait, Coordination, and Reflexes     Gait  Gait: normal    Coordination   Romberg: negative  Finger to nose coordination: normal  Heel to shin coordination: normal  Tandem walking coordination: normal    Tremor   Resting tremor: absent    Reflexes   Right brachioradialis: 2+  Left brachioradialis: 2+  Right biceps: 2+  Left biceps: 2+  Right triceps: 2+  Left triceps: 2+  Right patellar: 2+  Left patellar: 2+  Right achilles: 2+  Left achilles: 2+  Right plantar: equivocal  Left plantar: equivocal  Normal rapid sequential movements in upper and lower extremities. She is able to walk on toes and heels and hop on each foot ten times.          Imaging:    No new imaging to review.     Labs:     Lab Results   Component Value Date    WBC 11.50 06/19/2018    HGB 12.9 06/19/2018    HCT 40.5 06/19/2018    MCV 83 06/19/2018     06/19/2018         Diagnosis/Assessment/Plan:    1. Neuromyelitis Optica  · Assessment: Nohemy is clinically stable. She now has bipolar II diagnosis, so will hopefully be on track for mental health treatment locally.   · Imaging: MRI brain planned June/July. Ordered today.   · Disease Modifying Therapies: Continue Rituxan. Next infusion will be in October. Will check CBC/CD20 in September. Lab orders given to patient for September labs. Will also check Vitamin D today. I have referred her to infectious disease department for vaccine management. She just had hepatitis labs in February. We will also check RPR, VZV, HIV, TB, and Strongyloides.     2. NMO Symptom Assessment / Management  · Bladder Dysfunction:  Will screen for UTI with UA/cx today.   · Spasticity: Rx for Baclofen 15-20mg at bedtime sent to pharmacy.   · Mood Disorder: Keep follow up with psychiatrist planned for end of week.    Over 50% of this 60 minute visit was spent in direct face to face counseling of the patient about NMO, DMT considerations, and symptom management. The patient agrees with the plan of care.She will follow up with Dr. Guadarrama in October/November.     Kasia Doll, [facility], MSCN        There are no diagnoses linked to this encounter.

## 2018-06-19 NOTE — TELEPHONE ENCOUNTER
----- Message from Evelin Mcconnell sent at 6/19/2018 12:29 PM CDT -----  Contact: Pt's mom   Per mom they are running late due to traffic.    Pt's mom can be reached at [phone].    Thank you

## 2018-06-20 ENCOUNTER — TELEPHONE (OUTPATIENT)
Dept: NEUROLOGY | Facility: CLINIC | Age: 23
End: 2018-06-20

## 2018-06-20 DIAGNOSIS — R39.9 UTI SYMPTOMS: Primary | ICD-10-CM

## 2018-06-21 NOTE — TELEPHONE ENCOUNTER
----- Message from Ximena M Mckinney sent at 6/20/2018  3:27 PM CDT -----  Regarding: Lab Client Services  Contact: [phone]  Hi my name is Ximena I work in the Lab Client Services. We had a problem with some lab work on this patient. If someone from your office could call us at [phone] or ext [phone] that would be great. Anyone in my department can help. Thank you

## 2018-06-21 NOTE — TELEPHONE ENCOUNTER
Pt said her symptoms come and go. She will have UA/Urine Cx done at [facility] at [phone]. Orders placed in Kasia's folder for signature.

## 2018-06-25 ENCOUNTER — DOCUMENTATION ONLY (OUTPATIENT)
Dept: NEUROLOGY | Facility: CLINIC | Age: 23
End: 2018-06-25

## 2018-07-18 ENCOUNTER — HOSPITAL ENCOUNTER (OUTPATIENT)
Dept: RADIOLOGY | Facility: HOSPITAL | Age: 23
Discharge: HOME | End: 2018-07-18
Attending: CLINICAL NURSE SPECIALIST
Payer: COMMERCIAL

## 2018-07-18 DIAGNOSIS — D84.9 IMMUNOSUPPRESSION: ICD-10-CM

## 2018-07-18 DIAGNOSIS — G36.0 NEUROMYELITIS OPTICA: ICD-10-CM

## 2018-07-18 PROCEDURE — 70553 MRI BRAIN STEM W/O & W/DYE: CPT | Mod: 26,,, | Performed by: RADIOLOGY

## 2018-07-18 PROCEDURE — 25500020 PHARM REV CODE 255: Performed by: CLINICAL NURSE SPECIALIST

## 2018-07-18 PROCEDURE — A9585 GADOBUTROL INJECTION: HCPCS | Performed by: CLINICAL NURSE SPECIALIST

## 2018-07-18 PROCEDURE — 70553 MRI BRAIN STEM W/O & W/DYE: CPT | Mod: TC

## 2018-07-18 RX ORDER — GADOBUTROL 604.72 MG/ML
9 INJECTION INTRAVENOUS
Status: COMPLETED | OUTPATIENT
Start: 2018-07-18 | End: 2018-07-18

## 2018-07-18 RX ADMIN — GADOBUTROL 9 ML: 604.72 INJECTION INTRAVENOUS at 11:07

## 2018-08-09 ENCOUNTER — TELEPHONE (OUTPATIENT)
Dept: NEUROLOGY | Facility: CLINIC | Age: 23
End: 2018-08-09

## 2018-08-09 DIAGNOSIS — G36.0 NEUROMYELITIS OPTICA: Primary | ICD-10-CM

## 2018-08-14 ENCOUNTER — LAB VISIT (OUTPATIENT)
Dept: LAB | Facility: HOSPITAL | Age: 23
End: 2018-08-14
Attending: CLINICAL NURSE SPECIALIST
Payer: COMMERCIAL

## 2018-08-14 DIAGNOSIS — G36.0 NEUROMYELITIS OPTICA: ICD-10-CM

## 2018-08-14 LAB
BASOPHILS # BLD AUTO: 0.07 K/UL
BASOPHILS NFR BLD: 0.7 %
DIFFERENTIAL METHOD: ABNORMAL
EOSINOPHIL # BLD AUTO: 0.1 K/UL
EOSINOPHIL NFR BLD: 1 %
ERYTHROCYTE [DISTWIDTH] IN BLOOD BY AUTOMATED COUNT: 13.8 %
HCT VFR BLD AUTO: 42 %
HGB BLD-MCNC: 13.1 G/DL
IMM GRANULOCYTES # BLD AUTO: 0.25 K/UL
IMM GRANULOCYTES NFR BLD AUTO: 2.5 %
LYMPHOCYTES # BLD AUTO: 3.6 K/UL
LYMPHOCYTES NFR BLD: 36.7 %
MCH RBC QN AUTO: 26.5 PG
MCHC RBC AUTO-ENTMCNC: 31.2 G/DL
MCV RBC AUTO: 85 FL
MONOCYTES # BLD AUTO: 0.5 K/UL
MONOCYTES NFR BLD: 5.3 %
NEUTROPHILS # BLD AUTO: 5.3 K/UL
NEUTROPHILS NFR BLD: 53.8 %
NRBC BLD-RTO: 0 /100 WBC
PLATELET # BLD AUTO: 326 K/UL
PMV BLD AUTO: 10.6 FL
RBC # BLD AUTO: 4.95 M/UL
WBC # BLD AUTO: 9.91 K/UL

## 2018-08-14 PROCEDURE — 87340 HEPATITIS B SURFACE AG IA: CPT

## 2018-08-14 PROCEDURE — 86704 HEP B CORE ANTIBODY TOTAL: CPT

## 2018-08-14 PROCEDURE — 36415 COLL VENOUS BLD VENIPUNCTURE: CPT | Mod: PO

## 2018-08-14 PROCEDURE — 85025 COMPLETE CBC W/AUTO DIFF WBC: CPT

## 2018-08-14 PROCEDURE — 86706 HEP B SURFACE ANTIBODY: CPT

## 2018-08-14 PROCEDURE — 86356 MONONUCLEAR CELL ANTIGEN: CPT

## 2018-08-15 LAB
HBV CORE AB SERPL QL IA: NEGATIVE
HBV SURFACE AB SER-ACNC: NEGATIVE M[IU]/ML
HBV SURFACE AG SERPL QL IA: NEGATIVE

## 2018-08-20 LAB — CD20 CELLS NFR SPEC: NORMAL %

## 2018-09-17 ENCOUNTER — LAB VISIT (OUTPATIENT)
Dept: LAB | Facility: HOSPITAL | Age: 23
End: 2018-09-17
Attending: CLINICAL NURSE SPECIALIST
Payer: COMMERCIAL

## 2018-09-17 DIAGNOSIS — G36.0 NEUROMYELITIS OPTICA: ICD-10-CM

## 2018-09-17 LAB
BASOPHILS # BLD AUTO: 0.11 K/UL
BASOPHILS NFR BLD: 0.9 %
DIFFERENTIAL METHOD: ABNORMAL
EOSINOPHIL # BLD AUTO: 0.1 K/UL
EOSINOPHIL NFR BLD: 0.7 %
ERYTHROCYTE [DISTWIDTH] IN BLOOD BY AUTOMATED COUNT: 14.3 %
HCT VFR BLD AUTO: 44.5 %
HGB BLD-MCNC: 13.8 G/DL
IMM GRANULOCYTES # BLD AUTO: 0.27 K/UL
IMM GRANULOCYTES NFR BLD AUTO: 2.3 %
LYMPHOCYTES # BLD AUTO: 3 K/UL
LYMPHOCYTES NFR BLD: 25.4 %
MCH RBC QN AUTO: 26.5 PG
MCHC RBC AUTO-ENTMCNC: 31 G/DL
MCV RBC AUTO: 85 FL
MONOCYTES # BLD AUTO: 0.6 K/UL
MONOCYTES NFR BLD: 5.2 %
NEUTROPHILS # BLD AUTO: 7.8 K/UL
NEUTROPHILS NFR BLD: 65.5 %
NRBC BLD-RTO: 0 /100 WBC
PLATELET # BLD AUTO: 379 K/UL
PMV BLD AUTO: 10.5 FL
RBC # BLD AUTO: 5.21 M/UL
WBC # BLD AUTO: 11.84 K/UL

## 2018-09-17 PROCEDURE — 36415 COLL VENOUS BLD VENIPUNCTURE: CPT | Mod: PO

## 2018-09-17 PROCEDURE — 85025 COMPLETE CBC W/AUTO DIFF WBC: CPT

## 2018-09-17 PROCEDURE — 86356 MONONUCLEAR CELL ANTIGEN: CPT

## 2018-09-24 LAB — CD20 CELLS NFR SPEC: NORMAL %

## 2018-09-26 ENCOUNTER — TELEPHONE (OUTPATIENT)
Dept: NEUROLOGY | Facility: CLINIC | Age: 23
End: 2018-09-26

## 2018-09-26 NOTE — TELEPHONE ENCOUNTER
Pt is scheduled for her maintenance Rituxan on 10/15/18. Her last infusion was on 4/20/18. Labs completed on 9/17/18- WBC 11.84, ALC 3007., CD19, 20=0. Auth approved.

## 2018-10-15 ENCOUNTER — TELEPHONE (OUTPATIENT)
Dept: NEUROLOGY | Facility: CLINIC | Age: 23
End: 2018-10-15

## 2018-10-15 NOTE — TELEPHONE ENCOUNTER
----- Message from Renaldo Zhang sent at 10/15/2018  9:17 AM CDT -----  Needs Advice    Reason for call: Tanner is asking to speak w/ Kasia regarding the pt's infusion treatment she's scheduled for        Communication Preference: Tanner (father) @ [phone]    Additional Information:

## 2018-10-16 ENCOUNTER — TELEPHONE (OUTPATIENT)
Dept: NEUROLOGY | Facility: CLINIC | Age: 23
End: 2018-10-16

## 2018-10-16 NOTE — TELEPHONE ENCOUNTER
VM left for Tanner to call this office back.     Of note: call staff reports that Tanner has PoA for pt and that pt no longer has a working phone; pt canceled infusion appt scheduled for 10/15/18.

## 2018-10-16 NOTE — TELEPHONE ENCOUNTER
----- Message from Brisa Abel sent at 10/16/2018  9:02 AM CDT -----  Contact: Tanner (father) @ [phone]  Returning a call to Ashley on pts behalf.  Pls call.

## 2018-10-16 NOTE — TELEPHONE ENCOUNTER
"Call received from pt's father, Tanner. He is very upset that I have been talking with the pt about her upcoming Rituxan appt. Advised Tanner that no PoA or "involvment in care" is on file for us to be able to speak with him regarding pt's care. He states this form has been given to [facility] at pt's last appt. It is not in the system currently. (During call Tanner was [facility]    He states that pt cannot leave the state at this time--she violated her probation and has been in/out of assisted for shoplifting and disorderly conduct. He states that [facility] has cancer center and now is an [facility] entity. Advised him that will check into this, but it may delay pt getting her infusion.     He insists that no calls be made to pt for the foreseeable future regarding her care, as she is unable to make sound decisions.    He will call this office back when an appt is able to be rescheduled for Rituxan.  "

## 2018-10-16 NOTE — TELEPHONE ENCOUNTER
Call returned to pt. Her phone has been temporarily disconnected, and she is using her aunt's phone. She needs to reschedule her infusion appt--her car recently got repossessed. Pt given number to get rescheduled. She is now scheduled for 10/18 at 9am.    Pt also expresses interest in receiving infusions closer to home. [facility], [facility], [facility], and [facility]. Pt states none of those options are very much closer to her. Advised pt that we have no other options at hand.    Pt also asked that I not call her father about this.

## 2018-10-16 NOTE — TELEPHONE ENCOUNTER
----- Message from William Younger sent at 10/16/2018 12:06 PM CDT -----  Contact: Patient @ [phone]  Patient is requesting a return call about her infusion treatment , pls call

## 2018-10-22 ENCOUNTER — TELEPHONE (OUTPATIENT)
Dept: NEUROLOGY | Facility: CLINIC | Age: 23
End: 2018-10-22

## 2018-10-22 NOTE — TELEPHONE ENCOUNTER
Returned call to pt's dad. Pt's infusion has been rescheduled to 10/29/18 at 8AM at the [facility].

## 2018-10-22 NOTE — TELEPHONE ENCOUNTER
----- Message from Jacob Kelly sent at 10/22/2018  1:23 PM CDT -----  Contact: Pt . [phone]  Needs Advice    Reason for call:The patient would like to speak to someone regarding scheduling her chemo. Please contact the patient to discuss further.          Communication Preference:PHONE     Additional Information:

## 2018-10-22 NOTE — TELEPHONE ENCOUNTER
Pt's Rituxan has been rescheduled to 10/29/18. Pt requested I call her dad with appt date and time. Pt's dad notified of appt date and time.

## 2018-10-29 ENCOUNTER — INFUSION (OUTPATIENT)
Dept: INFUSION THERAPY | Facility: HOSPITAL | Age: 23
End: 2018-10-29
Attending: PSYCHIATRY & NEUROLOGY
Payer: COMMERCIAL

## 2018-10-29 ENCOUNTER — TELEPHONE (OUTPATIENT)
Dept: NEUROLOGY | Facility: CLINIC | Age: 23
End: 2018-10-29

## 2018-10-29 VITALS
SYSTOLIC BLOOD PRESSURE: 105 MMHG | HEART RATE: 103 BPM | WEIGHT: 179.88 LBS | HEIGHT: 67 IN | BODY MASS INDEX: 28.23 KG/M2 | RESPIRATION RATE: 18 BRPM | DIASTOLIC BLOOD PRESSURE: 56 MMHG

## 2018-10-29 DIAGNOSIS — G36.0 NEUROMYELITIS OPTICA: Primary | ICD-10-CM

## 2018-10-29 PROCEDURE — 96367 TX/PROPH/DG ADDL SEQ IV INF: CPT

## 2018-10-29 PROCEDURE — 96415 CHEMO IV INFUSION ADDL HR: CPT

## 2018-10-29 PROCEDURE — 25000003 PHARM REV CODE 250: Performed by: PSYCHIATRY & NEUROLOGY

## 2018-10-29 PROCEDURE — 96413 CHEMO IV INFUSION 1 HR: CPT

## 2018-10-29 PROCEDURE — 63600175 PHARM REV CODE 636 W HCPCS: Performed by: PSYCHIATRY & NEUROLOGY

## 2018-10-29 PROCEDURE — 96375 TX/PRO/DX INJ NEW DRUG ADDON: CPT

## 2018-10-29 PROCEDURE — S0028 INJECTION, FAMOTIDINE, 20 MG: HCPCS | Performed by: PSYCHIATRY & NEUROLOGY

## 2018-10-29 RX ORDER — ACETAMINOPHEN 325 MG/1
650 TABLET ORAL
Status: CANCELLED | OUTPATIENT
Start: 2018-10-29

## 2018-10-29 RX ORDER — HEPARIN 100 UNIT/ML
500 SYRINGE INTRAVENOUS
Status: CANCELLED | OUTPATIENT
Start: 2018-10-29

## 2018-10-29 RX ORDER — DIPHENHYDRAMINE HCL 50 MG
50 CAPSULE ORAL
Status: COMPLETED | OUTPATIENT
Start: 2018-10-29 | End: 2018-10-29

## 2018-10-29 RX ORDER — HEPARIN 100 UNIT/ML
500 SYRINGE INTRAVENOUS
Status: DISCONTINUED | OUTPATIENT
Start: 2018-10-29 | End: 2018-10-29 | Stop reason: HOSPADM

## 2018-10-29 RX ORDER — FAMOTIDINE 10 MG/ML
20 INJECTION INTRAVENOUS
Status: COMPLETED | OUTPATIENT
Start: 2018-10-29 | End: 2018-10-29

## 2018-10-29 RX ORDER — FAMOTIDINE 10 MG/ML
20 INJECTION INTRAVENOUS
Status: CANCELLED | OUTPATIENT
Start: 2018-10-29

## 2018-10-29 RX ORDER — SODIUM CHLORIDE 0.9 % (FLUSH) 0.9 %
10 SYRINGE (ML) INJECTION
Status: CANCELLED | OUTPATIENT
Start: 2018-10-29

## 2018-10-29 RX ORDER — SODIUM CHLORIDE 0.9 % (FLUSH) 0.9 %
10 SYRINGE (ML) INJECTION
Status: DISCONTINUED | OUTPATIENT
Start: 2018-10-29 | End: 2018-10-29 | Stop reason: HOSPADM

## 2018-10-29 RX ORDER — ACETAMINOPHEN 325 MG/1
650 TABLET ORAL
Status: COMPLETED | OUTPATIENT
Start: 2018-10-29 | End: 2018-10-29

## 2018-10-29 RX ADMIN — DIPHENHYDRAMINE HYDROCHLORIDE 50 MG: 50 CAPSULE ORAL at 08:10

## 2018-10-29 RX ADMIN — DEXTROSE: 50 INJECTION, SOLUTION INTRAVENOUS at 08:10

## 2018-10-29 RX ADMIN — ACETAMINOPHEN 650 MG: 325 TABLET ORAL at 08:10

## 2018-10-29 RX ADMIN — FAMOTIDINE 20 MG: 10 INJECTION, SOLUTION INTRAVENOUS at 08:10

## 2018-10-29 RX ADMIN — RITUXIMAB 1000 MG: 10 INJECTION, SOLUTION INTRAVENOUS at 09:10

## 2018-10-29 NOTE — NURSING
"9:15 am Patient here for Rituxan infusion with father in attendance.  Patient is very emotional. Crying and arguing with her father.  Asked patient if I could be of any assistance to her and she indicated, "My life has been miserable since 2016 when I was diagnosed, just kill me".  Offered to have Dr. Rodriguez, Cancer psychologist, come and speak with her.  Patient refused. Patient stated, "nobody can help me, but thanks for asking and checking on me". Asked patient if she was willing to be treated today. Patient stated, "Yes, I don't have a choice.  We live 1.5 hours away and we can't come all that way for nothing".  Father remains in Infusion Unit but not at chair side.  Augusta Louise RN in the Dr. Guadarrama's clinic, notified of patients emotional state and statements above.  Kasia Samaniego NP made aware of patients emotional state and statements per Augusta Louise RN.  "

## 2018-10-29 NOTE — TELEPHONE ENCOUNTER
Pt got her Rituxan today. She states it makes her feel bad for about 10 days. Pt feels has a lot of fatigue after her infusion and nausea.

## 2018-10-29 NOTE — PLAN OF CARE
Problem: Patient Care Overview (Adult)  Goal: Plan of Care Review  Outcome: Ongoing (interventions implemented as appropriate)  Pt tolerated Rituxan well.  No s/s of reaction. Vitals stable, NAD.

## 2018-10-29 NOTE — TELEPHONE ENCOUNTER
----- Message from William Yuonger sent at 10/29/2018  4:05 PM CDT -----  Contact: Patient @ [phone]  Patient is calling to provide Kasia this email address ( [email or web address] ) she needs an work excuse for  today thru next Wednesday,

## 2018-10-30 NOTE — TELEPHONE ENCOUNTER
----- Message from Jacob Kelly sent at 10/30/2018 10:33 AM CDT -----  Contact: Pt. [phone]  .Needs Advice    Reason for call: The patient would like to speak to someone regarding her doctor's excuse. Please contact the patient to discuss further.          Communication Preference:PHONE     Additional Information:

## 2018-11-07 ENCOUNTER — TELEPHONE (OUTPATIENT)
Dept: NEUROLOGY | Facility: CLINIC | Age: 23
End: 2018-11-07

## 2018-11-07 NOTE — TELEPHONE ENCOUNTER
----- Message from Renaldo Zhang sent at 11/7/2018  9:15 AM CST -----  Needs Advice    Reason for call: Pt states she hasn't heard from Kasia in the last few days, and she's asking to speak w/ her soon about what they were talking about previously        Communication Preference: [phone]    Additional Information:

## 2018-11-12 ENCOUNTER — TELEPHONE (OUTPATIENT)
Dept: GASTROENTEROLOGY | Facility: CLINIC | Age: 23
End: 2018-11-12

## 2018-11-12 NOTE — TELEPHONE ENCOUNTER
----- Message from Jas Sanders sent at 11/12/2018  1:02 PM CST -----  Type: Needs Medical Advice    Who Called:patient  Best Call Back Number: [phone]  Additional Information: patient called needing a work release form saying that she can return to work on Wednesday.please call patient to advise.

## 2018-11-19 ENCOUNTER — TELEPHONE (OUTPATIENT)
Dept: NEUROLOGY | Facility: CLINIC | Age: 23
End: 2018-11-19

## 2018-11-19 NOTE — TELEPHONE ENCOUNTER
----- Message from Renaldo Zhang sent at 11/19/2018 11:36 AM CST -----  Needs Advice    Reason for call: Pt is calling to reschedule missed appt on 11/05/18        Communication Preference: [phone]    Additional Information:

## 2018-11-27 ENCOUNTER — TELEPHONE (OUTPATIENT)
Dept: NEUROLOGY | Facility: CLINIC | Age: 23
End: 2018-11-27

## 2018-11-27 NOTE — TELEPHONE ENCOUNTER
----- Message from Brisa Abel sent at 11/27/2018  9:56 AM CST -----  Contact: self @ [phone]  Pt says she is calling to request lab orders.  Pls call.

## 2018-11-28 ENCOUNTER — TELEPHONE (OUTPATIENT)
Dept: PSYCHIATRY | Facility: CLINIC | Age: 23
End: 2018-11-28

## 2018-11-28 NOTE — TELEPHONE ENCOUNTER
Attempted to reach pt today at most recent number that she provided [phone].  Unable to leave message.  Attempting to follow up with pt regarding her medical POA.

## 2018-11-28 NOTE — TELEPHONE ENCOUNTER
"LATE ENTRY: On 11/7/18 KAMILA Doll and this SW phoned pt to discuss a few issues.  First, she was requesting that we help excuse her from community service requirements 2/2 her medical condition.  Kasia explained that we could not excuse her from this obligation, but that we could recommend that she be allowed to sit and take breaks, because of her pain.  Next, we shared with pt that her father had recently provided a medical POA document, signed by pt during her initial NMO relapse in 2016, and requested that we contact him for all scheduling/appt needs.  Pt states she does not recall signing this document and stated, "I can't remember anything from those months."  SW advised that we were in contact with our legal team regarding how to proceed, and advised that at this time we would contact both pt and father regarding appointments.  Pt expressed understanding.    "

## 2018-11-29 ENCOUNTER — TELEPHONE (OUTPATIENT)
Dept: NEUROLOGY | Facility: CLINIC | Age: 23
End: 2018-11-29

## 2018-11-29 NOTE — TELEPHONE ENCOUNTER
----- Message from Renaldo Zhang sent at 11/29/2018  2:16 PM CST -----  Patient Returning Call from [facility]    Who Left Message for Patient: Jose Deras  Communication Preference: Mr. Ladd (father) @ [phone]  Additional Information: Mr. Ladd states he's returning a call about scheduling labs

## 2018-11-30 ENCOUNTER — LAB VISIT (OUTPATIENT)
Dept: LAB | Facility: HOSPITAL | Age: 23
End: 2018-11-30
Attending: CLINICAL NURSE SPECIALIST
Payer: COMMERCIAL

## 2018-11-30 DIAGNOSIS — G36.0 NEUROMYELITIS OPTICA: ICD-10-CM

## 2018-11-30 LAB
BASOPHILS # BLD AUTO: 0.1 K/UL
BASOPHILS NFR BLD: 0.8 %
DIFFERENTIAL METHOD: ABNORMAL
EOSINOPHIL # BLD AUTO: 0.2 K/UL
EOSINOPHIL NFR BLD: 1.3 %
ERYTHROCYTE [DISTWIDTH] IN BLOOD BY AUTOMATED COUNT: 13.7 %
HCT VFR BLD AUTO: 39.3 %
HGB BLD-MCNC: 12.2 G/DL
IMM GRANULOCYTES # BLD AUTO: 0.26 K/UL
IMM GRANULOCYTES NFR BLD AUTO: 2.2 %
LYMPHOCYTES # BLD AUTO: 3.6 K/UL
LYMPHOCYTES NFR BLD: 30.6 %
MCH RBC QN AUTO: 25.9 PG
MCHC RBC AUTO-ENTMCNC: 31 G/DL
MCV RBC AUTO: 83 FL
MONOCYTES # BLD AUTO: 0.7 K/UL
MONOCYTES NFR BLD: 6.2 %
NEUTROPHILS # BLD AUTO: 7 K/UL
NEUTROPHILS NFR BLD: 58.9 %
NRBC BLD-RTO: 0 /100 WBC
PLATELET # BLD AUTO: 317 K/UL
PMV BLD AUTO: 10.2 FL
RBC # BLD AUTO: 4.71 M/UL
WBC # BLD AUTO: 11.85 K/UL

## 2018-11-30 PROCEDURE — 36415 COLL VENOUS BLD VENIPUNCTURE: CPT | Mod: PO

## 2018-11-30 PROCEDURE — 85025 COMPLETE CBC W/AUTO DIFF WBC: CPT

## 2019-02-05 ENCOUNTER — TELEPHONE (OUTPATIENT)
Dept: NEUROLOGY | Facility: CLINIC | Age: 24
End: 2019-02-05

## 2019-02-05 DIAGNOSIS — G36.0 NEUROMYELITIS OPTICA: Primary | ICD-10-CM

## 2019-03-12 ENCOUNTER — TELEPHONE (OUTPATIENT)
Dept: NEUROLOGY | Facility: CLINIC | Age: 24
End: 2019-03-12

## 2019-03-25 ENCOUNTER — TELEPHONE (OUTPATIENT)
Dept: NEUROLOGY | Facility: CLINIC | Age: 24
End: 2019-03-25

## 2019-03-25 ENCOUNTER — LAB VISIT (OUTPATIENT)
Dept: LAB | Facility: HOSPITAL | Age: 24
End: 2019-03-25
Attending: CLINICAL NURSE SPECIALIST
Payer: COMMERCIAL

## 2019-03-25 DIAGNOSIS — G36.0 NEUROMYELITIS OPTICA: ICD-10-CM

## 2019-03-25 LAB
BASOPHILS # BLD AUTO: 0.06 K/UL (ref 0–0.2)
BASOPHILS NFR BLD: 0.8 % (ref 0–1.9)
DIFFERENTIAL METHOD: ABNORMAL
EOSINOPHIL # BLD AUTO: 0 K/UL (ref 0–0.5)
EOSINOPHIL NFR BLD: 0.5 % (ref 0–8)
ERYTHROCYTE [DISTWIDTH] IN BLOOD BY AUTOMATED COUNT: 14 % (ref 11.5–14.5)
HCT VFR BLD AUTO: 40.1 % (ref 37–48.5)
HGB BLD-MCNC: 12.3 G/DL (ref 12–16)
IMM GRANULOCYTES # BLD AUTO: 0.12 K/UL (ref 0–0.04)
IMM GRANULOCYTES NFR BLD AUTO: 1.5 % (ref 0–0.5)
LYMPHOCYTES # BLD AUTO: 2.3 K/UL (ref 1–4.8)
LYMPHOCYTES NFR BLD: 29.3 % (ref 18–48)
MCH RBC QN AUTO: 25.7 PG (ref 27–31)
MCHC RBC AUTO-ENTMCNC: 30.7 G/DL (ref 32–36)
MCV RBC AUTO: 84 FL (ref 82–98)
MONOCYTES # BLD AUTO: 0.6 K/UL (ref 0.3–1)
MONOCYTES NFR BLD: 7.2 % (ref 4–15)
NEUTROPHILS # BLD AUTO: 4.8 K/UL (ref 1.8–7.7)
NEUTROPHILS NFR BLD: 60.7 % (ref 38–73)
NRBC BLD-RTO: 0 /100 WBC
PLATELET # BLD AUTO: 293 K/UL (ref 150–350)
PMV BLD AUTO: 11 FL (ref 9.2–12.9)
RBC # BLD AUTO: 4.78 M/UL (ref 4–5.4)
WBC # BLD AUTO: 7.88 K/UL (ref 3.9–12.7)

## 2019-03-25 PROCEDURE — 87340 HEPATITIS B SURFACE AG IA: CPT

## 2019-03-25 PROCEDURE — 86356 MONONUCLEAR CELL ANTIGEN: CPT

## 2019-03-25 PROCEDURE — 86704 HEP B CORE ANTIBODY TOTAL: CPT

## 2019-03-25 PROCEDURE — 85025 COMPLETE CBC W/AUTO DIFF WBC: CPT

## 2019-03-25 PROCEDURE — 36415 COLL VENOUS BLD VENIPUNCTURE: CPT | Mod: PO

## 2019-03-25 PROCEDURE — 86706 HEP B SURFACE ANTIBODY: CPT

## 2019-03-25 NOTE — TELEPHONE ENCOUNTER
----- Message from Jaclyn Robins sent at 3/25/2019  8:23 AM CDT -----  Contact: pt@ [phone]  Asking to have Dr. Doll give her a call regarding getting her appts scheduled on the same day as the GI appt. Please call.

## 2019-04-01 LAB — CD20 CELLS NFR SPEC: NORMAL %

## 2019-04-02 ENCOUNTER — TELEPHONE (OUTPATIENT)
Dept: NEUROLOGY | Facility: CLINIC | Age: 24
End: 2019-04-02

## 2019-04-03 NOTE — TELEPHONE ENCOUNTER
Hep B labs negative. CBC stable. DAN=3310. CD19, 20=0.     Ok to proceed with scheduling infusion.

## 2019-04-05 NOTE — TELEPHONE ENCOUNTER
Pt is scheduled for her maintenance Rituxan infusion on 4/25/19. Pt's last infusion was on 10/29/19. Auth approved.

## 2019-04-08 RX ORDER — SODIUM CHLORIDE 0.9 % (FLUSH) 0.9 %
10 SYRINGE (ML) INJECTION
Status: CANCELLED | OUTPATIENT
Start: 2019-04-14

## 2019-04-08 RX ORDER — FAMOTIDINE 10 MG/ML
20 INJECTION INTRAVENOUS
Status: CANCELLED | OUTPATIENT
Start: 2019-04-14

## 2019-04-08 RX ORDER — HEPARIN 100 UNIT/ML
500 SYRINGE INTRAVENOUS
Status: CANCELLED | OUTPATIENT
Start: 2019-04-14

## 2019-04-08 RX ORDER — ACETAMINOPHEN 325 MG/1
650 TABLET ORAL
Status: CANCELLED | OUTPATIENT
Start: 2019-04-14

## 2019-04-25 ENCOUNTER — INFUSION (OUTPATIENT)
Dept: INFUSION THERAPY | Facility: HOSPITAL | Age: 24
End: 2019-04-25
Attending: PSYCHIATRY & NEUROLOGY
Payer: COMMERCIAL

## 2019-04-25 VITALS
RESPIRATION RATE: 18 BRPM | SYSTOLIC BLOOD PRESSURE: 125 MMHG | BODY MASS INDEX: 26.71 KG/M2 | TEMPERATURE: 99 F | DIASTOLIC BLOOD PRESSURE: 59 MMHG | HEART RATE: 112 BPM | HEIGHT: 67 IN | WEIGHT: 170.19 LBS

## 2019-04-25 DIAGNOSIS — G36.0 NEUROMYELITIS OPTICA: Primary | ICD-10-CM

## 2019-04-25 PROCEDURE — 96415 CHEMO IV INFUSION ADDL HR: CPT

## 2019-04-25 PROCEDURE — 63600175 PHARM REV CODE 636 W HCPCS: Mod: JG | Performed by: PSYCHIATRY & NEUROLOGY

## 2019-04-25 PROCEDURE — 25000003 PHARM REV CODE 250: Performed by: PSYCHIATRY & NEUROLOGY

## 2019-04-25 PROCEDURE — 96367 TX/PROPH/DG ADDL SEQ IV INF: CPT

## 2019-04-25 PROCEDURE — 96413 CHEMO IV INFUSION 1 HR: CPT

## 2019-04-25 PROCEDURE — 96375 TX/PRO/DX INJ NEW DRUG ADDON: CPT

## 2019-04-25 PROCEDURE — S0028 INJECTION, FAMOTIDINE, 20 MG: HCPCS | Performed by: PSYCHIATRY & NEUROLOGY

## 2019-04-25 RX ORDER — SODIUM CHLORIDE 0.9 % (FLUSH) 0.9 %
10 SYRINGE (ML) INJECTION
Status: CANCELLED | OUTPATIENT
Start: 2019-10-03

## 2019-04-25 RX ORDER — ACETAMINOPHEN 325 MG/1
650 TABLET ORAL
Status: COMPLETED | OUTPATIENT
Start: 2019-04-25 | End: 2019-04-25

## 2019-04-25 RX ORDER — SODIUM CHLORIDE 0.9 % (FLUSH) 0.9 %
10 SYRINGE (ML) INJECTION
Status: DISCONTINUED | OUTPATIENT
Start: 2019-04-25 | End: 2019-04-25 | Stop reason: HOSPADM

## 2019-04-25 RX ORDER — FAMOTIDINE 10 MG/ML
20 INJECTION INTRAVENOUS
Status: COMPLETED | OUTPATIENT
Start: 2019-04-25 | End: 2019-04-25

## 2019-04-25 RX ORDER — ACETAMINOPHEN 325 MG/1
650 TABLET ORAL
Status: CANCELLED | OUTPATIENT
Start: 2019-10-03

## 2019-04-25 RX ORDER — HEPARIN 100 UNIT/ML
500 SYRINGE INTRAVENOUS
Status: CANCELLED | OUTPATIENT
Start: 2019-10-03

## 2019-04-25 RX ORDER — FAMOTIDINE 10 MG/ML
20 INJECTION INTRAVENOUS
Status: CANCELLED | OUTPATIENT
Start: 2019-10-03

## 2019-04-25 RX ORDER — HEPARIN 100 UNIT/ML
500 SYRINGE INTRAVENOUS
Status: DISCONTINUED | OUTPATIENT
Start: 2019-04-25 | End: 2019-04-25 | Stop reason: HOSPADM

## 2019-04-25 RX ADMIN — FAMOTIDINE 20 MG: 10 INJECTION, SOLUTION INTRAVENOUS at 07:04

## 2019-04-25 RX ADMIN — DEXTROSE: 50 INJECTION, SOLUTION INTRAVENOUS at 08:04

## 2019-04-25 RX ADMIN — ACETAMINOPHEN 650 MG: 325 TABLET ORAL at 07:04

## 2019-04-25 RX ADMIN — RITUXIMAB 1000 MG: 10 INJECTION, SOLUTION INTRAVENOUS at 08:04

## 2019-04-25 RX ADMIN — DIPHENHYDRAMINE HYDROCHLORIDE 50 MG: 50 INJECTION, SOLUTION INTRAMUSCULAR; INTRAVENOUS at 07:04

## 2019-05-20 ENCOUNTER — TELEPHONE (OUTPATIENT)
Dept: NEUROLOGY | Facility: CLINIC | Age: 24
End: 2019-05-20

## 2019-05-20 NOTE — TELEPHONE ENCOUNTER
----- Message from Jaclyn Robins sent at 5/20/2019  1:52 PM CDT -----  Patient Returning Call from [facility]    Who Left Message for Patient:Augusta  Communication Preference:pt @ [phone]  Additional Information:

## 2019-05-20 NOTE — TELEPHONE ENCOUNTER
----- Message from Jaclyn Robins sent at 5/20/2019 10:53 AM CDT -----  Needs Advice    Reason for call:calling to speak with someone regarding a medication. Please call.        Communication Preference:pt @ [phone]    Additional Information:

## 2019-05-29 ENCOUNTER — LAB VISIT (OUTPATIENT)
Dept: LAB | Facility: HOSPITAL | Age: 24
End: 2019-05-29
Attending: CLINICAL NURSE SPECIALIST
Payer: COMMERCIAL

## 2019-05-29 DIAGNOSIS — G36.0 NEUROMYELITIS OPTICA: ICD-10-CM

## 2019-05-29 LAB
BASOPHILS # BLD AUTO: 0.08 K/UL (ref 0–0.2)
BASOPHILS NFR BLD: 1 % (ref 0–1.9)
DIFFERENTIAL METHOD: ABNORMAL
EOSINOPHIL # BLD AUTO: 0.1 K/UL (ref 0–0.5)
EOSINOPHIL NFR BLD: 0.8 % (ref 0–8)
ERYTHROCYTE [DISTWIDTH] IN BLOOD BY AUTOMATED COUNT: 14.3 % (ref 11.5–14.5)
HCT VFR BLD AUTO: 40.3 % (ref 37–48.5)
HGB BLD-MCNC: 12.3 G/DL (ref 12–16)
IMM GRANULOCYTES # BLD AUTO: 0.21 K/UL (ref 0–0.04)
IMM GRANULOCYTES NFR BLD AUTO: 2.5 % (ref 0–0.5)
LYMPHOCYTES # BLD AUTO: 2.5 K/UL (ref 1–4.8)
LYMPHOCYTES NFR BLD: 30.4 % (ref 18–48)
MCH RBC QN AUTO: 25.4 PG (ref 27–31)
MCHC RBC AUTO-ENTMCNC: 30.5 G/DL (ref 32–36)
MCV RBC AUTO: 83 FL (ref 82–98)
MONOCYTES # BLD AUTO: 0.8 K/UL (ref 0.3–1)
MONOCYTES NFR BLD: 9.5 % (ref 4–15)
NEUTROPHILS # BLD AUTO: 4.7 K/UL (ref 1.8–7.7)
NEUTROPHILS NFR BLD: 55.8 % (ref 38–73)
NRBC BLD-RTO: 0 /100 WBC
PLATELET # BLD AUTO: 331 K/UL (ref 150–350)
PMV BLD AUTO: 11 FL (ref 9.2–12.9)
RBC # BLD AUTO: 4.85 M/UL (ref 4–5.4)
WBC # BLD AUTO: 8.33 K/UL (ref 3.9–12.7)

## 2019-05-29 PROCEDURE — 85025 COMPLETE CBC W/AUTO DIFF WBC: CPT

## 2019-05-29 PROCEDURE — 36415 COLL VENOUS BLD VENIPUNCTURE: CPT | Mod: PO

## 2019-07-05 ENCOUNTER — OFFICE VISIT (OUTPATIENT)
Dept: NEUROLOGY | Facility: CLINIC | Age: 24
End: 2019-07-05
Payer: COMMERCIAL

## 2019-07-05 VITALS
SYSTOLIC BLOOD PRESSURE: 119 MMHG | BODY MASS INDEX: 25.84 KG/M2 | WEIGHT: 165 LBS | HEART RATE: 117 BPM | DIASTOLIC BLOOD PRESSURE: 78 MMHG

## 2019-07-05 DIAGNOSIS — D84.9 IMMUNOSUPPRESSION: ICD-10-CM

## 2019-07-05 DIAGNOSIS — G36.0 NEUROMYELITIS OPTICA: Primary | ICD-10-CM

## 2019-07-05 DIAGNOSIS — Z29.89 PROPHYLACTIC IMMUNOTHERAPY: ICD-10-CM

## 2019-07-05 DIAGNOSIS — Z79.899 HIGH RISK MEDICATION USE: ICD-10-CM

## 2019-07-05 DIAGNOSIS — Z71.89 COUNSELING REGARDING GOALS OF CARE: ICD-10-CM

## 2019-07-05 PROCEDURE — 99999 PR PBB SHADOW E&M-EST. PATIENT-LVL III: CPT | Mod: PBBFAC,,, | Performed by: CLINICAL NURSE SPECIALIST

## 2019-07-05 PROCEDURE — 99999 PR PBB SHADOW E&M-EST. PATIENT-LVL III: ICD-10-PCS | Mod: PBBFAC,,, | Performed by: CLINICAL NURSE SPECIALIST

## 2019-07-05 PROCEDURE — 99215 PR OFFICE/OUTPT VISIT, EST, LEVL V, 40-54 MIN: ICD-10-PCS | Mod: S$GLB,,, | Performed by: CLINICAL NURSE SPECIALIST

## 2019-07-05 PROCEDURE — 3008F BODY MASS INDEX DOCD: CPT | Mod: S$GLB,,, | Performed by: CLINICAL NURSE SPECIALIST

## 2019-07-05 PROCEDURE — 3008F PR BODY MASS INDEX (BMI) DOCUMENTED: ICD-10-PCS | Mod: S$GLB,,, | Performed by: CLINICAL NURSE SPECIALIST

## 2019-07-05 PROCEDURE — 99215 OFFICE O/P EST HI 40 MIN: CPT | Mod: S$GLB,,, | Performed by: CLINICAL NURSE SPECIALIST

## 2019-07-05 RX ORDER — OXCARBAZEPINE 300 MG/1
300 TABLET, FILM COATED ORAL 2 TIMES DAILY
COMMUNITY
End: 2020-08-21

## 2019-07-05 RX ORDER — HYDROXYZINE PAMOATE 50 MG/1
50 CAPSULE ORAL 2 TIMES DAILY
COMMUNITY
End: 2020-08-21

## 2019-07-05 NOTE — PROGRESS NOTES
"Subjective:       Patient ID: Nohemy Ladd is a 24 y.o. female who presents today for a routine clinic visit for NMO. The history has been provided by the patient. She was last seen in June 2018.     NMO HPI:  · DMT: Rituxan, last infused in April 2019; due next in October 2019  · Side effects from DMT? No issues with the last infusion   · Taking vitamin D3 as recommended? No   · She has been living on her own in an apartment (through a federally funded program). She has been approved for disability--$700 per month. She is dating someone. She also plans to apply for food stamps. Her car was repossessed in October.   · She was in [facility] for 2 months earlier this year. Once discharged, she went back to live with her aunt.   · She continues to vomit daily (and has for the last 2 years). She is having a colonoscopy and upper GI on Monday. She has  gastroparesis.   · She is not currently exercising, but was walking in the mornings recently.   · She feels generally positive since she now has more independence.     SOCIAL HISTORY  Social History     Tobacco Use    Smoking status: Current Every Day Smoker     Packs/day: 0.25     Types: Cigarettes    Smokeless tobacco: Never Used   Substance Use Topics    Alcohol use: Yes     Comment: socially    Drug use: No     Living arrangements - the patient lives alone.  Employment: receives disability; has [employer] and Medicaid now     NMO ROS:  · Fatigue: Yes - Energy level is low.   · Sleep Disturbance: Yes - She sleeps well at night; takes Vistaril, which helps   · Bladder Dysfunction: Yes - She has urinary frequency, but always has. She denies any recent UTIs.   · Bowel Dysfunction: Yes - She varies between constipation and diarrhea; sees GI doctor   · Spasticity: No  · Visual Symptoms: No   · Cognitive: Yes. Short term memory is "not the best," but she feels like this is better than it was   · Mood Disorder: Yes - Her depression has improved since " "she moved out of her aunt's home.   · Gait Disturbance: No. Her balance is impaired.    · Falls: No  · Hand Dysfunction: She drops her phone often.   · Pain: Yes - She has lower back pain and in between her shoulder blades. This is ongoing.   · Sexual Dysfunction: Not active  · Skin Breakdown: No  · Tremors: No  · Dysphagia:  No  · Dysarthria:  No  · Heat sensitivity:  No; but is "hot-natured" now   · Any un-met adaptive needs? No  · Copay Assist?  Yes - Rituxan covered by insurance.        Objective:        1. 25 foot timed walk: 3.09 seconds today without assist; was 3.12 seconds at last visit without assist     Neurologic Exam     Mental Status   Oriented to person, place, and time.   Attention: normal. Concentration: normal.   Speech: speech is normal   Level of consciousness: alert  Knowledge: good.   Normal comprehension.     Cranial Nerves     CN II   Visual acuity: normal    CN III, IV, VI   Extraocular motions are normal.   Right pupil: Shape: regular. Reactivity: brisk.   Left pupil: Shape: regular. Reactivity: brisk.   CN III: no CN III palsy  CN VI: no CN VI palsy  Nystagmus: none     CN V   Right facial sensation deficit: none  Left facial sensation deficit: none    CN VII   Right facial weakness: none  Left facial weakness: none    CN VIII   Hearing: intact    CN IX, X   Palate: symmetric    CN XI   Right sternocleidomastoid strength: normal  Left sternocleidomastoid strength: normal  Right trapezius strength: normal  Left trapezius strength: normal    CN XII   Tongue deviation: none    Motor Exam   Muscle bulk: normal  Overall muscle tone: normal    Strength   Strength 5/5 throughout.   Right neck flexion: 5/5  Left neck flexion: 5/5  Right neck extension: 5/5  Left neck extension: 5/5  Right deltoid: 5/5  Left deltoid: 5/5  Right biceps: 5/5  Left biceps: 5/5  Right triceps: 5/5  Left triceps: 5/5  Right wrist flexion: 5/5  Left wrist flexion: 5/5  Right wrist extension: 5/5  Left wrist extension: " 5/5  Right interossei: 5/5  Left interossei: 5/5  Right iliopsoas: 5/5  Left iliopsoas: 5/5  Right quadriceps: 5/5  Left quadriceps: 5/5  Right hamstrin/5  Left hamstrin/5  Right anterior tibial: 5/5  Left anterior tibial: 5/5  Right gastroc: 5/5  Left gastroc: 5/5    Sensory Exam   Right arm vibration: normal  Left arm vibration: normal  Right leg vibration: normal  Left leg vibration: normal    Gait, Coordination, and Reflexes     Gait  Gait: normal    Coordination   Romberg: negative  Finger to nose coordination: normal  Heel to shin coordination: normal  Tandem walking coordination: normal    Tremor   Resting tremor: absent    Reflexes   Right brachioradialis: 2+  Left brachioradialis: 2+  Right biceps: 2+  Left biceps: 2+  Right triceps: 2+  Left triceps: 2+  Right patellar: 2+  Left patellar: 2+  Right achilles: 2+  Left achilles: 2+  Right plantar: equivocal  Left plantar: equivocal  Normal rapid sequential movements in upper and lower extremities. She is able to walk on toes and heels and hop on each foot ten times.         Imaging:   No new imaging to review.     Labs:     Lab Results   Component Value Date    [id number] 51 2018       Diagnosis/Assessment/Plan:    1. Neuromyelitis Optica  · Assessment: Nohemy is clinically stable on Rituxan. She seems to be more psychiatrically stable and now has her own apartment and a disability income.   · Imaging: None planned.   · Disease Modifying Therapies: Continue Rituxan every 6 months. Her next infusion will be in October. Will check CBC, CD20 in August and hepatitis B labs, CBC, CD20 in September. She is not taking Vitamin D. Will check Vitamin D level with labs next month.   2. NMO Symptom Assessment / Management              --No change to symptom management plan.     Over 50% of this 40 minute visit was spent in direct face to face counseling of the patient about NMO, DMT considerations, and symptom management. 100% of the visit was spent face  to face.  The patient agrees with the plan of care. She will follow up with Dr. Guadarrama in November.     Kasia Doll, [facility], MSCN        There are no diagnoses linked to this encounter.

## 2019-07-08 ENCOUNTER — PATIENT MESSAGE (OUTPATIENT)
Dept: NEUROLOGY | Facility: CLINIC | Age: 24
End: 2019-07-08

## 2019-07-25 ENCOUNTER — TELEPHONE (OUTPATIENT)
Dept: NEUROLOGY | Facility: CLINIC | Age: 24
End: 2019-07-25

## 2019-07-25 DIAGNOSIS — R11.10 CHRONIC VOMITING: Primary | ICD-10-CM

## 2019-07-25 NOTE — TELEPHONE ENCOUNTER
----- Message from Renaldo Zhang sent at 7/25/2019 10:13 AM CDT -----  Needs Advice    Reason for call: Pt is asking to speak w/ Kasia today, pt said it's very important        Communication Preference: [phone]    Additional Information:

## 2019-07-26 NOTE — TELEPHONE ENCOUNTER
"Spoke with pt; stated that she is "tired of throwing up everyday"; pt stated that she had an upper GI study and colonoscopy performed with no abnormal findings; pt states that she would specifically like to speak with Kasia; advised that I would send her concerns to Kasia to review and advise; pt verbalized understanding;    "

## 2019-08-01 RX ORDER — PROMETHAZINE HYDROCHLORIDE 25 MG/1
TABLET ORAL
Qty: 120 TABLET | Refills: 0 | Status: SHIPPED | OUTPATIENT
Start: 2019-08-01 | End: 2020-08-21

## 2019-08-13 ENCOUNTER — LAB VISIT (OUTPATIENT)
Dept: LAB | Facility: HOSPITAL | Age: 24
End: 2019-08-13
Attending: CLINICAL NURSE SPECIALIST
Payer: COMMERCIAL

## 2019-08-13 DIAGNOSIS — G36.0 NEUROMYELITIS OPTICA: ICD-10-CM

## 2019-08-13 LAB
25(OH)D3+25(OH)D2 SERPL-MCNC: 26 NG/ML (ref 30–96)
BASOPHILS # BLD AUTO: 0.08 K/UL (ref 0–0.2)
BASOPHILS NFR BLD: 1.1 % (ref 0–1.9)
DIFFERENTIAL METHOD: ABNORMAL
EOSINOPHIL # BLD AUTO: 0.1 K/UL (ref 0–0.5)
EOSINOPHIL NFR BLD: 0.7 % (ref 0–8)
ERYTHROCYTE [DISTWIDTH] IN BLOOD BY AUTOMATED COUNT: 14.3 % (ref 11.5–14.5)
HCT VFR BLD AUTO: 41.5 % (ref 37–48.5)
HGB BLD-MCNC: 12.9 G/DL (ref 12–16)
IMM GRANULOCYTES # BLD AUTO: 0.12 K/UL (ref 0–0.04)
IMM GRANULOCYTES NFR BLD AUTO: 1.6 % (ref 0–0.5)
LYMPHOCYTES # BLD AUTO: 1.9 K/UL (ref 1–4.8)
LYMPHOCYTES NFR BLD: 25.2 % (ref 18–48)
MCH RBC QN AUTO: 25.2 PG (ref 27–31)
MCHC RBC AUTO-ENTMCNC: 31.1 G/DL (ref 32–36)
MCV RBC AUTO: 81 FL (ref 82–98)
MONOCYTES # BLD AUTO: 0.6 K/UL (ref 0.3–1)
MONOCYTES NFR BLD: 7.5 % (ref 4–15)
NEUTROPHILS # BLD AUTO: 4.8 K/UL (ref 1.8–7.7)
NEUTROPHILS NFR BLD: 63.9 % (ref 38–73)
NRBC BLD-RTO: 0 /100 WBC
PLATELET # BLD AUTO: 311 K/UL (ref 150–350)
PMV BLD AUTO: 11.1 FL (ref 9.2–12.9)
RBC # BLD AUTO: 5.11 M/UL (ref 4–5.4)
WBC # BLD AUTO: 7.57 K/UL (ref 3.9–12.7)

## 2019-08-13 PROCEDURE — 82306 VITAMIN D 25 HYDROXY: CPT

## 2019-08-13 PROCEDURE — 86356 MONONUCLEAR CELL ANTIGEN: CPT

## 2019-08-13 PROCEDURE — 85025 COMPLETE CBC W/AUTO DIFF WBC: CPT

## 2019-08-13 PROCEDURE — 36415 COLL VENOUS BLD VENIPUNCTURE: CPT | Mod: PO

## 2019-08-19 LAB — CD20 CELLS NFR SPEC: NORMAL %

## 2019-09-24 ENCOUNTER — LAB VISIT (OUTPATIENT)
Dept: LAB | Facility: HOSPITAL | Age: 24
End: 2019-09-24
Attending: CLINICAL NURSE SPECIALIST
Payer: COMMERCIAL

## 2019-09-24 DIAGNOSIS — G36.0 NEUROMYELITIS OPTICA: ICD-10-CM

## 2019-09-24 LAB
BASOPHILS # BLD AUTO: 0.06 K/UL (ref 0–0.2)
BASOPHILS NFR BLD: 0.8 % (ref 0–1.9)
DIFFERENTIAL METHOD: ABNORMAL
EOSINOPHIL # BLD AUTO: 0.1 K/UL (ref 0–0.5)
EOSINOPHIL NFR BLD: 1.2 % (ref 0–8)
ERYTHROCYTE [DISTWIDTH] IN BLOOD BY AUTOMATED COUNT: 15 % (ref 11.5–14.5)
HCT VFR BLD AUTO: 42.2 % (ref 37–48.5)
HGB BLD-MCNC: 12.9 G/DL (ref 12–16)
IMM GRANULOCYTES # BLD AUTO: 0.13 K/UL (ref 0–0.04)
IMM GRANULOCYTES NFR BLD AUTO: 1.8 % (ref 0–0.5)
LYMPHOCYTES # BLD AUTO: 2.1 K/UL (ref 1–4.8)
LYMPHOCYTES NFR BLD: 29.3 % (ref 18–48)
MCH RBC QN AUTO: 25.4 PG (ref 27–31)
MCHC RBC AUTO-ENTMCNC: 30.6 G/DL (ref 32–36)
MCV RBC AUTO: 83 FL (ref 82–98)
MONOCYTES # BLD AUTO: 0.6 K/UL (ref 0.3–1)
MONOCYTES NFR BLD: 8.7 % (ref 4–15)
NEUTROPHILS # BLD AUTO: 4.2 K/UL (ref 1.8–7.7)
NEUTROPHILS NFR BLD: 58.2 % (ref 38–73)
NRBC BLD-RTO: 0 /100 WBC
PLATELET # BLD AUTO: 319 K/UL (ref 150–350)
PMV BLD AUTO: 10.7 FL (ref 9.2–12.9)
RBC # BLD AUTO: 5.07 M/UL (ref 4–5.4)
WBC # BLD AUTO: 7.21 K/UL (ref 3.9–12.7)

## 2019-09-24 PROCEDURE — 86356 MONONUCLEAR CELL ANTIGEN: CPT

## 2019-09-24 PROCEDURE — 85025 COMPLETE CBC W/AUTO DIFF WBC: CPT

## 2019-09-24 PROCEDURE — 36415 COLL VENOUS BLD VENIPUNCTURE: CPT | Mod: PO

## 2019-09-24 PROCEDURE — 87340 HEPATITIS B SURFACE AG IA: CPT

## 2019-09-24 PROCEDURE — 86706 HEP B SURFACE ANTIBODY: CPT

## 2019-09-24 PROCEDURE — 86704 HEP B CORE ANTIBODY TOTAL: CPT

## 2019-09-30 LAB — CD20 CELLS NFR SPEC: NORMAL %

## 2019-10-01 ENCOUNTER — TELEPHONE (OUTPATIENT)
Dept: NEUROLOGY | Facility: CLINIC | Age: 24
End: 2019-10-01

## 2019-10-02 NOTE — TELEPHONE ENCOUNTER
Reviewed Rituxan labs.   Hep B Surface Ag negative  Hep B Surface Ab negative  Hep B Core Ab negative  Cd19, 20=0  EIR=2102    Ok to proceed with scheduling Rituxan infusion.

## 2019-10-22 ENCOUNTER — INFUSION (OUTPATIENT)
Dept: INFUSION THERAPY | Facility: HOSPITAL | Age: 24
End: 2019-10-22
Attending: PSYCHIATRY & NEUROLOGY
Payer: COMMERCIAL

## 2019-10-22 VITALS
HEIGHT: 67 IN | WEIGHT: 165.81 LBS | TEMPERATURE: 98 F | HEART RATE: 94 BPM | DIASTOLIC BLOOD PRESSURE: 55 MMHG | OXYGEN SATURATION: 98 % | SYSTOLIC BLOOD PRESSURE: 92 MMHG | BODY MASS INDEX: 26.02 KG/M2 | RESPIRATION RATE: 18 BRPM

## 2019-10-22 DIAGNOSIS — G36.0 NEUROMYELITIS OPTICA: Primary | ICD-10-CM

## 2019-10-22 PROCEDURE — 25000003 PHARM REV CODE 250: Performed by: PSYCHIATRY & NEUROLOGY

## 2019-10-22 PROCEDURE — 96375 TX/PRO/DX INJ NEW DRUG ADDON: CPT

## 2019-10-22 PROCEDURE — 96365 THER/PROPH/DIAG IV INF INIT: CPT

## 2019-10-22 PROCEDURE — 63600175 PHARM REV CODE 636 W HCPCS: Mod: JG | Performed by: PSYCHIATRY & NEUROLOGY

## 2019-10-22 PROCEDURE — S0028 INJECTION, FAMOTIDINE, 20 MG: HCPCS | Performed by: PSYCHIATRY & NEUROLOGY

## 2019-10-22 PROCEDURE — 96367 TX/PROPH/DG ADDL SEQ IV INF: CPT

## 2019-10-22 PROCEDURE — 96366 THER/PROPH/DIAG IV INF ADDON: CPT

## 2019-10-22 RX ORDER — FAMOTIDINE 10 MG/ML
20 INJECTION INTRAVENOUS
Status: CANCELLED | OUTPATIENT
Start: 2019-10-22

## 2019-10-22 RX ORDER — ACETAMINOPHEN 325 MG/1
650 TABLET ORAL
Status: COMPLETED | OUTPATIENT
Start: 2019-10-22 | End: 2019-10-22

## 2019-10-22 RX ORDER — HEPARIN 100 UNIT/ML
500 SYRINGE INTRAVENOUS
Status: CANCELLED | OUTPATIENT
Start: 2019-10-22

## 2019-10-22 RX ORDER — SODIUM CHLORIDE 0.9 % (FLUSH) 0.9 %
10 SYRINGE (ML) INJECTION
Status: CANCELLED | OUTPATIENT
Start: 2019-10-22

## 2019-10-22 RX ORDER — ACETAMINOPHEN 325 MG/1
650 TABLET ORAL
Status: CANCELLED | OUTPATIENT
Start: 2019-10-22

## 2019-10-22 RX ORDER — FAMOTIDINE 10 MG/ML
20 INJECTION INTRAVENOUS
Status: COMPLETED | OUTPATIENT
Start: 2019-10-22 | End: 2019-10-22

## 2019-10-22 RX ORDER — SODIUM CHLORIDE 0.9 % (FLUSH) 0.9 %
10 SYRINGE (ML) INJECTION
Status: DISCONTINUED | OUTPATIENT
Start: 2019-10-22 | End: 2019-10-22 | Stop reason: HOSPADM

## 2019-10-22 RX ORDER — HEPARIN 100 UNIT/ML
500 SYRINGE INTRAVENOUS
Status: DISCONTINUED | OUTPATIENT
Start: 2019-10-22 | End: 2019-10-22 | Stop reason: HOSPADM

## 2019-10-22 RX ORDER — ONDANSETRON HYDROCHLORIDE 4 MG/5ML
4 SOLUTION ORAL ONCE
Status: COMPLETED | OUTPATIENT
Start: 2019-10-22 | End: 2019-10-22

## 2019-10-22 RX ADMIN — RITUXIMAB 1000 MG: 10 INJECTION, SOLUTION INTRAVENOUS at 09:10

## 2019-10-22 RX ADMIN — ONDANSETRON HYDROCHLORIDE 4 MG: 4 SOLUTION ORAL at 12:10

## 2019-10-22 RX ADMIN — DIPHENHYDRAMINE HYDROCHLORIDE 50 MG: 50 INJECTION, SOLUTION INTRAMUSCULAR; INTRAVENOUS at 09:10

## 2019-10-22 RX ADMIN — FAMOTIDINE 20 MG: 10 INJECTION INTRAVENOUS at 09:10

## 2019-10-22 RX ADMIN — ACETAMINOPHEN 650 MG: 325 TABLET ORAL at 09:10

## 2019-10-22 RX ADMIN — DEXTROSE: 50 INJECTION, SOLUTION INTRAVENOUS at 09:10

## 2019-10-22 NOTE — PLAN OF CARE
Pt tolerated Rituxan infusion well, with no complications or s/s of adverse reaction. VS stable throughout infusion. Left wrist PIV positive for blood return, flushed with normal saline and removed prior to discharge. Catheter tip intact. RTC 12/2/19, pt verbalized understanding. Pt discharged with no distress noted, ambulating independently, accompanied by family member. AVS printed and given to pt.  Pt instructed to notify MD if concerns arise.

## 2019-11-04 ENCOUNTER — TELEPHONE (OUTPATIENT)
Dept: NEUROLOGY | Facility: CLINIC | Age: 24
End: 2019-11-04

## 2019-11-04 NOTE — TELEPHONE ENCOUNTER
----- Message from Torres Winslow sent at 11/4/2019  2:46 PM CST -----  Contact: Nohemy  Ms. Ladd would like for you to contact her. She has been throwing you everyday for over two year. She needs you to contact at [phone] by the end of the day.

## 2019-11-04 NOTE — TELEPHONE ENCOUNTER
Returned call. Pt states she has been vomiting daily for two years, but the symptoms seem to be worse over the last 2 months. She said she had a EGD and colonoscopy 4 months ago that was negative and is scheduled to see GI here at [facility] on 12/2. Offered to schedule sooner appt but pt declined. Pt did admit to stopped her Lithium for bi-polar 2 weeks ago without medical advice. Pt states her current psychiatrist is no longer at [facility]. Strongly advised she call [facility] to see if there is another psychiatrist who can see her or one they can recommend she see. Pt verbalized understanding.

## 2019-11-08 NOTE — TELEPHONE ENCOUNTER
----- Message from Ivy Landin sent at 11/7/2019 11:38 AM CST -----  Contact: Nohemy Baldwin stated she was returning your call. Pt contact number is [phone].

## 2019-11-22 NOTE — TELEPHONE ENCOUNTER
Placed call to pt's. Pt's mom answered and stated Nohemy was admitted to [facility] today.

## 2019-12-02 ENCOUNTER — OFFICE VISIT (OUTPATIENT)
Dept: NEUROLOGY | Facility: CLINIC | Age: 24
End: 2019-12-02
Payer: COMMERCIAL

## 2019-12-02 ENCOUNTER — OFFICE VISIT (OUTPATIENT)
Dept: GASTROENTEROLOGY | Facility: CLINIC | Age: 24
End: 2019-12-02
Payer: COMMERCIAL

## 2019-12-02 VITALS
HEART RATE: 101 BPM | SYSTOLIC BLOOD PRESSURE: 108 MMHG | WEIGHT: 157.19 LBS | BODY MASS INDEX: 24.67 KG/M2 | HEIGHT: 67 IN | DIASTOLIC BLOOD PRESSURE: 76 MMHG

## 2019-12-02 VITALS
HEIGHT: 67 IN | BODY MASS INDEX: 24.65 KG/M2 | DIASTOLIC BLOOD PRESSURE: 66 MMHG | SYSTOLIC BLOOD PRESSURE: 104 MMHG | HEART RATE: 99 BPM | WEIGHT: 157.06 LBS

## 2019-12-02 DIAGNOSIS — R11.2 INTRACTABLE VOMITING WITH NAUSEA, UNSPECIFIED VOMITING TYPE: Primary | ICD-10-CM

## 2019-12-02 DIAGNOSIS — G36.0 NEUROMYELITIS OPTICA: ICD-10-CM

## 2019-12-02 DIAGNOSIS — Z71.89 COUNSELING REGARDING GOALS OF CARE: ICD-10-CM

## 2019-12-02 DIAGNOSIS — D84.9 IMMUNOSUPPRESSION: ICD-10-CM

## 2019-12-02 DIAGNOSIS — F48.2 PSEUDOBULBAR AFFECT: Primary | ICD-10-CM

## 2019-12-02 DIAGNOSIS — R11.10 CHRONIC VOMITING: ICD-10-CM

## 2019-12-02 PROCEDURE — 99215 OFFICE O/P EST HI 40 MIN: CPT | Mod: S$GLB,,, | Performed by: INTERNAL MEDICINE

## 2019-12-02 PROCEDURE — 99215 PR OFFICE/OUTPT VISIT, EST, LEVL V, 40-54 MIN: ICD-10-PCS | Mod: S$GLB,,, | Performed by: PSYCHIATRY & NEUROLOGY

## 2019-12-02 PROCEDURE — 3008F PR BODY MASS INDEX (BMI) DOCUMENTED: ICD-10-PCS | Mod: CPTII,S$GLB,, | Performed by: INTERNAL MEDICINE

## 2019-12-02 PROCEDURE — 3008F BODY MASS INDEX DOCD: CPT | Mod: CPTII,S$GLB,, | Performed by: INTERNAL MEDICINE

## 2019-12-02 PROCEDURE — 99999 PR PBB SHADOW E&M-EST. PATIENT-LVL III: ICD-10-PCS | Mod: PBBFAC,,, | Performed by: PSYCHIATRY & NEUROLOGY

## 2019-12-02 PROCEDURE — 99215 PR OFFICE/OUTPT VISIT, EST, LEVL V, 40-54 MIN: ICD-10-PCS | Mod: S$GLB,,, | Performed by: INTERNAL MEDICINE

## 2019-12-02 PROCEDURE — 99215 OFFICE O/P EST HI 40 MIN: CPT | Mod: S$GLB,,, | Performed by: PSYCHIATRY & NEUROLOGY

## 2019-12-02 PROCEDURE — 99999 PR PBB SHADOW E&M-EST. PATIENT-LVL III: ICD-10-PCS | Mod: PBBFAC,,, | Performed by: INTERNAL MEDICINE

## 2019-12-02 PROCEDURE — 3008F BODY MASS INDEX DOCD: CPT | Mod: CPTII,S$GLB,, | Performed by: PSYCHIATRY & NEUROLOGY

## 2019-12-02 PROCEDURE — 99999 PR PBB SHADOW E&M-EST. PATIENT-LVL III: CPT | Mod: PBBFAC,,, | Performed by: INTERNAL MEDICINE

## 2019-12-02 PROCEDURE — 3008F PR BODY MASS INDEX (BMI) DOCUMENTED: ICD-10-PCS | Mod: CPTII,S$GLB,, | Performed by: PSYCHIATRY & NEUROLOGY

## 2019-12-02 PROCEDURE — 99999 PR PBB SHADOW E&M-EST. PATIENT-LVL III: CPT | Mod: PBBFAC,,, | Performed by: PSYCHIATRY & NEUROLOGY

## 2019-12-02 RX ORDER — AMITRIPTYLINE HYDROCHLORIDE 10 MG/1
10 TABLET, FILM COATED ORAL NIGHTLY PRN
Qty: 30 TABLET | Refills: 3 | Status: SHIPPED | OUTPATIENT
Start: 2019-12-02 | End: 2020-08-21

## 2019-12-02 NOTE — PROGRESS NOTES
Subjective:       Patient ID: Nohemy Ladd is a 24 y.o. female.    Chief Complaint: Emesis (everyday x2 years)    HPI  Review of Systems   Constitutional: Negative for activity change, appetite change, chills, diaphoresis, fatigue, fever and unexpected weight change.   HENT: Negative for congestion, ear pain, mouth sores, nosebleeds, postnasal drip, rhinorrhea, sinus pressure, sore throat, trouble swallowing and voice change.    Eyes: Negative for pain.   Respiratory: Negative for cough, shortness of breath and wheezing.    Cardiovascular: Negative for chest pain, palpitations and leg swelling.   Genitourinary: Negative for difficulty urinating, dysuria, flank pain, hematuria and menstrual problem.   Musculoskeletal: Negative for arthralgias, back pain, gait problem, joint swelling, myalgias and neck pain.   Skin: Negative for rash.   Allergic/Immunologic:        Chronic itching   Neurological: Negative for dizziness, tremors, syncope, numbness and headaches.   Hematological: Negative for adenopathy. Does not bruise/bleed easily.   Psychiatric/Behavioral: Negative for agitation, behavioral problems, confusion, decreased concentration and dysphoric mood. The patient is not nervous/anxious.        Objective:      Physical Exam   Constitutional: She is oriented to person, place, and time. She appears well-developed and well-nourished. No distress.   HENT:   Head: Normocephalic and atraumatic.   Right Ear: External ear normal.   Left Ear: External ear normal.   Nose: Nose normal.   Mouth/Throat: Oropharynx is clear and moist. No oropharyngeal exudate.   Eyes: Pupils are equal, round, and reactive to light. Conjunctivae are normal. No scleral icterus.   Neck: Normal range of motion. Neck supple. No thyromegaly present.   Cardiovascular: Normal rate, regular rhythm, normal heart sounds and intact distal pulses. Exam reveals no gallop.   No murmur heard.  Pulmonary/Chest: Effort normal and breath sounds normal. She  has no wheezes. She has no rales.   Abdominal: Soft. Bowel sounds are normal. She exhibits no distension and no mass. There is no tenderness. There is no rebound and no guarding.   Musculoskeletal: Normal range of motion. She exhibits no edema or tenderness.   Lymphadenopathy:     She has no cervical adenopathy.   Neurological: She is alert and oriented to person, place, and time. No cranial nerve deficit.   Skin: Skin is warm and dry. No rash noted.   Psychiatric: She has a normal mood and affect. Her behavior is normal. Judgment and thought content normal.       Assessment:       1. Intractable vomiting with nausea, unspecified vomiting type        Plan:         Nohemy Ladd is a 24-year-old woman both as a new patient but in actuality this is an established patient 3rd opinion.  Is a very complex case.  her chief complaint is that of vomiting.  She has had vomiting for almost 3 years.  This has started ever since she was diagnosed with her neurologic condition and started on rituximab 2 and half years ago.  The vomiting occurs daily.  There are no foods that trigger vomiting. It does not seem to relate to meals.  She often wakes up 1st thing in the morning and vomits clear bilious material.  If she gets upset or she laughs or even coughs this will trigger a vomiting episode.  She denies any abdominal pain.  There is no pain before vomiting. She does have some heartburn she does not have dysphagia.  She also complains of significant early satiety and likewise significant postprandial fullness.  But again these do not seem to relate to the vomiting episodes.  Her bowels are irregular.  She has constipating alternating with diarrhea.  They seem to be in equal percentage.    She was his seen here at [facility] in 2017 for this.  She had a CT scan and EGD the EGD showed mild esophagitis.    She was seen by GI doctor in the [facility] earlier this year in July.  She had EGD and colonoscopy. she probably had a  gastric emptying scan done. she  saw another doctor in [place] who told her that the main problem was yeast in her stomach although no EGD was done at that time.    Past medication trials  Reglan never tried  Ppi takes daily  Zofran work to then quit working  Phenergan makes her sleepy    Social history she does smoke cigarettes.  She does not smoke marijuana.  She does wonder if it would help her symptoms.    Review of systems curiously she has noticed itching for the last 3 years as well. There is no specific rash. She has some memory loss 2.    Assessment  1.  Chronic vomiting. She carries a diagnosis of gastroparesis.  This certainly may be true.  It does not seem like vomiting relates to the gastroparesis.  And therefore I do not know if treatment specifically for the gastroparesis is going to help the vomiting. At sometime we might consider trying Reglan.  I did briefly discuss this and advised that there are number side effects related to Reglan.  I talked about the surgical invasive procedures for gastroparesis and I am not willing to consider those at this time.  She does not meet the criteria for cyclic vomiting syndrome but nonetheless it might respond to treatment for that.  Therefore my 1st attempt to treating will be using Elavil in low doses at night.  I do want to review the records from the colon.  On make sure that they did random biopsies of the stomach. She would be considered immunocompromised which raises other issues.  With the itching and vomiting I wonder if there might be something like systemic mastocytosis.  Biopsies of the stomach might have revealed this as well. And I do not know how this relates to her neurologic condition.  Clearly from a temporal standpoint it began at the same time.  I also wonder if something like Zelnorm or prucalopride might help her symptoms.  This would be clear-cut to use if she had predominant constipation but she does not. Also consider [id number]    40 min  appointment greater than half the time face-to-face counseling

## 2019-12-02 NOTE — PROGRESS NOTES
Subjective:       Patient ID: Nohemy Ladd is a 24 y.o. female who presents today for a routine clinic visit for NMO.      HPI:  · DMT: Rituxan --April and October;   · Side effects from DMT?  · Taking vitamin D3 as recommended?  · Seeing a new psychiatrist December 24th;    · Living on her own in an apartment; has small car;    · On SSDI;   · Currently single;   · Not taking any psych meds currently--she self-d/c;   · Recently admitted to inpatient psychiatry    SOCIAL HISTORY  Social History     Tobacco Use    Smoking status: Current Every Day Smoker     Packs/day: 0.25     Types: Cigarettes    Smokeless tobacco: Never Used   Substance Use Topics    Alcohol use: Yes     Comment: socially    Drug use: No     Living arrangements - the patient lives with their family.  Employment:  none          Objective:      25 foot timed walk: 4.3s without assist  Neurologic Exam    MENTAL STATUS: fluent; tangential thoughts and pressured speech; labile effect; frequent crying episodes;   CRANIAL NERVE EXAM: There is no internuclear ophthalmoplegia. Extraocular   muscles are intact.  No facial   asymmetry.There is no dysarthria.   MOTOR EXAM: Normal bulk and tone throughout UE and LE bilaterally. Rapid sequential movements are normal. Strength is 5/5 in all groups   in the lower extremities and upper extremities.   REFLEXES: Symmetric and 2+ throughout in all 4 extremities.   SENSORY EXAM: Normal to vibration t/o  COORDINATION: Normal finger-to-nose exam.   GAIT: Narrow based and stable.    Imaging:     No results found for this or any previous visit.  Results for orders placed during the hospital encounter of 07/18/18   MRI Brain Demyelinating W W/O Contrast    Impression Brain appears stable from prior exam as above.  No new demyelinating lesions or abnormal postcontrast enhancement.      Electronically signed by: Nhan Cohn MD  Date:    07/18/2018  Time:    12:40     Labs:     Lab Results   Component Value Date     [id number] 26 (L) 08/13/2019    TOUIILWW39CQ 51 06/19/2018     No results found for: JCVINDEX, JCVANTIBODY  No results found for: WR7MXBUE, ABSOLUTECD3, NH8BBAPK, ABSOLUTECD8, UW8SITOZ, ABSOLUTECD4, LABCD48  Lab Results   Component Value Date    WBC 7.21 09/24/2019    RBC 5.07 09/24/2019    HGB 12.9 09/24/2019    HCT 42.2 09/24/2019    MCV 83 09/24/2019    MCH 25.4 (L) 09/24/2019    MCHC 30.6 (L) 09/24/2019    RDW 15.0 (H) 09/24/2019     09/24/2019    MPV 10.7 09/24/2019    GRAN 4.2 09/24/2019    GRAN 58.2 09/24/2019    LYMPH 2.1 09/24/2019    LYMPH 29.3 09/24/2019    MONO 0.6 09/24/2019    MONO 8.7 09/24/2019    EOS 0.1 09/24/2019    BASO 0.06 09/24/2019    EOSINOPHIL 1.2 09/24/2019    BASOPHIL 0.8 09/24/2019     Sodium   Date Value Ref Range Status   09/21/2017 141 136 - 145 mmol/L Final     Potassium   Date Value Ref Range Status   09/21/2017 4.4 3.5 - 5.1 mmol/L Final     Chloride   Date Value Ref Range Status   09/21/2017 107 95 - 110 mmol/L Final     CO2   Date Value Ref Range Status   09/21/2017 26 23 - 29 mmol/L Final     Glucose   Date Value Ref Range Status   09/21/2017 76 70 - 110 mg/dL Final     BUN, Bld   Date Value Ref Range Status   09/21/2017 8 6 - 20 mg/dL Final     Creatinine   Date Value Ref Range Status   09/21/2017 0.8 0.5 - 1.4 mg/dL Final     Calcium   Date Value Ref Range Status   09/21/2017 9.4 8.7 - 10.5 mg/dL Final     Total Protein   Date Value Ref Range Status   09/21/2017 7.5 6.0 - 8.4 g/dL Final     Albumin   Date Value Ref Range Status   09/21/2017 3.5 3.5 - 5.2 g/dL Final     Total Bilirubin   Date Value Ref Range Status   09/21/2017 0.3 0.1 - 1.0 mg/dL Final     Comment:     For infants and newborns, interpretation of results should be based  on gestational age, weight and in agreement with clinical  observations.  Premature Infant recommended reference ranges:  Up to 24 hours.............<8.0 mg/dL  Up to 48 hours............<12.0 mg/dL  3-5 days..................<15.0  mg/dL  6-29 days.................<15.0 mg/dL       Alkaline Phosphatase   Date Value Ref Range Status   09/21/2017 179 (H) 55 - 135 U/L Final     AST   Date Value Ref Range Status   09/21/2017 31 10 - 40 U/L Final     ALT   Date Value Ref Range Status   09/21/2017 51 (H) 10 - 44 U/L Final     Anion Gap   Date Value Ref Range Status   09/21/2017 8 8 - 16 mmol/L Final     eGFR if    Date Value Ref Range Status   09/21/2017 >60.0 >60 mL/min/1.73 m^2 Final     eGFR if non    Date Value Ref Range Status   09/21/2017 >60.0 >60 mL/min/1.73 m^2 Final     Comment:     Calculation used to obtain the estimated glomerular filtration  rate (eGFR) is the CKD-EPI equation. Since race is unknown   in our information system, the eGFR values for   -American and Non--American patients are given   for each creatinine result.           Diagnosis/Assessment/Plan:    1. NMO  · Assessment: Pt stable but has cognitive / psychiatric effects from her initial relapse;   · Imaging: MRI brain ordered  · Disease Modifying Therapies: continue Rituxan every 6 mo; labs per protocol;     2. Symptom Assessment / Management  · Mood Disorder: will try Nuedexta again for PBA; counseled her not to stop psychiatric meds abruptly in future, and to establish care with local psychiatrist.     F/u Kasia Caalgerard [facility] in March    Our visit today lasted 40 minutes, and 100% of this time was spent face to face with the patient. Over 50% of this visit included discussion of the treatment plan/medication changes/symptom management/exam findings/imaging results/coordination of care. The patient agrees with the plan of care.         Problem List Items Addressed This Visit        1 - High    Neuromyelitis optica    Relevant Orders    Hepatitis B core antibody, total    Hepatitis B surface antibody    Hepatitis B surface antigen    Rituxan Sensitivity    CBC auto differential    MRI Brain Demyelinating W W/O Contrast     Rituxan Sensitivity    CBC auto differential    Hepatitis B core antibody, total    Hepatitis B surface antibody    Hepatitis B surface antigen       Unprioritized    Pseudobulbar affect - Primary    Relevant Medications    dextromethorphan-quinidine 20-10 mg (NUEDEXTA) 20-10 mg per capsule    Immunosuppression    Chronic vomiting      Other Visit Diagnoses     Counseling regarding goals of care

## 2019-12-02 NOTE — LETTER
December 2, 2019      Kasia Doll, APRN, CNS  [address] [facility] [address]           [facility] - Gastroenterology  [address]  [place] [address]  Phone: [phone]  Fax: [phone]          Patient: Nohemy Ladd   MR Number: [id number]   YOB: 1995   Date of Visit: 12/2/2019       Dear Kasia Doll:    Thank you for referring Nohemy Ladd to me for evaluation. Attached you will find relevant portions of my assessment and plan of care.    If you have questions, please do not hesitate to call me. I look forward to following Nohemy Ladd along with you.    Sincerely,    Malik Bolivar MD    Enclosure  CC:  No Recipients    If you would like to receive this communication electronically, please contact [email or web address] or [phone] to request more information on [facility] access.    For providers and/or their staff who would like to refer a patient to [facility], please contact us through our one-stop-shop provider referral line, [facility], at [phone].    If you feel you have received this communication in error or would no longer like to receive these types of communications, please e-mail [email or web address]

## 2019-12-09 PROBLEM — F48.2 PSEUDOBULBAR AFFECT: Status: ACTIVE | Noted: 2019-12-09

## 2019-12-09 PROBLEM — D84.9 IMMUNOSUPPRESSION: Status: ACTIVE | Noted: 2019-12-09

## 2019-12-09 PROBLEM — R11.10 CHRONIC VOMITING: Status: ACTIVE | Noted: 2019-12-09

## 2019-12-09 NOTE — PROGRESS NOTES
Outside record review.  After hours.  EGD done 07/10/2019 by Dr. Matthews.  Indication vomiting.  Normal exam.  No mention of food in the stomach.  Biopsies done showed normal villi in the small bowel.  Mild nonspecific chronic inflammation of the stomach but negative H pylori.  Colonoscopy done by Dr. Matthews 07/09/2019.  Indication hematochezia. Exam of the entire colon.  Good preparation.  Normal exam.  CT scan dated 07/03/2019.  Indication generalized abdominal pain. No acute findings.  Gastric emptying scan dated 08/06/2019.  It was a 4 hr study.  She 1/2 of 126 min.  At 4:00 a.m. 85% had exited so it is mildly abnormal.    This would confirmed mild gastroparesis.

## 2019-12-13 ENCOUNTER — TELEPHONE (OUTPATIENT)
Dept: NEUROLOGY | Facility: CLINIC | Age: 24
End: 2019-12-13

## 2020-01-03 ENCOUNTER — HOSPITAL ENCOUNTER (OUTPATIENT)
Dept: RADIOLOGY | Facility: HOSPITAL | Age: 25
Discharge: HOME | End: 2020-01-03
Attending: PSYCHIATRY & NEUROLOGY
Payer: COMMERCIAL

## 2020-01-03 DIAGNOSIS — G36.0 NEUROMYELITIS OPTICA: ICD-10-CM

## 2020-01-03 PROCEDURE — 70553 MRI BRAIN STEM W/O & W/DYE: CPT | Mod: TC

## 2020-01-03 PROCEDURE — 70553 MRI BRAIN STEM W/O & W/DYE: CPT | Mod: 26,,, | Performed by: RADIOLOGY

## 2020-01-03 PROCEDURE — 70553 MRI BRAIN DEMYELINATING W/ WO CONTRAST: ICD-10-PCS | Mod: 26,,, | Performed by: RADIOLOGY

## 2020-01-03 PROCEDURE — A9585 GADOBUTROL INJECTION: HCPCS | Performed by: PSYCHIATRY & NEUROLOGY

## 2020-01-03 PROCEDURE — 25500020 PHARM REV CODE 255: Performed by: PSYCHIATRY & NEUROLOGY

## 2020-01-03 RX ORDER — GADOBUTROL 604.72 MG/ML
8 INJECTION INTRAVENOUS
Status: COMPLETED | OUTPATIENT
Start: 2020-01-03 | End: 2020-01-03

## 2020-01-03 RX ADMIN — GADOBUTROL 8 ML: 604.72 INJECTION INTRAVENOUS at 02:01

## 2020-02-03 ENCOUNTER — LAB VISIT (OUTPATIENT)
Dept: LAB | Facility: HOSPITAL | Age: 25
End: 2020-02-03
Attending: PSYCHIATRY & NEUROLOGY
Payer: COMMERCIAL

## 2020-02-03 DIAGNOSIS — G36.0 NEUROMYELITIS OPTICA: ICD-10-CM

## 2020-02-03 PROCEDURE — 86356 MONONUCLEAR CELL ANTIGEN: CPT

## 2020-02-03 PROCEDURE — 36415 COLL VENOUS BLD VENIPUNCTURE: CPT | Mod: PO

## 2020-02-07 LAB — CD20 CELLS NFR SPEC: NORMAL %

## 2020-03-04 ENCOUNTER — LAB VISIT (OUTPATIENT)
Dept: LAB | Facility: HOSPITAL | Age: 25
End: 2020-03-04
Attending: PSYCHIATRY & NEUROLOGY
Payer: COMMERCIAL

## 2020-03-04 DIAGNOSIS — G36.0 NEUROMYELITIS OPTICA: ICD-10-CM

## 2020-03-04 LAB
BASOPHILS # BLD AUTO: 0.07 K/UL (ref 0–0.2)
BASOPHILS NFR BLD: 1.1 % (ref 0–1.9)
DIFFERENTIAL METHOD: ABNORMAL
EOSINOPHIL # BLD AUTO: 0 K/UL (ref 0–0.5)
EOSINOPHIL NFR BLD: 0.5 % (ref 0–8)
ERYTHROCYTE [DISTWIDTH] IN BLOOD BY AUTOMATED COUNT: 15.1 % (ref 11.5–14.5)
HCT VFR BLD AUTO: 44.2 % (ref 37–48.5)
HGB BLD-MCNC: 13.1 G/DL (ref 12–16)
IMM GRANULOCYTES # BLD AUTO: 0.05 K/UL (ref 0–0.04)
IMM GRANULOCYTES NFR BLD AUTO: 0.8 % (ref 0–0.5)
LYMPHOCYTES # BLD AUTO: 2.1 K/UL (ref 1–4.8)
LYMPHOCYTES NFR BLD: 31.4 % (ref 18–48)
MCH RBC QN AUTO: 24.6 PG (ref 27–31)
MCHC RBC AUTO-ENTMCNC: 29.6 G/DL (ref 32–36)
MCV RBC AUTO: 83 FL (ref 82–98)
MONOCYTES # BLD AUTO: 0.7 K/UL (ref 0.3–1)
MONOCYTES NFR BLD: 10.1 % (ref 4–15)
NEUTROPHILS # BLD AUTO: 3.7 K/UL (ref 1.8–7.7)
NEUTROPHILS NFR BLD: 56.1 % (ref 38–73)
NRBC BLD-RTO: 0 /100 WBC
PLATELET # BLD AUTO: 286 K/UL (ref 150–350)
PMV BLD AUTO: 11.2 FL (ref 9.2–12.9)
RBC # BLD AUTO: 5.33 M/UL (ref 4–5.4)
WBC # BLD AUTO: 6.56 K/UL (ref 3.9–12.7)

## 2020-03-04 PROCEDURE — 87340 HEPATITIS B SURFACE AG IA: CPT

## 2020-03-04 PROCEDURE — 85025 COMPLETE CBC W/AUTO DIFF WBC: CPT

## 2020-03-04 PROCEDURE — 36415 COLL VENOUS BLD VENIPUNCTURE: CPT | Mod: PO

## 2020-03-04 PROCEDURE — 86356 MONONUCLEAR CELL ANTIGEN: CPT

## 2020-03-04 PROCEDURE — 86706 HEP B SURFACE ANTIBODY: CPT

## 2020-03-04 PROCEDURE — 86704 HEP B CORE ANTIBODY TOTAL: CPT

## 2020-03-09 LAB — CD20 CELLS NFR SPEC: NORMAL %

## 2020-04-08 ENCOUNTER — TELEPHONE (OUTPATIENT)
Dept: NEUROLOGY | Facility: CLINIC | Age: 25
End: 2020-04-08

## 2020-04-08 ENCOUNTER — OFFICE VISIT (OUTPATIENT)
Dept: NEUROLOGY | Facility: CLINIC | Age: 25
End: 2020-04-08
Payer: COMMERCIAL

## 2020-04-08 DIAGNOSIS — R11.10 CHRONIC VOMITING: ICD-10-CM

## 2020-04-08 DIAGNOSIS — G36.0 NEUROMYELITIS OPTICA: Primary | ICD-10-CM

## 2020-04-08 DIAGNOSIS — Z71.89 COUNSELING REGARDING GOALS OF CARE: ICD-10-CM

## 2020-04-08 DIAGNOSIS — Z29.89 PROPHYLACTIC IMMUNOTHERAPY: ICD-10-CM

## 2020-04-08 DIAGNOSIS — Z79.899 HIGH RISK MEDICATION USE: ICD-10-CM

## 2020-04-08 PROCEDURE — 99214 PR OFFICE/OUTPT VISIT, EST, LEVL IV, 30-39 MIN: ICD-10-PCS | Mod: 95,,, | Performed by: CLINICAL NURSE SPECIALIST

## 2020-04-08 PROCEDURE — 99214 OFFICE O/P EST MOD 30 MIN: CPT | Mod: 95,,, | Performed by: CLINICAL NURSE SPECIALIST
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
## 2020-04-08 NOTE — Clinical Note
TQO=2292Gse B Surface Ag negative Hep B Surface Ab negative Hep B Core Ab negative Ok to proceed with scheduling April  Rituxan infusion.

## 2020-04-08 NOTE — Clinical Note
Hi, Dr. Bolivar, I saw Ms. Ladd in a virtual visit yesterday, and she continues to have vomiting several times a day. She is not taking the amitriptyline that you recommended last year, but I will reinforce to her that she should try. Just wanted to make you aware. Thanks. Kasia

## 2020-04-08 NOTE — TELEPHONE ENCOUNTER
----- Message from Jame Duke sent at 4/8/2020  1:33 PM CDT -----  Contact: pt   Please call pt at [phone]    Patient is having problems with her virtual visit     Requesting the provider to just call her phone    Thank you

## 2020-04-09 ENCOUNTER — TELEPHONE (OUTPATIENT)
Dept: NEUROLOGY | Facility: CLINIC | Age: 25
End: 2020-04-09

## 2020-04-09 NOTE — TELEPHONE ENCOUNTER
----- Message from Kasia Doll, APRN, CNS sent at 4/9/2020  4:40 PM CDT -----  DBC=3306    Hep B Surface Ag negative   Hep B Surface Ab negative   Hep B Core Ab negative     Ok to proceed with scheduling April  Rituxan infusion.

## 2020-04-13 ENCOUNTER — TELEPHONE (OUTPATIENT)
Dept: NEUROLOGY | Facility: CLINIC | Age: 25
End: 2020-04-13

## 2020-04-13 NOTE — TELEPHONE ENCOUNTER
----- Message from Jaclyn Robins sent at 4/13/2020  2:17 PM CDT -----  Contact: pt @ [phone]  Calling to speak with someone in Dr. Guadarrama's office regarding scheduling her infusion. Please call.

## 2020-04-14 RX ORDER — HEPARIN 100 UNIT/ML
500 SYRINGE INTRAVENOUS
Status: CANCELLED | OUTPATIENT
Start: 2020-04-14

## 2020-04-14 RX ORDER — SODIUM CHLORIDE 0.9 % (FLUSH) 0.9 %
10 SYRINGE (ML) INJECTION
Status: CANCELLED | OUTPATIENT
Start: 2020-04-14

## 2020-04-14 RX ORDER — ACETAMINOPHEN 325 MG/1
650 TABLET ORAL
Status: CANCELLED | OUTPATIENT
Start: 2020-04-14

## 2020-04-14 RX ORDER — FAMOTIDINE 10 MG/ML
20 INJECTION INTRAVENOUS
Status: CANCELLED | OUTPATIENT
Start: 2020-04-14

## 2020-04-14 NOTE — TELEPHONE ENCOUNTER
----- Message from Tracey Louise sent at 4/14/2020 12:54 PM CDT -----  Contact: [phone]  Pt called in states she would like to speak with [place] regarding her infusion appt. Please call back

## 2020-04-20 ENCOUNTER — PATIENT MESSAGE (OUTPATIENT)
Dept: NEUROLOGY | Facility: CLINIC | Age: 25
End: 2020-04-20

## 2020-04-20 ENCOUNTER — TELEPHONE (OUTPATIENT)
Dept: HEMATOLOGY/ONCOLOGY | Facility: CLINIC | Age: 25
End: 2020-04-20

## 2020-04-20 DIAGNOSIS — Z13.9 SCREENING FOR CONDITION: Primary | ICD-10-CM

## 2020-04-20 NOTE — TELEPHONE ENCOUNTER
Dr Franz,    I am not sure you are aware of our new work flows regarding COVID screening prior to infusion n.  WE are coordinating COVID screenings on all paitents at least who have not been screened within 14 days with covid testing   WE are using our community drive through sites and are working as able with patients prior to infusion to have these done.  WE do not have full access to the rapid testing yet, but hoping to come.    Question on this patient who is scheduled this week for rituxan infusion.    Does she require any labs Prior to this infusion ?   Or is it just the scheduled infusion appt.    If that is the case.   We can attempt to get access if she needs to come this week for rituxan to doing the rapid test when she presents for infusion .    If not, and she requires coordination of lab work We do have ablity to do swabbing in [facility] and [facility] and can attempt to coordinate on same day as she gets labs prior to appt if needed?    Otherwise, We can work with our NP who is coordinating covid testing to order a rapid the day of her infusion .    Please advise on preference.

## 2020-04-20 NOTE — PROGRESS NOTES
04/20/2020      In an effort to protect our immunocompromised patients from potential exposure to COVID-19, [facility] will now require all patients receiving an infusion, an injection, and/or radiation therapy to be tested for COVID-19 prior to their appointment.  All patients currently under treatment will be tested immediately, and patients initiating new treatment cycles or with one-time appointments (injections, transfusions, etc.) must be tested within 72 hours of their appointment.     Placed COVID-19 test order for patient.  A member of our team is to contact the patient in the near future to explain this process and the rationale behind it, to ask the COVID-19 screening questions, and to get the patient scheduled for their COVID-19 test.     The above was completed in accordance with instructions and guidelines set forth by [facility].     Signed,    Wilmer Black, DOROTEO     Date:  04/20/2020

## 2020-04-20 NOTE — TELEPHONE ENCOUNTER
----- Message from Katie Bo sent at 4/20/2020  1:26 PM CDT -----  Regarding: COVID 19  Wilmer Gomez,    I called patient to schedule COVID 19 testing she would like to get testing in [place]     She does not want to come to [place] twice. Offered for her to do it in [place] she states she can not go there today.     Her infusion is scheduled for Wednesday.

## 2020-04-21 ENCOUNTER — TELEPHONE (OUTPATIENT)
Dept: HEMATOLOGY/ONCOLOGY | Facility: CLINIC | Age: 25
End: 2020-04-21

## 2020-04-21 NOTE — TELEPHONE ENCOUNTER
04/21/2020      Phoned the patient.      Discussed the following with the patient:  In an effort to protect our immunocompromised patients from potential exposure to COVID-19, [facility] will now require all patients receiving an infusion, an injection, and/or radiation therapy to be tested for COVID-19 prior to their appointment.  All patients currently under treatment will be tested immediately, and patients initiating new treatment cycles or with one-time appointments (injections, transfusions, etc.) must be tested within 72 hours of their appointment.      Symptom Patient's Response   Fever YES/NO: no   Cough YES/NO: no   Shortness of breath  YES/NO: no   Difficulty breathing YES/NO: no   GI symptoms such as diarrhea or nausea YES/NO: no   Loss of taste YES/NO: no   Loss of smell YES/NO: no     Other Screening Patient's Response   Has the patient previously undergone COVID-19 testing? YES/NO: no     If yes to the question directly above, what was the result? not applicable   Has the patient been in close contact with someone who has undergone COVID-19 testing? YES/NO: no     If yes to the question directly above, what was the result? not applicable      The patient's 24-hour COVID-19 test was ordered and scheduled.  Reviewed the appointment date, time, and location with the patient.      Advised the patient that she can expect the results to take approximately 24 hours and that someone will reach out to her regarding her results.  Advised the patient that her treatment appointment will be rescheduled if she tests positive for COVID-19.      Questions were answered to the patient's satisfaction, and the patient verbalized understanding of information and agreement with the plan.       The above was completed in accordance with instructions and guidelines set forth by [facility].     Katie Santiago     Date:  04/21/2020

## 2020-04-22 ENCOUNTER — INFUSION (OUTPATIENT)
Dept: INFUSION THERAPY | Facility: HOSPITAL | Age: 25
End: 2020-04-22
Payer: COMMERCIAL

## 2020-04-22 ENCOUNTER — CLINICAL SUPPORT (OUTPATIENT)
Dept: HEMATOLOGY/ONCOLOGY | Facility: CLINIC | Age: 25
End: 2020-04-22
Payer: COMMERCIAL

## 2020-04-22 VITALS
HEIGHT: 67 IN | HEART RATE: 95 BPM | BODY MASS INDEX: 21.56 KG/M2 | RESPIRATION RATE: 18 BRPM | DIASTOLIC BLOOD PRESSURE: 56 MMHG | WEIGHT: 137.38 LBS | SYSTOLIC BLOOD PRESSURE: 96 MMHG | TEMPERATURE: 99 F

## 2020-04-22 DIAGNOSIS — G36.0 NEUROMYELITIS OPTICA: Primary | ICD-10-CM

## 2020-04-22 DIAGNOSIS — Z13.9 ENCOUNTER FOR SCREENING: Primary | ICD-10-CM

## 2020-04-22 DIAGNOSIS — Z51.11 ENCOUNTER FOR CHEMOTHERAPY MANAGEMENT: ICD-10-CM

## 2020-04-22 LAB — SARS-COV-2 RDRP RESP QL NAA+PROBE: NEGATIVE

## 2020-04-22 PROCEDURE — A4216 STERILE WATER/SALINE, 10 ML: HCPCS | Performed by: PSYCHIATRY & NEUROLOGY

## 2020-04-22 PROCEDURE — 96413 CHEMO IV INFUSION 1 HR: CPT

## 2020-04-22 PROCEDURE — 25000003 PHARM REV CODE 250: Performed by: PSYCHIATRY & NEUROLOGY

## 2020-04-22 PROCEDURE — 96375 TX/PRO/DX INJ NEW DRUG ADDON: CPT

## 2020-04-22 PROCEDURE — U0002 COVID-19 LAB TEST NON-CDC: HCPCS

## 2020-04-22 PROCEDURE — 63600175 PHARM REV CODE 636 W HCPCS: Mod: JG | Performed by: PSYCHIATRY & NEUROLOGY

## 2020-04-22 PROCEDURE — 96415 CHEMO IV INFUSION ADDL HR: CPT

## 2020-04-22 PROCEDURE — S0028 INJECTION, FAMOTIDINE, 20 MG: HCPCS | Performed by: PSYCHIATRY & NEUROLOGY

## 2020-04-22 PROCEDURE — 96367 TX/PROPH/DG ADDL SEQ IV INF: CPT

## 2020-04-22 RX ORDER — FAMOTIDINE 10 MG/ML
20 INJECTION INTRAVENOUS
Status: CANCELLED | OUTPATIENT
Start: 2020-09-30

## 2020-04-22 RX ORDER — HEPARIN 100 UNIT/ML
500 SYRINGE INTRAVENOUS
Status: CANCELLED | OUTPATIENT
Start: 2020-09-30

## 2020-04-22 RX ORDER — FAMOTIDINE 10 MG/ML
20 INJECTION INTRAVENOUS
Status: COMPLETED | OUTPATIENT
Start: 2020-04-22 | End: 2020-04-22

## 2020-04-22 RX ORDER — SODIUM CHLORIDE 0.9 % (FLUSH) 0.9 %
10 SYRINGE (ML) INJECTION
Status: DISCONTINUED | OUTPATIENT
Start: 2020-04-22 | End: 2020-04-22 | Stop reason: HOSPADM

## 2020-04-22 RX ORDER — SODIUM CHLORIDE 0.9 % (FLUSH) 0.9 %
10 SYRINGE (ML) INJECTION
Status: CANCELLED | OUTPATIENT
Start: 2020-09-30

## 2020-04-22 RX ORDER — HEPARIN 100 UNIT/ML
500 SYRINGE INTRAVENOUS
Status: DISCONTINUED | OUTPATIENT
Start: 2020-04-22 | End: 2020-04-22 | Stop reason: HOSPADM

## 2020-04-22 RX ORDER — ACETAMINOPHEN 325 MG/1
650 TABLET ORAL
Status: CANCELLED | OUTPATIENT
Start: 2020-09-30

## 2020-04-22 RX ORDER — ACETAMINOPHEN 325 MG/1
650 TABLET ORAL
Status: COMPLETED | OUTPATIENT
Start: 2020-04-22 | End: 2020-04-22

## 2020-04-22 RX ADMIN — FAMOTIDINE 20 MG: 10 INJECTION INTRAVENOUS at 08:04

## 2020-04-22 RX ADMIN — DIPHENHYDRAMINE HYDROCHLORIDE 50 MG: 50 INJECTION, SOLUTION INTRAMUSCULAR; INTRAVENOUS at 09:04

## 2020-04-22 RX ADMIN — DEXTROSE: 50 INJECTION, SOLUTION INTRAVENOUS at 09:04

## 2020-04-22 RX ADMIN — ACETAMINOPHEN 650 MG: 325 TABLET ORAL at 08:04

## 2020-04-22 RX ADMIN — Medication 10 ML: at 08:04

## 2020-04-22 RX ADMIN — RITUXIMAB 1000 MG: 10 INJECTION, SOLUTION INTRAVENOUS at 10:04

## 2020-04-22 NOTE — PLAN OF CARE
Rituxan maintenance administered and tolerated well, no adverse reactions vitals remained stable. PIV flushed and removed catheter tip intact site covered. Avs printed and reviewed scheduled to rtn to clinic for MD appt in aug. No questions at this time pt instructed to contact providers clinic with future concerns. Report to ER w/ difficulty breathing or chest pain and to stay well hydrated by drinking at least 8 glasses of water daily if not contraindicated. Escorted to main lobby in w/c and d/c home with boyfriend in stable condition.

## 2020-04-28 ENCOUNTER — OFFICE VISIT (OUTPATIENT)
Dept: NEUROLOGY | Facility: CLINIC | Age: 25
End: 2020-04-28
Payer: COMMERCIAL

## 2020-04-28 DIAGNOSIS — Z71.89 COUNSELING REGARDING GOALS OF CARE: ICD-10-CM

## 2020-04-28 DIAGNOSIS — G36.0 NEUROMYELITIS OPTICA: Primary | ICD-10-CM

## 2020-04-28 PROCEDURE — 99213 PR OFFICE/OUTPT VISIT, EST, LEVL III, 20-29 MIN: ICD-10-PCS | Mod: 95,,, | Performed by: CLINICAL NURSE SPECIALIST

## 2020-04-28 PROCEDURE — 99213 OFFICE O/P EST LOW 20 MIN: CPT | Mod: 95,,, | Performed by: CLINICAL NURSE SPECIALIST
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
## 2020-04-29 ENCOUNTER — TELEPHONE (OUTPATIENT)
Dept: NEUROLOGY | Facility: CLINIC | Age: 25
End: 2020-04-29

## 2020-04-29 NOTE — TELEPHONE ENCOUNTER
----- Message from SCOTT Sullivan, CNS sent at 4/28/2020  4:46 PM CDT -----  We also need to mail her a release form that she can sign and mail back or scan back to me. This is for her therapist.

## 2020-05-13 ENCOUNTER — TELEPHONE (OUTPATIENT)
Dept: NEUROLOGY | Facility: CLINIC | Age: 25
End: 2020-05-13

## 2020-08-12 ENCOUNTER — TELEPHONE (OUTPATIENT)
Dept: NEUROLOGY | Facility: CLINIC | Age: 25
End: 2020-08-12

## 2020-08-19 ENCOUNTER — LAB VISIT (OUTPATIENT)
Dept: LAB | Facility: HOSPITAL | Age: 25
End: 2020-08-19
Payer: COMMERCIAL

## 2020-08-19 ENCOUNTER — TELEPHONE (OUTPATIENT)
Dept: NEUROLOGY | Facility: CLINIC | Age: 25
End: 2020-08-19

## 2020-08-19 ENCOUNTER — OFFICE VISIT (OUTPATIENT)
Dept: NEUROLOGY | Facility: CLINIC | Age: 25
End: 2020-08-19
Payer: COMMERCIAL

## 2020-08-19 VITALS
HEIGHT: 67 IN | SYSTOLIC BLOOD PRESSURE: 105 MMHG | HEART RATE: 106 BPM | WEIGHT: 134.56 LBS | TEMPERATURE: 98 F | BODY MASS INDEX: 21.12 KG/M2 | DIASTOLIC BLOOD PRESSURE: 63 MMHG

## 2020-08-19 DIAGNOSIS — F32.A DEPRESSION, UNSPECIFIED DEPRESSION TYPE: ICD-10-CM

## 2020-08-19 DIAGNOSIS — G36.0 NEUROMYELITIS OPTICA: Primary | ICD-10-CM

## 2020-08-19 DIAGNOSIS — Z29.89 PROPHYLACTIC IMMUNOTHERAPY: ICD-10-CM

## 2020-08-19 DIAGNOSIS — Z71.89 COUNSELING REGARDING GOALS OF CARE: ICD-10-CM

## 2020-08-19 DIAGNOSIS — Z79.899 HIGH RISK MEDICATION USE: ICD-10-CM

## 2020-08-19 DIAGNOSIS — G36.0 NEUROMYELITIS OPTICA: ICD-10-CM

## 2020-08-19 DIAGNOSIS — E04.9 THYROID GOITER: ICD-10-CM

## 2020-08-19 LAB
25(OH)D3+25(OH)D2 SERPL-MCNC: 34 NG/ML (ref 30–96)
BASOPHILS # BLD AUTO: 0.07 K/UL (ref 0–0.2)
BASOPHILS NFR BLD: 0.9 % (ref 0–1.9)
DIFFERENTIAL METHOD: ABNORMAL
EOSINOPHIL # BLD AUTO: 0.1 K/UL (ref 0–0.5)
EOSINOPHIL NFR BLD: 1.3 % (ref 0–8)
ERYTHROCYTE [DISTWIDTH] IN BLOOD BY AUTOMATED COUNT: 13.2 % (ref 11.5–14.5)
HCT VFR BLD AUTO: 42.8 % (ref 37–48.5)
HGB BLD-MCNC: 13.1 G/DL (ref 12–16)
IMM GRANULOCYTES # BLD AUTO: 0.05 K/UL (ref 0–0.04)
IMM GRANULOCYTES NFR BLD AUTO: 0.7 % (ref 0–0.5)
LYMPHOCYTES # BLD AUTO: 3.4 K/UL (ref 1–4.8)
LYMPHOCYTES NFR BLD: 44.5 % (ref 18–48)
MCH RBC QN AUTO: 26.4 PG (ref 27–31)
MCHC RBC AUTO-ENTMCNC: 30.6 G/DL (ref 32–36)
MCV RBC AUTO: 86 FL (ref 82–98)
MONOCYTES # BLD AUTO: 0.7 K/UL (ref 0.3–1)
MONOCYTES NFR BLD: 9.4 % (ref 4–15)
NEUTROPHILS # BLD AUTO: 3.3 K/UL (ref 1.8–7.7)
NEUTROPHILS NFR BLD: 43.2 % (ref 38–73)
NRBC BLD-RTO: 0 /100 WBC
PLATELET # BLD AUTO: 285 K/UL (ref 150–350)
PMV BLD AUTO: 10.7 FL (ref 9.2–12.9)
RBC # BLD AUTO: 4.97 M/UL (ref 4–5.4)
T3 SERPL-MCNC: 91 NG/DL (ref 60–180)
T3FREE SERPL-MCNC: 2.9 PG/ML (ref 2.3–4.2)
T4 FREE SERPL-MCNC: 0.94 NG/DL (ref 0.71–1.51)
T4 SERPL-MCNC: 8.5 UG/DL (ref 4.5–11.5)
THYROPEROXIDASE IGG SERPL-ACNC: <6 IU/ML
TSH SERPL DL<=0.005 MIU/L-ACNC: 1.41 UIU/ML (ref 0.4–4)
VIT B12 SERPL-MCNC: 291 PG/ML (ref 210–950)
WBC # BLD AUTO: 7.62 K/UL (ref 3.9–12.7)

## 2020-08-19 PROCEDURE — 82607 VITAMIN B-12: CPT

## 2020-08-19 PROCEDURE — 36415 COLL VENOUS BLD VENIPUNCTURE: CPT

## 2020-08-19 PROCEDURE — 99215 OFFICE O/P EST HI 40 MIN: CPT | Mod: S$GLB,,, | Performed by: CLINICAL NURSE SPECIALIST

## 2020-08-19 PROCEDURE — 84480 ASSAY TRIIODOTHYRONINE (T3): CPT

## 2020-08-19 PROCEDURE — 84481 FREE ASSAY (FT-3): CPT

## 2020-08-19 PROCEDURE — 82306 VITAMIN D 25 HYDROXY: CPT

## 2020-08-19 PROCEDURE — 84436 ASSAY OF TOTAL THYROXINE: CPT

## 2020-08-19 PROCEDURE — 85025 COMPLETE CBC W/AUTO DIFF WBC: CPT

## 2020-08-19 PROCEDURE — 99999 PR PBB SHADOW E&M-EST. PATIENT-LVL III: ICD-10-PCS | Mod: PBBFAC,,, | Performed by: CLINICAL NURSE SPECIALIST

## 2020-08-19 PROCEDURE — 3008F PR BODY MASS INDEX (BMI) DOCUMENTED: ICD-10-PCS | Mod: CPTII,S$GLB,, | Performed by: CLINICAL NURSE SPECIALIST

## 2020-08-19 PROCEDURE — 99999 PR PBB SHADOW E&M-EST. PATIENT-LVL III: CPT | Mod: PBBFAC,,, | Performed by: CLINICAL NURSE SPECIALIST

## 2020-08-19 PROCEDURE — 84443 ASSAY THYROID STIM HORMONE: CPT

## 2020-08-19 PROCEDURE — 99215 PR OFFICE/OUTPT VISIT, EST, LEVL V, 40-54 MIN: ICD-10-PCS | Mod: S$GLB,,, | Performed by: CLINICAL NURSE SPECIALIST

## 2020-08-19 PROCEDURE — 86376 MICROSOMAL ANTIBODY EACH: CPT

## 2020-08-19 PROCEDURE — 84439 ASSAY OF FREE THYROXINE: CPT

## 2020-08-19 PROCEDURE — 3008F BODY MASS INDEX DOCD: CPT | Mod: CPTII,S$GLB,, | Performed by: CLINICAL NURSE SPECIALIST

## 2020-08-19 NOTE — Clinical Note
Nohemy Arias signed the release form so we can get records from her psychiatrist and counselor. She mentioned that she had a suicide attempt a few months back, but drove herself to the hospital after taking pills and was admitted to a psychiatric hospital for 3-4 days. She has also been evicted from her apartment and is basically couch hopping right now. She does not have a good family support system.

## 2020-08-19 NOTE — PROGRESS NOTES
"Subjective:       Patient ID: Nohemy Ladd is a 25 y.o. female who presents today for a routine clinic visit for NMO. The history has been provided by the patient. She was last seen in April 2020.     NMO HPI:  · DMT: Rituxan; due in October   · Side effects from DMT? No  · Taking vitamin D3 as recommended? No  · She had to move out of her apartment.   · She has lost over 30lbs in the past year without effort. She is worried that she has a thyroid goiter.   · She does not have a place to live right now. She was evicted from her apartment in May. The [place] [employer] program that was paying her rent has lost funding. Nohemy states that she is being sued for what she owes on rent. She stayed with a friend last night.   · She is not speaking to her father right now. She was denied for food stamps. She makes about $800 a month from "[employer] Her mother blocked her, so she does not talk with her mom often. She is no longer dating her boyfriend. Her primary support people are her Aunt Vashti and her grandfather.  · She would like to check her Vit D and B12 today.   · She reports that she tried to commit suicide a few months ago by "taking a lot of pills." She took herself to the hospital after the attempt and had to drink charcoal. She was then admitted to a psychiatric hospital for 3-4 days.   · She has been seeing a psychiatrist once a month and a counselor once a month. Nohemy feels like she needs to be seen once a week by her counselor, but scheduling does not allow for this.   · She just got Medicare part D to cover prescription costs (?)  SOCIAL HISTORY  Social History     Tobacco Use    Smoking status: Current Every Day Smoker     Packs/day: 0.25     Types: Cigarettes    Smokeless tobacco: Never Used   Substance Use Topics    Alcohol use: Yes     Comment: socially    Drug use: No         Objective:        Neurologic Exam      25 foot timed walk: 3.15 seconds today without assist; was 4.3s at last second without " assist  Neurologic Exam  MENTAL STATUS: fluent; tangential thoughts and pressured speech; labile effect; frequent crying episodes;   CRANIAL NERVE EXAM: There is no internuclear ophthalmoplegia. Extraocular   muscles are intact.  No facial asymmetry.There is no dysarthria.   MOTOR EXAM: Normal bulk and tone throughout UE and LE bilaterally. Rapid sequential movements are normal. Strength is 5/5 in all groups in the lower extremities and upper extremities.   REFLEXES: Symmetric and 1+ throughout in all 4 extremities.   SENSORY EXAM: Normal to vibration t/o  COORDINATION: Normal finger-to-nose exam.   GAIT: Narrow based and stable.     Nohemy has some obvious swelling to the center and right side of her neck, concerning for thyroid goiter. Uon palpation, it does feel enlarged.   Imaging:   No new imaging to review.  Labs:     Lab Results   Component Value Date    [id number] 26 (L) 08/13/2019    [id number] 51 06/19/2018         Diagnosis/Assessment/Plan:    1. Neuromyelitis Optica  · Assessment: Nohemy is clinically stable from an NMO standpoint. I am concerned about her thyroid. We will order thyroid function panel and ultrasound today, and I will refer her to endocrinology once results are back. She continues to struggle with her mental health and does not seem to have a solid support system. She will sign release form today so that we can get records from her psychiatrist and counselor. I will also ask Juana Abad LCSW to reach out to her to explore options for housing in her area. She continues to have vomiting on an almost daily basis. I will confirm with Dr. Bolivar that she can restart Elavil at previously recommended dose.   · Imaging: None planned   · Disease Modifying Therapies: Continue Rituxan. Her next infusion is due in October. Will check labs today for CBC, CD20, and orders are in CBC, Cd20, hepatitis B in September. She is aware of the risks associated with immunosuppressant therapy, including  increased risk of infection.       Over 50% of this 40 minute visit was spent in direct face to face counseling of the patient about NMO, DMT considerations, and symptom management. The patient agrees with the plan of care. She will follow up with Dr. Guadarrama in 4 months.     Kasia Doll, AGCNS-BC, MSCN    Thyroid goiter  -     US Thyroid; Future; Expected date: 08/19/2020  -     Thyroid peroxidase antibody; Future; Expected date: 08/19/2020  -     TSH; Future; Expected date: 08/19/2020  -     T3; Future; Expected date: 08/19/2020  -     T4; Future; Expected date: 08/19/2020  -     T3, free; Future; Expected date: 08/19/2020  -     T4, free; Future; Expected date: 08/19/2020

## 2020-08-26 ENCOUNTER — TELEPHONE (OUTPATIENT)
Dept: NEUROLOGY | Facility: CLINIC | Age: 25
End: 2020-08-26

## 2020-08-26 DIAGNOSIS — G36.0 NEUROMYELITIS OPTICA: Primary | ICD-10-CM

## 2020-08-26 RX ORDER — AMITRIPTYLINE HYDROCHLORIDE 25 MG/1
25 TABLET, FILM COATED ORAL NIGHTLY PRN
Qty: 30 TABLET | Refills: 3 | Status: SHIPPED | OUTPATIENT
Start: 2020-08-26 | End: 2021-07-07

## 2020-08-31 ENCOUNTER — TELEPHONE (OUTPATIENT)
Dept: NEUROLOGY | Facility: CLINIC | Age: 25
End: 2020-08-31

## 2020-09-02 ENCOUNTER — TELEPHONE (OUTPATIENT)
Dept: RADIOLOGY | Facility: HOSPITAL | Age: 25
End: 2020-09-02

## 2020-09-03 ENCOUNTER — HOSPITAL ENCOUNTER (OUTPATIENT)
Dept: RADIOLOGY | Facility: HOSPITAL | Age: 25
Discharge: HOME | End: 2020-09-03
Attending: CLINICAL NURSE SPECIALIST
Payer: COMMERCIAL

## 2020-09-03 DIAGNOSIS — E04.9 THYROID GOITER: ICD-10-CM

## 2020-09-03 PROCEDURE — 76536 US EXAM OF HEAD AND NECK: CPT | Mod: 26,,, | Performed by: RADIOLOGY

## 2020-09-03 PROCEDURE — 76536 US THYROID: ICD-10-PCS | Mod: 26,,, | Performed by: RADIOLOGY

## 2020-09-03 PROCEDURE — 76536 US EXAM OF HEAD AND NECK: CPT | Mod: TC,PO

## 2020-09-15 ENCOUNTER — TELEPHONE (OUTPATIENT)
Dept: NEUROLOGY | Facility: CLINIC | Age: 25
End: 2020-09-15

## 2020-09-15 DIAGNOSIS — Z13.9 SCREENING FOR CONDITION: Primary | ICD-10-CM

## 2020-09-17 ENCOUNTER — TELEPHONE (OUTPATIENT)
Dept: NEUROLOGY | Facility: CLINIC | Age: 25
End: 2020-09-17

## 2020-09-18 NOTE — TELEPHONE ENCOUNTER
Reviewed labs from 9/3/20:   Hep B Core Ab negative, Surface Ab negative, Surface Ag negative  CD19, 20=0  WBC=6.35; AVT=4531

## 2020-10-20 ENCOUNTER — TELEPHONE (OUTPATIENT)
Dept: NEUROLOGY | Facility: CLINIC | Age: 25
End: 2020-10-20

## 2020-10-20 NOTE — TELEPHONE ENCOUNTER
----- Message from Jacob Kelly sent at 10/20/2020 10:56 AM CDT -----  Contact: Pt. [phone]  The patient would like to speak to someone regarding her COVID testing. Please contact the patient to discuss further.

## 2020-10-20 NOTE — TELEPHONE ENCOUNTER
----- Message from William Younger sent at 10/20/2020  9:21 AM CDT -----  Contact: Pt @[phone]  Patient requesting a return call about the order for the Covid-19 test, pls call

## 2020-10-21 ENCOUNTER — TELEPHONE (OUTPATIENT)
Dept: PSYCHIATRY | Facility: CLINIC | Age: 25
End: 2020-10-21

## 2020-10-21 NOTE — TELEPHONE ENCOUNTER
Called to speak with pt's therapist, Esther Lara LCSW, at [facility].  Faxed MIKE and am awaiting return call.

## 2020-10-22 ENCOUNTER — INFUSION (OUTPATIENT)
Dept: INFUSION THERAPY | Facility: HOSPITAL | Age: 25
End: 2020-10-22
Attending: PSYCHIATRY & NEUROLOGY
Payer: COMMERCIAL

## 2020-10-22 VITALS
SYSTOLIC BLOOD PRESSURE: 99 MMHG | TEMPERATURE: 98 F | HEART RATE: 68 BPM | HEIGHT: 67 IN | DIASTOLIC BLOOD PRESSURE: 54 MMHG | WEIGHT: 132.25 LBS | BODY MASS INDEX: 20.76 KG/M2 | RESPIRATION RATE: 18 BRPM

## 2020-10-22 DIAGNOSIS — G36.0 NEUROMYELITIS OPTICA: Primary | ICD-10-CM

## 2020-10-22 PROCEDURE — 63600175 PHARM REV CODE 636 W HCPCS: Performed by: PSYCHIATRY & NEUROLOGY

## 2020-10-22 PROCEDURE — 96375 TX/PRO/DX INJ NEW DRUG ADDON: CPT

## 2020-10-22 PROCEDURE — 96367 TX/PROPH/DG ADDL SEQ IV INF: CPT

## 2020-10-22 PROCEDURE — 25000003 PHARM REV CODE 250: Performed by: PSYCHIATRY & NEUROLOGY

## 2020-10-22 PROCEDURE — 96413 CHEMO IV INFUSION 1 HR: CPT

## 2020-10-22 PROCEDURE — 96415 CHEMO IV INFUSION ADDL HR: CPT

## 2020-10-22 RX ORDER — FAMOTIDINE 10 MG/ML
20 INJECTION INTRAVENOUS
Status: CANCELLED | OUTPATIENT
Start: 2021-03-25

## 2020-10-22 RX ORDER — FAMOTIDINE 10 MG/ML
20 INJECTION INTRAVENOUS
Status: COMPLETED | OUTPATIENT
Start: 2020-10-22 | End: 2020-10-22

## 2020-10-22 RX ORDER — ACETAMINOPHEN 325 MG/1
650 TABLET ORAL
Status: COMPLETED | OUTPATIENT
Start: 2020-10-22 | End: 2020-10-22

## 2020-10-22 RX ORDER — HEPARIN 100 UNIT/ML
500 SYRINGE INTRAVENOUS
Status: DISCONTINUED | OUTPATIENT
Start: 2020-10-22 | End: 2020-10-22 | Stop reason: HOSPADM

## 2020-10-22 RX ORDER — ACETAMINOPHEN 325 MG/1
650 TABLET ORAL
Status: CANCELLED | OUTPATIENT
Start: 2021-03-25

## 2020-10-22 RX ORDER — SODIUM CHLORIDE 0.9 % (FLUSH) 0.9 %
10 SYRINGE (ML) INJECTION
Status: DISCONTINUED | OUTPATIENT
Start: 2020-10-22 | End: 2020-10-22 | Stop reason: HOSPADM

## 2020-10-22 RX ORDER — SODIUM CHLORIDE 0.9 % (FLUSH) 0.9 %
10 SYRINGE (ML) INJECTION
Status: CANCELLED | OUTPATIENT
Start: 2021-03-25

## 2020-10-22 RX ORDER — HEPARIN 100 UNIT/ML
500 SYRINGE INTRAVENOUS
Status: CANCELLED | OUTPATIENT
Start: 2021-03-25

## 2020-10-22 RX ADMIN — FAMOTIDINE 20 MG: 10 INJECTION INTRAVENOUS at 09:10

## 2020-10-22 RX ADMIN — RITUXIMAB 1000 MG: 10 INJECTION, SOLUTION INTRAVENOUS at 10:10

## 2020-10-22 RX ADMIN — DEXTROSE MONOHYDRATE: 50 INJECTION, SOLUTION INTRAVENOUS at 09:10

## 2020-10-22 RX ADMIN — ACETAMINOPHEN 650 MG: 325 TABLET ORAL at 09:10

## 2020-10-22 RX ADMIN — DIPHENHYDRAMINE HYDROCHLORIDE 50 MG: 50 INJECTION, SOLUTION INTRAMUSCULAR; INTRAVENOUS at 09:10

## 2020-10-22 NOTE — PLAN OF CARE
0964 Pt had a nasal swab covid test done at an outside facility on 10/20, test results: negative.     1337 pt completed and tolerated rituxan without any adverse reaction. VSS, pt voiced no new complaints or concerns at this time. NAD noted, pt d/c home

## 2020-10-23 NOTE — TELEPHONE ENCOUNTER
"F/U call to Esther Lara LCSW at [facility].  She advised that Nohemy is also seeing a psychiatrist in [place].  Nohemy is being treated for bipolar disorder and Esther shared that she observes impulsivity, aggressive behavior, flight of ideas, appointment non-compliance, difficulty tolerating frustration (being told 'no'), etc.  States Nohemy had serious legal issues and was in the local news, but did not give details of the circumstances. States Nohemy is using marijuana, but isn't sure how often or how much.    She would also like Nohemy to undergo cognitive testing, but states there are no local resources.  Nohemy would also prefer to maintain care at [facility].  Nohemy has transportation challenges, but could use her [place] Medicaid.  She tells Esther she's looked into this and "it won't work".  Etsher suspects Nohemy doesn't want to use the service.      Esther can get pt into housing - supportive housing or Section 8, but Nohemy has not obtained her ID, SS card, etc.  Therapist has provided verbal and written instructions to both pt and mother but neither has followed up for 3 months.  She states Nohemy only seems motivated to follow through when in crisis.   "

## 2021-01-25 ENCOUNTER — TELEPHONE (OUTPATIENT)
Dept: NEUROLOGY | Facility: CLINIC | Age: 26
End: 2021-01-25

## 2021-01-25 NOTE — TELEPHONE ENCOUNTER
----- Message from Ivy Landin sent at 1/25/2021 11:31 AM CST -----  Contact: Tanner Ladd (father)  Patient stated he needed to speak with someone in the office about her insurance. Patient father contact number is [phone]. Patient father needed to speak with someone ASAP.

## 2021-01-29 NOTE — TELEPHONE ENCOUNTER
Phoned pt's father Tannre Ladd [phone].  [facility] for him to call re: insurance issue.

## 2021-02-03 NOTE — TELEPHONE ENCOUNTER
Call back from pt's father:  He advised that pt is in a psychiatric institution for fourth time in about a year. He states she's using heavy marijuana and is not taking her bipolar medications.  Is homeless. Currently at [facility] in [place] per court order. He shared she has been making very unsafe choices (traveling out-of-state with people she doesn't know, fight with [employer] ), has lost a lot of weight, too.  Is now considered a vulnerable adult by courts, so she can stay on father's insurance indefinitely IF they become her guardians.  Tanner is working on guardianship now.      SW explained that her rituxumab will continue to be authorized through [employer] and if she loses that insurance her [place] is accepted at [facility], which he found reassuring. He will follow up if insurance changes.

## 2021-03-03 ENCOUNTER — TELEPHONE (OUTPATIENT)
Dept: NEUROLOGY | Facility: CLINIC | Age: 26
End: 2021-03-03

## 2021-03-03 NOTE — TELEPHONE ENCOUNTER
----- Message from Ivy Landin sent at 3/3/2021 12:28 PM CST -----  Contact: Tanner Ladd (father)  Patient father is calling to speak with someone in the office about the patient . Patient is in a stated hospital and the hospital is wanting to do a test and dad just want to make sure it's ok.  Patient father contact number is [phone].

## 2021-03-05 NOTE — TELEPHONE ENCOUNTER
We spoke to patient's father to let him know that in general an NMO diagnosis is not a contraindication to ECT.

## 2021-03-05 NOTE — TELEPHONE ENCOUNTER
----- Message from Nizta Fuentes sent at 3/5/2021  9:12 AM CST -----  Contact: Tanner # [phone]  Pt returning phone call to [place]

## 2021-03-25 ENCOUNTER — TELEPHONE (OUTPATIENT)
Dept: NEUROLOGY | Facility: CLINIC | Age: 26
End: 2021-03-25

## 2021-03-25 NOTE — TELEPHONE ENCOUNTER
Left  for Abida, Case Mngr, at [facility] requesting return call. Pt's father reports pt has had ECT, but so far has not been successful. Her dad is trying to find a place for Nohemy to go once she's discharged, which he feels will be soon.

## 2021-04-05 NOTE — TELEPHONE ENCOUNTER
BROWNM katerina Tai, , at [facility] in [place] [phone].  Advised in VM that pt is due for a medically necessary infusion at the end of the month and needs labs completed now.  Requested a call back ASAP.

## 2021-04-06 NOTE — TELEPHONE ENCOUNTER
NAM left another VM for Abida Carnes, , at [facility] re: labs for upcoming infusion.

## 2021-04-08 NOTE — TELEPHONE ENCOUNTER
Phoned [facility] and spoke with Alicia, who advised that Abida the  is no loner with [facility] effective 4/2/21.  She advised we speak with Anne Marie Sanchez, CM supervisor.  M for Ms. Sanchez at [phone].

## 2021-04-08 NOTE — TELEPHONE ENCOUNTER
Spoke with pt's father to see if he had another contact at [facility] as we are unable to get a return call from the . Pt's dad said he does not, and he has not gotten a call from them in a week.

## 2021-04-09 NOTE — TELEPHONE ENCOUNTER
Spoke with Anne Marie at [facility], who provided the fax number to send over Rituxan safety labs. She is currently trying to find placement for Nohemy, but is unsure if pt will be able to travel to [place] for infusion.

## 2021-04-13 NOTE — TELEPHONE ENCOUNTER
Spoke with Sanjuanita. As we know, Nohemy is in an inpatient psychiatric facility. The plan is for her to be discharged to a group home or an apartment. Her discharge date is not known at this time, but the hope is that it will be before the end of the month. She cannot be transferred across state lines for any medical care.     We could order a B cell lab. Sanjuanita is going to ask the lab at the hospital if it is something that could be sent out to [facility]. If we want to switch to [facility], it would have to be approved by the medical director. If it is expensive, it may not be approved. The alternative would be to send it to her local pharmacy and have a family member pick it up and bring it to her.     She did have some basic labs drawn at the psychiatric hospital within the past two months. Those results will be faxed over to us shortly. I will follow up with Sanjuanita towards the end of the week.

## 2021-04-13 NOTE — TELEPHONE ENCOUNTER
Spoke with Sanjuanita Wong NP, with [facility]. Calling to discuss pt's care. Sanjuanita can be reached by [phone] (office)  [phone] (cell)

## 2021-04-22 NOTE — TELEPHONE ENCOUNTER
Spoke with Sanjuanita Wong NP. She reported that Nohemy is being discharged tomorrow.    Labs reviewed:    Normal CMP on 3/12/21    WBC=6.7 on 3/16/21  IKA=0280  Quantiferon negative on 2/26/21   Lyme Ab negative on 3/31/21  HIV negative     Hep B Surface Ag negative on 2/9/21  Hep C Ab negative  RPR negative     TSH and B12 normal    Vit D=26 (low)

## 2021-04-26 ENCOUNTER — TELEPHONE (OUTPATIENT)
Dept: NEUROLOGY | Facility: CLINIC | Age: 26
End: 2021-04-26

## 2021-04-26 RX ORDER — DOCUSATE SODIUM 100 MG/1
10 CAPSULE, LIQUID FILLED ORAL 2 TIMES DAILY
COMMUNITY

## 2021-04-26 RX ORDER — VIT C/E/ZN/COPPR/LUTEIN/ZEAXAN 250MG-90MG
1000 CAPSULE ORAL DAILY
COMMUNITY

## 2021-04-26 RX ORDER — BENZTROPINE MESYLATE 1 MG/1
1 TABLET ORAL 2 TIMES DAILY
COMMUNITY
End: 2021-05-31 | Stop reason: SDUPTHER

## 2021-04-26 RX ORDER — HALOPERIDOL 5 MG/1
5 TABLET ORAL 2 TIMES DAILY
COMMUNITY
End: 2021-05-31 | Stop reason: SDUPTHER

## 2021-04-26 RX ORDER — LANOLIN ALCOHOL/MO/W.PET/CERES
100 CREAM (GRAM) TOPICAL DAILY
COMMUNITY

## 2021-04-26 RX ORDER — SERTRALINE HYDROCHLORIDE 50 MG/1
50 TABLET, FILM COATED ORAL DAILY
COMMUNITY
End: 2021-05-31 | Stop reason: SDUPTHER

## 2021-04-26 RX ORDER — OMEPRAZOLE 40 MG/1
20 CAPSULE, DELAYED RELEASE ORAL DAILY
COMMUNITY

## 2021-04-26 RX ORDER — PROPRANOLOL HYDROCHLORIDE 10 MG/1
60 TABLET ORAL DAILY
COMMUNITY
End: 2021-05-31 | Stop reason: SDUPTHER

## 2021-04-26 RX ORDER — GABAPENTIN 300 MG/1
300 CAPSULE ORAL DAILY
COMMUNITY
End: 2021-05-31 | Stop reason: SDUPTHER

## 2021-04-26 RX ORDER — GABAPENTIN 100 MG/1
100 CAPSULE ORAL DAILY
COMMUNITY
End: 2021-05-31 | Stop reason: SDUPTHER

## 2021-04-26 NOTE — TELEPHONE ENCOUNTER
----- Message from Elizabeth Morrison sent at 4/26/2021 10:17 AM CDT -----  Regarding: speak with nurse  Contact: patient mother  [phone]   or work # [phone] please call patient mother would like to know how many people are allowed on visit tomorrow waiting on a call back thanks.

## 2021-04-26 NOTE — TELEPHONE ENCOUNTER
----- Message from Connie Ruth sent at 4/26/2021  8:39 AM CDT -----  Nohemy Benson calling regarding Patient symptoms. He stated it is urgent. [phone]

## 2021-04-26 NOTE — TELEPHONE ENCOUNTER
"Returned call to pt's dad. He states since pt has been discharged she has short and long term memories issues. She does not remember the last 6 months of treatment, or recent conversations. Her balance is a little off, "unable to drink without dribbling out of her mouth, holds her mouth weird when talking, holds her arms straight down to her side." Her also notes her speech is extremely slow. He recalls some of these symptoms when pt was first dx, but also wanders if it could be related her medications. Pt's dad to call me back with the names/dosages of her new medications. Per pt's dad pt had 11 ECT treatments while hospitalized recently.  "

## 2021-04-27 ENCOUNTER — HOSPITAL ENCOUNTER (OUTPATIENT)
Dept: RADIOLOGY | Facility: HOSPITAL | Age: 26
Discharge: HOME | End: 2021-04-27
Attending: CLINICAL NURSE SPECIALIST
Payer: COMMERCIAL

## 2021-04-27 ENCOUNTER — OFFICE VISIT (OUTPATIENT)
Dept: NEUROLOGY | Facility: CLINIC | Age: 26
End: 2021-04-27
Payer: COMMERCIAL

## 2021-04-27 VITALS
SYSTOLIC BLOOD PRESSURE: 87 MMHG | HEIGHT: 67 IN | DIASTOLIC BLOOD PRESSURE: 60 MMHG | BODY MASS INDEX: 18.39 KG/M2 | HEART RATE: 84 BPM | TEMPERATURE: 98 F | WEIGHT: 117.19 LBS

## 2021-04-27 DIAGNOSIS — G36.0 NEUROMYELITIS OPTICA: ICD-10-CM

## 2021-04-27 DIAGNOSIS — Z71.89 COUNSELING REGARDING GOALS OF CARE: ICD-10-CM

## 2021-04-27 DIAGNOSIS — G36.0 NEUROMYELITIS OPTICA: Primary | ICD-10-CM

## 2021-04-27 DIAGNOSIS — Z29.89 PROPHYLACTIC IMMUNOTHERAPY: ICD-10-CM

## 2021-04-27 DIAGNOSIS — Z79.899 HIGH RISK MEDICATION USE: ICD-10-CM

## 2021-04-27 PROCEDURE — 99999 PR PBB SHADOW E&M-EST. PATIENT-LVL IV: ICD-10-PCS | Mod: PBBFAC,,, | Performed by: CLINICAL NURSE SPECIALIST

## 2021-04-27 PROCEDURE — 70553 MRI BRAIN STEM W/O & W/DYE: CPT | Mod: 26,,, | Performed by: RADIOLOGY

## 2021-04-27 PROCEDURE — 1125F AMNT PAIN NOTED PAIN PRSNT: CPT | Mod: S$GLB,,, | Performed by: CLINICAL NURSE SPECIALIST

## 2021-04-27 PROCEDURE — 99999 PR PBB SHADOW E&M-EST. PATIENT-LVL IV: CPT | Mod: PBBFAC,,, | Performed by: CLINICAL NURSE SPECIALIST

## 2021-04-27 PROCEDURE — 99215 OFFICE O/P EST HI 40 MIN: CPT | Mod: S$GLB,,, | Performed by: CLINICAL NURSE SPECIALIST

## 2021-04-27 PROCEDURE — A9585 GADOBUTROL INJECTION: HCPCS | Performed by: CLINICAL NURSE SPECIALIST

## 2021-04-27 PROCEDURE — 70553 MRI BRAIN STEM W/O & W/DYE: CPT | Mod: TC

## 2021-04-27 PROCEDURE — 25500020 PHARM REV CODE 255: Performed by: CLINICAL NURSE SPECIALIST

## 2021-04-27 PROCEDURE — 3008F BODY MASS INDEX DOCD: CPT | Mod: CPTII,S$GLB,, | Performed by: CLINICAL NURSE SPECIALIST

## 2021-04-27 PROCEDURE — 70553 MRI BRAIN DEMYELINATING W/ WO CONTRAST: ICD-10-PCS | Mod: 26,,, | Performed by: RADIOLOGY

## 2021-04-27 PROCEDURE — 1125F PR PAIN SEVERITY QUANTIFIED, PAIN PRESENT: ICD-10-PCS | Mod: S$GLB,,, | Performed by: CLINICAL NURSE SPECIALIST

## 2021-04-27 PROCEDURE — 99215 PR OFFICE/OUTPT VISIT, EST, LEVL V, 40-54 MIN: ICD-10-PCS | Mod: S$GLB,,, | Performed by: CLINICAL NURSE SPECIALIST

## 2021-04-27 PROCEDURE — 3008F PR BODY MASS INDEX (BMI) DOCUMENTED: ICD-10-PCS | Mod: CPTII,S$GLB,, | Performed by: CLINICAL NURSE SPECIALIST

## 2021-04-27 RX ORDER — GADOBUTROL 604.72 MG/ML
6 INJECTION INTRAVENOUS
Status: COMPLETED | OUTPATIENT
Start: 2021-04-27 | End: 2021-04-27

## 2021-04-27 RX ADMIN — GADOBUTROL 6 ML: 604.72 INJECTION INTRAVENOUS at 04:04

## 2021-04-28 ENCOUNTER — TELEPHONE (OUTPATIENT)
Dept: NEUROLOGY | Facility: CLINIC | Age: 26
End: 2021-04-28

## 2021-04-28 NOTE — TELEPHONE ENCOUNTER
----- Message from Elizabeth Morrison sent at 4/28/2021  4:19 PM CDT -----  Regarding: speak with nurse/test results  Contact: patient mother jose  [phone]    please call patient mother need test results from Mri also blood work waiting on a call back from the nurse thanks.

## 2021-04-29 ENCOUNTER — PATIENT MESSAGE (OUTPATIENT)
Dept: NEUROLOGY | Facility: CLINIC | Age: 26
End: 2021-04-29

## 2021-04-29 ENCOUNTER — TELEPHONE (OUTPATIENT)
Dept: NEUROLOGY | Facility: CLINIC | Age: 26
End: 2021-04-29

## 2021-04-29 DIAGNOSIS — G36.0 NEUROMYELITIS OPTICA: Primary | ICD-10-CM

## 2021-04-29 DIAGNOSIS — R63.0 POOR APPETITE: ICD-10-CM

## 2021-05-05 ENCOUNTER — TELEPHONE (OUTPATIENT)
Dept: NEUROLOGY | Facility: CLINIC | Age: 26
End: 2021-05-05

## 2021-05-05 NOTE — TELEPHONE ENCOUNTER
Hep B Surface Ag negative on 2/9/21 4/27/21   Hep B Surface Ab negative, Core Ab negative   Low IgM  WBC=7.34; KNA=1197    Ok to proceed with infusion

## 2021-05-07 ENCOUNTER — INFUSION (OUTPATIENT)
Dept: INFUSION THERAPY | Facility: HOSPITAL | Age: 26
End: 2021-05-07
Attending: PSYCHIATRY & NEUROLOGY
Payer: COMMERCIAL

## 2021-05-07 VITALS
DIASTOLIC BLOOD PRESSURE: 53 MMHG | TEMPERATURE: 98 F | HEART RATE: 83 BPM | OXYGEN SATURATION: 97 % | SYSTOLIC BLOOD PRESSURE: 92 MMHG | RESPIRATION RATE: 18 BRPM

## 2021-05-07 DIAGNOSIS — G36.0 NEUROMYELITIS OPTICA: Primary | ICD-10-CM

## 2021-05-07 PROCEDURE — 25000003 PHARM REV CODE 250: Performed by: PSYCHIATRY & NEUROLOGY

## 2021-05-07 PROCEDURE — 63600175 PHARM REV CODE 636 W HCPCS: Mod: JG | Performed by: PSYCHIATRY & NEUROLOGY

## 2021-05-07 PROCEDURE — 96413 CHEMO IV INFUSION 1 HR: CPT

## 2021-05-07 PROCEDURE — 96375 TX/PRO/DX INJ NEW DRUG ADDON: CPT

## 2021-05-07 PROCEDURE — 96367 TX/PROPH/DG ADDL SEQ IV INF: CPT

## 2021-05-07 PROCEDURE — 96415 CHEMO IV INFUSION ADDL HR: CPT

## 2021-05-07 RX ORDER — ACETAMINOPHEN 325 MG/1
650 TABLET ORAL
Status: CANCELLED | OUTPATIENT
Start: 2021-09-10

## 2021-05-07 RX ORDER — SODIUM CHLORIDE 0.9 % (FLUSH) 0.9 %
10 SYRINGE (ML) INJECTION
Status: CANCELLED | OUTPATIENT
Start: 2021-09-10

## 2021-05-07 RX ORDER — HEPARIN 100 UNIT/ML
500 SYRINGE INTRAVENOUS
Status: CANCELLED | OUTPATIENT
Start: 2021-09-10

## 2021-05-07 RX ORDER — FAMOTIDINE 10 MG/ML
20 INJECTION INTRAVENOUS
Status: CANCELLED | OUTPATIENT
Start: 2021-09-10

## 2021-05-07 RX ORDER — FAMOTIDINE 10 MG/ML
20 INJECTION INTRAVENOUS
Status: COMPLETED | OUTPATIENT
Start: 2021-05-07 | End: 2021-05-07

## 2021-05-07 RX ORDER — HEPARIN 100 UNIT/ML
500 SYRINGE INTRAVENOUS
Status: CANCELLED | OUTPATIENT
Start: 2021-05-07

## 2021-05-07 RX ORDER — ACETAMINOPHEN 325 MG/1
650 TABLET ORAL
Status: COMPLETED | OUTPATIENT
Start: 2021-05-07 | End: 2021-05-07

## 2021-05-07 RX ORDER — HEPARIN 100 UNIT/ML
500 SYRINGE INTRAVENOUS
Status: DISCONTINUED | OUTPATIENT
Start: 2021-05-07 | End: 2021-05-07 | Stop reason: HOSPADM

## 2021-05-07 RX ORDER — SODIUM CHLORIDE 0.9 % (FLUSH) 0.9 %
10 SYRINGE (ML) INJECTION
Status: CANCELLED | OUTPATIENT
Start: 2021-05-07

## 2021-05-07 RX ORDER — SODIUM CHLORIDE 0.9 % (FLUSH) 0.9 %
10 SYRINGE (ML) INJECTION
Status: DISCONTINUED | OUTPATIENT
Start: 2021-05-07 | End: 2021-05-07 | Stop reason: HOSPADM

## 2021-05-07 RX ADMIN — FAMOTIDINE 20 MG: 10 INJECTION INTRAVENOUS at 09:05

## 2021-05-07 RX ADMIN — DEXTROSE: 50 INJECTION, SOLUTION INTRAVENOUS at 09:05

## 2021-05-07 RX ADMIN — ACETAMINOPHEN 650 MG: 325 TABLET ORAL at 09:05

## 2021-05-07 RX ADMIN — RITUXIMAB 1000 MG: 10 INJECTION, SOLUTION INTRAVENOUS at 10:05

## 2021-05-07 RX ADMIN — DIPHENHYDRAMINE HYDROCHLORIDE 50 MG: 50 INJECTION INTRAMUSCULAR; INTRAVENOUS at 09:05

## 2021-05-10 ENCOUNTER — PATIENT MESSAGE (OUTPATIENT)
Dept: NEUROLOGY | Facility: CLINIC | Age: 26
End: 2021-05-10

## 2021-05-11 ENCOUNTER — PATIENT MESSAGE (OUTPATIENT)
Dept: NEUROLOGY | Facility: CLINIC | Age: 26
End: 2021-05-11

## 2021-05-11 DIAGNOSIS — F06.34 MOOD DISORDER WITH MIXED FEATURES DUE TO GENERAL MEDICAL CONDITION: Primary | ICD-10-CM

## 2021-05-13 ENCOUNTER — PATIENT MESSAGE (OUTPATIENT)
Dept: NEUROLOGY | Facility: CLINIC | Age: 26
End: 2021-05-13

## 2021-05-24 ENCOUNTER — TELEPHONE (OUTPATIENT)
Dept: NEUROLOGY | Facility: CLINIC | Age: 26
End: 2021-05-24

## 2021-05-24 NOTE — TELEPHONE ENCOUNTER
----- Message from Nitza Fuentes sent at 5/24/2021 11:40 AM CDT -----  Contact: Donna @[phone] or [phone]  Pt mother needs a call back regarding appts and blood work appts.

## 2021-05-24 NOTE — TELEPHONE ENCOUNTER
Pt went to therapy on Saturday. Pt has no ID. Pt's mom has call to pt's pcp to get lab work and refills on medication until she is seen by psychiatry.

## 2021-05-26 ENCOUNTER — PATIENT MESSAGE (OUTPATIENT)
Dept: NEUROLOGY | Facility: CLINIC | Age: 26
End: 2021-05-26

## 2021-05-26 DIAGNOSIS — F32.A DEPRESSION, UNSPECIFIED DEPRESSION TYPE: Primary | ICD-10-CM

## 2021-05-26 DIAGNOSIS — F41.9 ANXIETY: ICD-10-CM

## 2021-05-26 DIAGNOSIS — M79.2 NEUROPATHIC PAIN: ICD-10-CM

## 2021-05-31 ENCOUNTER — PATIENT MESSAGE (OUTPATIENT)
Dept: PSYCHIATRY | Facility: CLINIC | Age: 26
End: 2021-05-31

## 2021-05-31 RX ORDER — BENZTROPINE MESYLATE 1 MG/1
1 TABLET ORAL 2 TIMES DAILY
Qty: 60 TABLET | Refills: 1 | Status: SHIPPED | OUTPATIENT
Start: 2021-05-31 | End: 2021-08-05 | Stop reason: SDUPTHER

## 2021-05-31 RX ORDER — PROPRANOLOL HYDROCHLORIDE 10 MG/1
60 TABLET ORAL DAILY
Qty: 180 TABLET | Refills: 1 | Status: SHIPPED | OUTPATIENT
Start: 2021-05-31 | End: 2021-07-30 | Stop reason: SDUPTHER

## 2021-05-31 RX ORDER — HALOPERIDOL 5 MG/1
5 TABLET ORAL 2 TIMES DAILY
Qty: 60 TABLET | Refills: 1 | Status: SHIPPED | OUTPATIENT
Start: 2021-05-31 | End: 2021-07-30 | Stop reason: SDUPTHER

## 2021-05-31 RX ORDER — GABAPENTIN 100 MG/1
100 CAPSULE ORAL 2 TIMES DAILY
Qty: 60 CAPSULE | Refills: 5 | Status: SHIPPED | OUTPATIENT
Start: 2021-05-31 | End: 2021-07-30 | Stop reason: SDUPTHER

## 2021-05-31 RX ORDER — GABAPENTIN 300 MG/1
300 CAPSULE ORAL 2 TIMES DAILY
Qty: 60 CAPSULE | Refills: 5 | Status: SHIPPED | OUTPATIENT
Start: 2021-05-31 | End: 2021-07-30 | Stop reason: SDUPTHER

## 2021-05-31 RX ORDER — SERTRALINE HYDROCHLORIDE 50 MG/1
50 TABLET, FILM COATED ORAL DAILY
Qty: 30 TABLET | Refills: 1 | Status: SHIPPED | OUTPATIENT
Start: 2021-05-31 | End: 2021-07-30 | Stop reason: SDUPTHER

## 2021-06-01 ENCOUNTER — TELEPHONE (OUTPATIENT)
Dept: NEUROLOGY | Facility: CLINIC | Age: 26
End: 2021-06-01

## 2021-06-01 ENCOUNTER — PATIENT MESSAGE (OUTPATIENT)
Dept: NEUROLOGY | Facility: CLINIC | Age: 26
End: 2021-06-01

## 2021-06-08 ENCOUNTER — PATIENT MESSAGE (OUTPATIENT)
Dept: NEUROLOGY | Facility: CLINIC | Age: 26
End: 2021-06-08

## 2021-06-10 ENCOUNTER — PATIENT MESSAGE (OUTPATIENT)
Dept: NEUROLOGY | Facility: CLINIC | Age: 26
End: 2021-06-10

## 2021-06-16 ENCOUNTER — OFFICE VISIT (OUTPATIENT)
Dept: OPHTHALMOLOGY | Facility: CLINIC | Age: 26
End: 2021-06-16
Payer: COMMERCIAL

## 2021-06-16 DIAGNOSIS — G36.0 NEUROMYELITIS OPTICA: Primary | ICD-10-CM

## 2021-06-16 DIAGNOSIS — H53.15 VISUAL DISTORTIONS OF SHAPE AND SIZE: ICD-10-CM

## 2021-06-16 DIAGNOSIS — R42 VERTIGO: ICD-10-CM

## 2021-06-16 DIAGNOSIS — H52.7 REFRACTIVE ERROR: ICD-10-CM

## 2021-06-16 PROCEDURE — 92133 OCT, OPTIC NERVE - OU - BOTH EYES: ICD-10-PCS | Mod: S$GLB,,, | Performed by: STUDENT IN AN ORGANIZED HEALTH CARE EDUCATION/TRAINING PROGRAM

## 2021-06-16 PROCEDURE — 92133 CPTRZD OPH DX IMG PST SGM ON: CPT | Mod: S$GLB,,, | Performed by: STUDENT IN AN ORGANIZED HEALTH CARE EDUCATION/TRAINING PROGRAM

## 2021-06-16 PROCEDURE — 99205 OFFICE O/P NEW HI 60 MIN: CPT | Mod: S$GLB,,, | Performed by: STUDENT IN AN ORGANIZED HEALTH CARE EDUCATION/TRAINING PROGRAM

## 2021-06-16 PROCEDURE — 92083 HUMPHREY VISUAL FIELD - OU - BOTH EYES: ICD-10-PCS | Mod: S$GLB,,, | Performed by: STUDENT IN AN ORGANIZED HEALTH CARE EDUCATION/TRAINING PROGRAM

## 2021-06-16 PROCEDURE — 92083 EXTENDED VISUAL FIELD XM: CPT | Mod: S$GLB,,, | Performed by: STUDENT IN AN ORGANIZED HEALTH CARE EDUCATION/TRAINING PROGRAM

## 2021-06-16 PROCEDURE — 1126F PR PAIN SEVERITY QUANTIFIED, NO PAIN PRESENT: ICD-10-PCS | Mod: S$GLB,,, | Performed by: STUDENT IN AN ORGANIZED HEALTH CARE EDUCATION/TRAINING PROGRAM

## 2021-06-16 PROCEDURE — 99999 PR PBB SHADOW E&M-EST. PATIENT-LVL III: CPT | Mod: PBBFAC,,, | Performed by: STUDENT IN AN ORGANIZED HEALTH CARE EDUCATION/TRAINING PROGRAM

## 2021-06-16 PROCEDURE — 99205 PR OFFICE/OUTPT VISIT, NEW, LEVL V, 60-74 MIN: ICD-10-PCS | Mod: S$GLB,,, | Performed by: STUDENT IN AN ORGANIZED HEALTH CARE EDUCATION/TRAINING PROGRAM

## 2021-06-16 PROCEDURE — 99999 PR PBB SHADOW E&M-EST. PATIENT-LVL III: ICD-10-PCS | Mod: PBBFAC,,, | Performed by: STUDENT IN AN ORGANIZED HEALTH CARE EDUCATION/TRAINING PROGRAM

## 2021-06-16 PROCEDURE — 1126F AMNT PAIN NOTED NONE PRSNT: CPT | Mod: S$GLB,,, | Performed by: STUDENT IN AN ORGANIZED HEALTH CARE EDUCATION/TRAINING PROGRAM

## 2021-06-16 RX ORDER — MULTIVITAMIN
1 TABLET ORAL DAILY
COMMUNITY

## 2021-07-07 ENCOUNTER — OFFICE VISIT (OUTPATIENT)
Dept: NEUROLOGY | Facility: CLINIC | Age: 26
End: 2021-07-07
Payer: COMMERCIAL

## 2021-07-07 VITALS
WEIGHT: 123.44 LBS | SYSTOLIC BLOOD PRESSURE: 82 MMHG | DIASTOLIC BLOOD PRESSURE: 56 MMHG | HEIGHT: 67 IN | HEART RATE: 85 BPM | BODY MASS INDEX: 19.37 KG/M2

## 2021-07-07 DIAGNOSIS — F02.818 MAJOR NEUROCOGNITIVE DISORDER DUE TO ANOTHER MEDICAL CONDITION WITH BEHAVIORAL DISTURBANCE: ICD-10-CM

## 2021-07-07 DIAGNOSIS — D84.9 IMMUNOSUPPRESSION: ICD-10-CM

## 2021-07-07 DIAGNOSIS — F31.9 BIPOLAR 1 DISORDER: ICD-10-CM

## 2021-07-07 DIAGNOSIS — G35 MS (MULTIPLE SCLEROSIS): Primary | ICD-10-CM

## 2021-07-07 DIAGNOSIS — G36.0 NEUROMYELITIS OPTICA: ICD-10-CM

## 2021-07-07 PROCEDURE — 99499 UNLISTED E&M SERVICE: CPT | Mod: S$GLB,,, | Performed by: CLINICAL NEUROPSYCHOLOGIST

## 2021-07-07 PROCEDURE — 1126F AMNT PAIN NOTED NONE PRSNT: CPT | Mod: S$GLB,,, | Performed by: PSYCHIATRY & NEUROLOGY

## 2021-07-07 PROCEDURE — 1126F PR PAIN SEVERITY QUANTIFIED, NO PAIN PRESENT: ICD-10-PCS | Mod: S$GLB,,, | Performed by: PSYCHIATRY & NEUROLOGY

## 2021-07-07 PROCEDURE — 90791 PR PSYCHIATRIC DIAGNOSTIC EVALUATION: ICD-10-PCS | Mod: S$GLB,,, | Performed by: CLINICAL NEUROPSYCHOLOGIST

## 2021-07-07 PROCEDURE — 99999 PR PBB SHADOW E&M-EST. PATIENT-LVL III: ICD-10-PCS | Mod: PBBFAC,,, | Performed by: PSYCHIATRY & NEUROLOGY

## 2021-07-07 PROCEDURE — 99999 PR PBB SHADOW E&M-EST. PATIENT-LVL III: CPT | Mod: PBBFAC,,, | Performed by: PSYCHIATRY & NEUROLOGY

## 2021-07-07 PROCEDURE — 3008F PR BODY MASS INDEX (BMI) DOCUMENTED: ICD-10-PCS | Mod: CPTII,S$GLB,, | Performed by: PSYCHIATRY & NEUROLOGY

## 2021-07-07 PROCEDURE — 99499 NO LOS: ICD-10-PCS | Mod: S$GLB,,, | Performed by: CLINICAL NEUROPSYCHOLOGIST

## 2021-07-07 PROCEDURE — 99215 OFFICE O/P EST HI 40 MIN: CPT | Mod: S$GLB,,, | Performed by: PSYCHIATRY & NEUROLOGY

## 2021-07-07 PROCEDURE — 90791 PSYCH DIAGNOSTIC EVALUATION: CPT | Mod: S$GLB,,, | Performed by: CLINICAL NEUROPSYCHOLOGIST

## 2021-07-07 PROCEDURE — 99215 PR OFFICE/OUTPT VISIT, EST, LEVL V, 40-54 MIN: ICD-10-PCS | Mod: S$GLB,,, | Performed by: PSYCHIATRY & NEUROLOGY

## 2021-07-07 PROCEDURE — 3008F BODY MASS INDEX DOCD: CPT | Mod: CPTII,S$GLB,, | Performed by: PSYCHIATRY & NEUROLOGY

## 2021-07-13 PROBLEM — F02.818 MAJOR NEUROCOGNITIVE DISORDER DUE TO ANOTHER MEDICAL CONDITION WITH BEHAVIORAL DISTURBANCE: Status: ACTIVE | Noted: 2017-05-25

## 2021-07-19 ENCOUNTER — DOCUMENTATION ONLY (OUTPATIENT)
Dept: NEUROLOGY | Facility: CLINIC | Age: 26
End: 2021-07-19

## 2021-07-30 ENCOUNTER — OFFICE VISIT (OUTPATIENT)
Dept: PSYCHIATRY | Facility: CLINIC | Age: 26
End: 2021-07-30
Payer: COMMERCIAL

## 2021-07-30 VITALS
WEIGHT: 123.88 LBS | DIASTOLIC BLOOD PRESSURE: 55 MMHG | SYSTOLIC BLOOD PRESSURE: 87 MMHG | BODY MASS INDEX: 19.41 KG/M2 | HEART RATE: 80 BPM

## 2021-07-30 DIAGNOSIS — R45.87 POOR IMPULSE CONTROL: ICD-10-CM

## 2021-07-30 DIAGNOSIS — F12.90 MARIJUANA USE: ICD-10-CM

## 2021-07-30 DIAGNOSIS — F06.34 MOOD DISORDER WITH MIXED FEATURES DUE TO GENERAL MEDICAL CONDITION: Primary | ICD-10-CM

## 2021-07-30 PROCEDURE — 1160F PR REVIEW ALL MEDS BY PRESCRIBER/CLIN PHARMACIST DOCUMENTED: ICD-10-PCS | Mod: CPTII,S$GLB,, | Performed by: NURSE PRACTITIONER

## 2021-07-30 PROCEDURE — 3008F BODY MASS INDEX DOCD: CPT | Mod: CPTII,S$GLB,, | Performed by: NURSE PRACTITIONER

## 2021-07-30 PROCEDURE — 1160F RVW MEDS BY RX/DR IN RCRD: CPT | Mod: CPTII,S$GLB,, | Performed by: NURSE PRACTITIONER

## 2021-07-30 PROCEDURE — 99999 PR PBB SHADOW E&M-EST. PATIENT-LVL IV: CPT | Mod: PBBFAC,,, | Performed by: NURSE PRACTITIONER

## 2021-07-30 PROCEDURE — 3078F PR MOST RECENT DIASTOLIC BLOOD PRESSURE < 80 MM HG: ICD-10-PCS | Mod: CPTII,S$GLB,, | Performed by: NURSE PRACTITIONER

## 2021-07-30 PROCEDURE — 3074F PR MOST RECENT SYSTOLIC BLOOD PRESSURE < 130 MM HG: ICD-10-PCS | Mod: CPTII,S$GLB,, | Performed by: NURSE PRACTITIONER

## 2021-07-30 PROCEDURE — 3074F SYST BP LT 130 MM HG: CPT | Mod: CPTII,S$GLB,, | Performed by: NURSE PRACTITIONER

## 2021-07-30 PROCEDURE — 90792 PSYCH DIAG EVAL W/MED SRVCS: CPT | Mod: S$GLB,,, | Performed by: NURSE PRACTITIONER

## 2021-07-30 PROCEDURE — 3078F DIAST BP <80 MM HG: CPT | Mod: CPTII,S$GLB,, | Performed by: NURSE PRACTITIONER

## 2021-07-30 PROCEDURE — 1159F PR MEDICATION LIST DOCUMENTED IN MEDICAL RECORD: ICD-10-PCS | Mod: CPTII,S$GLB,, | Performed by: NURSE PRACTITIONER

## 2021-07-30 PROCEDURE — 90792 PR PSYCHIATRIC DIAGNOSTIC EVALUATION W/MEDICAL SERVICES: ICD-10-PCS | Mod: S$GLB,,, | Performed by: NURSE PRACTITIONER

## 2021-07-30 PROCEDURE — 1159F MED LIST DOCD IN RCRD: CPT | Mod: CPTII,S$GLB,, | Performed by: NURSE PRACTITIONER

## 2021-07-30 PROCEDURE — 99999 PR PBB SHADOW E&M-EST. PATIENT-LVL IV: ICD-10-PCS | Mod: PBBFAC,,, | Performed by: NURSE PRACTITIONER

## 2021-07-30 PROCEDURE — 3008F PR BODY MASS INDEX (BMI) DOCUMENTED: ICD-10-PCS | Mod: CPTII,S$GLB,, | Performed by: NURSE PRACTITIONER

## 2021-07-30 RX ORDER — PROPRANOLOL HYDROCHLORIDE 40 MG/1
40 TABLET ORAL EVERY MORNING
Qty: 90 TABLET | Refills: 0 | Status: SHIPPED | OUTPATIENT
Start: 2021-07-30 | End: 2021-11-03

## 2021-07-30 RX ORDER — GABAPENTIN 100 MG/1
100 CAPSULE ORAL 2 TIMES DAILY
Qty: 180 CAPSULE | Refills: 0 | Status: SHIPPED | OUTPATIENT
Start: 2021-07-30 | End: 2021-10-28

## 2021-07-30 RX ORDER — SERTRALINE HYDROCHLORIDE 50 MG/1
50 TABLET, FILM COATED ORAL DAILY
Qty: 90 TABLET | Refills: 0 | Status: SHIPPED | OUTPATIENT
Start: 2021-07-30 | End: 2021-11-03

## 2021-07-30 RX ORDER — HALOPERIDOL 5 MG/1
5 TABLET ORAL 2 TIMES DAILY
Qty: 180 TABLET | Refills: 0 | Status: SHIPPED | OUTPATIENT
Start: 2021-07-30 | End: 2021-11-03

## 2021-07-30 RX ORDER — GABAPENTIN 300 MG/1
300 CAPSULE ORAL 2 TIMES DAILY
Qty: 180 CAPSULE | Refills: 0 | Status: SHIPPED | OUTPATIENT
Start: 2021-07-30 | End: 2021-11-03

## 2021-07-30 RX ORDER — PROPRANOLOL HYDROCHLORIDE 20 MG/1
TABLET ORAL
Qty: 90 TABLET | Refills: 0 | Status: SHIPPED | OUTPATIENT
Start: 2021-07-30 | End: 2022-02-01 | Stop reason: SDUPTHER

## 2021-08-05 DIAGNOSIS — F32.A DEPRESSION, UNSPECIFIED DEPRESSION TYPE: ICD-10-CM

## 2021-08-05 DIAGNOSIS — F41.9 ANXIETY: ICD-10-CM

## 2021-08-05 RX ORDER — BENZTROPINE MESYLATE 1 MG/1
1 TABLET ORAL 2 TIMES DAILY
Qty: 60 TABLET | Refills: 2 | Status: SHIPPED | OUTPATIENT
Start: 2021-08-05 | End: 2021-11-12 | Stop reason: SDUPTHER

## 2021-09-01 ENCOUNTER — PATIENT MESSAGE (OUTPATIENT)
Dept: PSYCHIATRY | Facility: CLINIC | Age: 26
End: 2021-09-01

## 2021-09-02 ENCOUNTER — PATIENT MESSAGE (OUTPATIENT)
Dept: PSYCHIATRY | Facility: CLINIC | Age: 26
End: 2021-09-02

## 2021-09-04 ENCOUNTER — PATIENT MESSAGE (OUTPATIENT)
Dept: NEUROLOGY | Facility: CLINIC | Age: 26
End: 2021-09-04

## 2021-10-06 ENCOUNTER — LAB VISIT (OUTPATIENT)
Dept: LAB | Facility: HOSPITAL | Age: 26
End: 2021-10-06
Attending: PSYCHIATRY & NEUROLOGY
Payer: COMMERCIAL

## 2021-10-06 DIAGNOSIS — G36.0 NEUROMYELITIS OPTICA: ICD-10-CM

## 2021-10-06 LAB
BASOPHILS # BLD AUTO: 0.1 K/UL (ref 0–0.2)
BASOPHILS NFR BLD: 1.4 % (ref 0–1.9)
DIFFERENTIAL METHOD: ABNORMAL
EOSINOPHIL # BLD AUTO: 0.1 K/UL (ref 0–0.5)
EOSINOPHIL NFR BLD: 1.3 % (ref 0–8)
ERYTHROCYTE [DISTWIDTH] IN BLOOD BY AUTOMATED COUNT: 12.7 % (ref 11.5–14.5)
HCT VFR BLD AUTO: 35.5 % (ref 37–48.5)
HGB BLD-MCNC: 11.5 G/DL (ref 12–16)
IMM GRANULOCYTES # BLD AUTO: 0.1 K/UL (ref 0–0.04)
IMM GRANULOCYTES NFR BLD AUTO: 1.4 % (ref 0–0.5)
LYMPHOCYTES # BLD AUTO: 2.6 K/UL (ref 1–4.8)
LYMPHOCYTES NFR BLD: 37.5 % (ref 18–48)
MCH RBC QN AUTO: 28.8 PG (ref 27–31)
MCHC RBC AUTO-ENTMCNC: 32.4 G/DL (ref 32–36)
MCV RBC AUTO: 89 FL (ref 82–98)
MONOCYTES # BLD AUTO: 0.6 K/UL (ref 0.3–1)
MONOCYTES NFR BLD: 8.5 % (ref 4–15)
NEUTROPHILS # BLD AUTO: 3.5 K/UL (ref 1.8–7.7)
NEUTROPHILS NFR BLD: 49.9 % (ref 38–73)
NRBC BLD-RTO: 0 /100 WBC
PLATELET # BLD AUTO: 200 K/UL (ref 150–450)
PMV BLD AUTO: 11 FL (ref 9.2–12.9)
RBC # BLD AUTO: 3.99 M/UL (ref 4–5.4)
WBC # BLD AUTO: 6.94 K/UL (ref 3.9–12.7)

## 2021-10-06 PROCEDURE — 85025 COMPLETE CBC W/AUTO DIFF WBC: CPT | Performed by: PSYCHIATRY & NEUROLOGY

## 2021-10-06 PROCEDURE — 86706 HEP B SURFACE ANTIBODY: CPT | Performed by: PSYCHIATRY & NEUROLOGY

## 2021-10-06 PROCEDURE — 88185 FLOWCYTOMETRY/TC ADD-ON: CPT | Performed by: PSYCHIATRY & NEUROLOGY

## 2021-10-06 PROCEDURE — 36415 COLL VENOUS BLD VENIPUNCTURE: CPT | Mod: PO | Performed by: PSYCHIATRY & NEUROLOGY

## 2021-10-06 PROCEDURE — 87340 HEPATITIS B SURFACE AG IA: CPT | Performed by: PSYCHIATRY & NEUROLOGY

## 2021-10-06 PROCEDURE — 86704 HEP B CORE ANTIBODY TOTAL: CPT | Performed by: PSYCHIATRY & NEUROLOGY

## 2021-10-07 ENCOUNTER — TELEPHONE (OUTPATIENT)
Dept: NEUROLOGY | Facility: CLINIC | Age: 26
End: 2021-10-07

## 2021-10-07 NOTE — TELEPHONE ENCOUNTER
Routing History    From: Jannet Guadarrama MD On: 07/07/2021 11:45 AM  To: Chiara SAMANO Staff ([place])  Priority: Routine  Routing Comments:  On track for November infusion of Rituxan--labs planned in October. Please schedule infusion at new [facility] in [place]

## 2021-10-08 LAB
PATH REPORT.FINAL DX SPEC: NORMAL
RITUXAN SENSITIVITY (CD20): NORMAL

## 2021-10-21 ENCOUNTER — PATIENT MESSAGE (OUTPATIENT)
Dept: NEUROLOGY | Facility: CLINIC | Age: 26
End: 2021-10-21
Payer: MEDICARE

## 2021-10-29 ENCOUNTER — TELEPHONE (OUTPATIENT)
Dept: NEUROLOGY | Facility: CLINIC | Age: 26
End: 2021-10-29
Payer: MEDICARE

## 2021-10-29 ENCOUNTER — PATIENT MESSAGE (OUTPATIENT)
Dept: PSYCHIATRY | Facility: CLINIC | Age: 26
End: 2021-10-29
Payer: MEDICARE

## 2021-10-29 ENCOUNTER — PATIENT MESSAGE (OUTPATIENT)
Dept: NEUROLOGY | Facility: CLINIC | Age: 26
End: 2021-10-29
Payer: MEDICARE

## 2021-11-03 ENCOUNTER — PATIENT MESSAGE (OUTPATIENT)
Dept: PSYCHIATRY | Facility: CLINIC | Age: 26
End: 2021-11-03
Payer: MEDICARE

## 2021-11-12 ENCOUNTER — PATIENT MESSAGE (OUTPATIENT)
Dept: PSYCHIATRY | Facility: CLINIC | Age: 26
End: 2021-11-12
Payer: MEDICARE

## 2021-11-12 ENCOUNTER — OFFICE VISIT (OUTPATIENT)
Dept: PSYCHIATRY | Facility: CLINIC | Age: 26
End: 2021-11-12
Payer: COMMERCIAL

## 2021-11-12 DIAGNOSIS — F41.9 ANXIETY: ICD-10-CM

## 2021-11-12 DIAGNOSIS — R45.87 POOR IMPULSE CONTROL: ICD-10-CM

## 2021-11-12 DIAGNOSIS — F06.34 MOOD DISORDER WITH MIXED FEATURES DUE TO GENERAL MEDICAL CONDITION: Primary | ICD-10-CM

## 2021-11-12 DIAGNOSIS — F12.90 MARIJUANA USE: ICD-10-CM

## 2021-11-12 PROCEDURE — 1160F RVW MEDS BY RX/DR IN RCRD: CPT | Mod: CPTII,95,, | Performed by: NURSE PRACTITIONER

## 2021-11-12 PROCEDURE — 99214 PR OFFICE/OUTPT VISIT, EST, LEVL IV, 30-39 MIN: ICD-10-PCS | Mod: 95,,, | Performed by: NURSE PRACTITIONER

## 2021-11-12 PROCEDURE — 1160F PR REVIEW ALL MEDS BY PRESCRIBER/CLIN PHARMACIST DOCUMENTED: ICD-10-PCS | Mod: CPTII,95,, | Performed by: NURSE PRACTITIONER

## 2021-11-12 PROCEDURE — 1159F MED LIST DOCD IN RCRD: CPT | Mod: CPTII,95,, | Performed by: NURSE PRACTITIONER

## 2021-11-12 PROCEDURE — 1159F PR MEDICATION LIST DOCUMENTED IN MEDICAL RECORD: ICD-10-PCS | Mod: CPTII,95,, | Performed by: NURSE PRACTITIONER

## 2021-11-12 PROCEDURE — 99214 OFFICE O/P EST MOD 30 MIN: CPT | Mod: 95,,, | Performed by: NURSE PRACTITIONER

## 2021-11-12 RX ORDER — SERTRALINE HYDROCHLORIDE 50 MG/1
50 TABLET, FILM COATED ORAL DAILY
Qty: 30 TABLET | Refills: 3 | Status: SHIPPED | OUTPATIENT
Start: 2021-11-12 | End: 2022-03-23

## 2021-11-12 RX ORDER — BENZTROPINE MESYLATE 1 MG/1
1 TABLET ORAL 2 TIMES DAILY
Qty: 60 TABLET | Refills: 3 | Status: SHIPPED | OUTPATIENT
Start: 2021-11-12 | End: 2022-03-23

## 2021-11-12 RX ORDER — GABAPENTIN 100 MG/1
100 CAPSULE ORAL 2 TIMES DAILY
Qty: 60 CAPSULE | Refills: 3 | Status: SHIPPED | OUTPATIENT
Start: 2021-11-12 | End: 2022-03-23

## 2021-11-12 RX ORDER — HALOPERIDOL 5 MG/1
5 TABLET ORAL 2 TIMES DAILY
Qty: 60 TABLET | Refills: 3 | Status: SHIPPED | OUTPATIENT
Start: 2021-11-12 | End: 2022-03-23

## 2021-11-12 RX ORDER — GABAPENTIN 300 MG/1
CAPSULE ORAL
Qty: 60 CAPSULE | Refills: 3 | Status: SHIPPED | OUTPATIENT
Start: 2021-11-12 | End: 2022-03-23

## 2021-11-16 ENCOUNTER — INFUSION (OUTPATIENT)
Dept: INFUSION THERAPY | Facility: HOSPITAL | Age: 26
End: 2021-11-16
Attending: PSYCHIATRY & NEUROLOGY
Payer: COMMERCIAL

## 2021-11-16 VITALS
DIASTOLIC BLOOD PRESSURE: 66 MMHG | WEIGHT: 129.19 LBS | SYSTOLIC BLOOD PRESSURE: 100 MMHG | TEMPERATURE: 98 F | RESPIRATION RATE: 16 BRPM | BODY MASS INDEX: 20.28 KG/M2 | HEIGHT: 67 IN | HEART RATE: 71 BPM

## 2021-11-16 DIAGNOSIS — G36.0 NEUROMYELITIS OPTICA: Primary | ICD-10-CM

## 2021-11-16 PROCEDURE — 96413 CHEMO IV INFUSION 1 HR: CPT | Mod: PN

## 2021-11-16 PROCEDURE — 96375 TX/PRO/DX INJ NEW DRUG ADDON: CPT | Mod: PN

## 2021-11-16 PROCEDURE — 96367 TX/PROPH/DG ADDL SEQ IV INF: CPT | Mod: PN

## 2021-11-16 PROCEDURE — 63600175 PHARM REV CODE 636 W HCPCS: Mod: PN | Performed by: PSYCHIATRY & NEUROLOGY

## 2021-11-16 PROCEDURE — 25000003 PHARM REV CODE 250: Mod: PN | Performed by: PSYCHIATRY & NEUROLOGY

## 2021-11-16 PROCEDURE — 96415 CHEMO IV INFUSION ADDL HR: CPT | Mod: PN

## 2021-11-16 RX ORDER — FAMOTIDINE 10 MG/ML
20 INJECTION INTRAVENOUS
Status: COMPLETED | OUTPATIENT
Start: 2021-11-16 | End: 2021-11-16

## 2021-11-16 RX ORDER — SODIUM CHLORIDE 0.9 % (FLUSH) 0.9 %
10 SYRINGE (ML) INJECTION
Status: CANCELLED | OUTPATIENT
Start: 2022-04-05

## 2021-11-16 RX ORDER — HEPARIN 100 UNIT/ML
500 SYRINGE INTRAVENOUS
Status: CANCELLED | OUTPATIENT
Start: 2022-04-05

## 2021-11-16 RX ORDER — FAMOTIDINE 10 MG/ML
20 INJECTION INTRAVENOUS
Status: CANCELLED | OUTPATIENT
Start: 2022-04-05

## 2021-11-16 RX ORDER — SODIUM CHLORIDE 0.9 % (FLUSH) 0.9 %
10 SYRINGE (ML) INJECTION
Status: DISCONTINUED | OUTPATIENT
Start: 2021-11-16 | End: 2021-11-16 | Stop reason: HOSPADM

## 2021-11-16 RX ORDER — ACETAMINOPHEN 325 MG/1
650 TABLET ORAL
Status: CANCELLED | OUTPATIENT
Start: 2022-04-05

## 2021-11-16 RX ORDER — HEPARIN 100 UNIT/ML
500 SYRINGE INTRAVENOUS
Status: DISCONTINUED | OUTPATIENT
Start: 2021-11-16 | End: 2021-11-16 | Stop reason: HOSPADM

## 2021-11-16 RX ORDER — ACETAMINOPHEN 325 MG/1
650 TABLET ORAL
Status: COMPLETED | OUTPATIENT
Start: 2021-11-16 | End: 2021-11-16

## 2021-11-16 RX ADMIN — FAMOTIDINE 20 MG: 10 INJECTION INTRAVENOUS at 09:11

## 2021-11-16 RX ADMIN — ACETAMINOPHEN 650 MG: 325 TABLET, FILM COATED ORAL at 09:11

## 2021-11-16 RX ADMIN — RITUXIMAB 1000 MG: 10 INJECTION, SOLUTION INTRAVENOUS at 10:11

## 2021-11-16 RX ADMIN — DIPHENHYDRAMINE HYDROCHLORIDE 50 MG: 50 INJECTION INTRAMUSCULAR; INTRAVENOUS at 10:11

## 2021-11-16 RX ADMIN — DEXTROSE: 50 INJECTION, SOLUTION INTRAVENOUS at 09:11

## 2021-12-21 ENCOUNTER — PATIENT MESSAGE (OUTPATIENT)
Dept: NEUROLOGY | Facility: CLINIC | Age: 26
End: 2021-12-21
Payer: MEDICARE

## 2022-03-24 NOTE — PROGRESS NOTES
Subjective:       Patient ID: Nohemy Ladd is a 27 y.o. female who presents today for a routine virtual visit for NMO. The history has been provided by the patient. She was last seen in July 2021 by Dr. Guadarrama.     The patient location is: her car; [place]   The chief complaint leading to consultation is: NMO    Visit type: audiovisual    Face to Face time with patient: 30 minutes   40 minutes of total time spent on the encounter, which includes face to face time and non-face to face time preparing to see the patient (eg, review of tests), Obtaining and/or reviewing separately obtained history, Documenting clinical information in the electronic or other health record, Independently interpreting results (not separately reported) and communicating results to the patient/family/caregiver, or Care coordination (not separately reported).     Each patient to whom he or she provides medical services by telemedicine is:  (1) informed of the relationship between the physician and patient and the respective role of any other health care provider with respect to management of the patient; and (2) notified that he or she may decline to receive medical services by telemedicine and may withdraw from such care at any time.      NMO HPI:  · DMT: Rituxan last infused in November 2021; due next in May 2022   · Side effects from DMT? No  · Taking vitamin D3 as recommended? Yes - Dose:   · She has not seen psychiatry since November.   · She did not do repeat cognitive testing.   · She has a medicine dispenser and is taking her prescribed medications more regularly.   · Propranolol was lowered to 20mg in the morning and 10mg at night because her blood pressure still has been low. She has passed out with position changes.   · She continues to vomit frequently--sometimes 1-2 times, others 6- 7 times a day. She does not eat regularly. She only eats one time a day.   · She will be getting dentures soon. Her teeth are being pulled in  May.   · She is very worried that she is relapsing. She feels like her memory is getting worse. Her dad does not see any change. She gets distracted easily and loses her train of thought.    SOCIAL HISTORY  Social History     Tobacco Use    Smoking status: Current Every Day Smoker     Packs/day: 0.25     Types: Cigarettes    Smokeless tobacco: Never Used   Substance Use Topics    Alcohol use: Not Currently    Drug use: Yes     Types: Marijuana     Comment: last smoked marijuana last week     Living arrangements - the patient lives alone.  Employment:     NMO ROS:  · Fatigue: Yes  · Sleep Disturbance: Yes - She sleeps a lot.   · Bladder Dysfunction: Yes - Her urine has been very dark. She admits to not drinking enough water.   · Bowel Dysfunction: Yes -varies between constipation and diarrhea  · Spasticity: Yes -She feels tight in general.   · Visual Symptoms: Yes - She wears glasses now.   · Cognitive: Yes - as above  · Mood Disorder: Yes - She does not feel more depressed or anxious, but she gets lonely sometimes. She wishes she could get a dog.   · Gait Disturbance: No  · Falls: No  · Hand Dysfunction: No; she has some weakness in her hands though and she has trouble picking things up.   · Pain: Yes - She has back pain all the time.   · Sexual Dysfunction: Not Assessed  · Skin Breakdown: No  · Tremors: No  · Dysphagia:  No  · Dysarthria:  No  · Heat sensitivity:  Yes - She is cold all the time because she is so thin. She thinks she feels heat more intensely.   She does not pay out of pocket for Rituxan.         Objective:          Neurologic Exam      Deferred     Imaging:   No new imaging to review   Labs:     Lab Results   Component Value Date    [id number] 34 08/19/2020    [id number] 26 (L) 08/13/2019    [id number] 51 06/19/2018       Diagnosis/Assessment/Plan:    1. Neuromyelitis Optica  · Assessment: Nohemy continues to have bothersome neurological symptoms, with chronic vomiting, fatigue, and  cognitive impairment among those that impact her most.   · Imaging: None planned at this time.   · Disease Modifying Therapies: Continue Rituxan every 6 months. Her next infusion is due in May 2022. We will check CBC and CD20 next week, and CBC, Cd20 and hep B in April. She is aware of the risks associated with immunosuppressant therapy, including increased risk of infection.   ·     2. NMO Symptom Assessment / Management    · Bowel Dysfunction: Encouraged use of stool softener 3 times a week for bowel regularity.   · Cognitive: She is agreeable to completing NP testing now. Will discuss with Dr. Painting and staff.   · Mood Disorder: She continues to see psychiatry.   · Endurance/Strength: I have ordered home health physical therapy to help with overall stretching and strengthening. She is sedentary and not getting adequate nutrition, and she is becoming weaker, as a result.   · Vomiting: will discuss options with Dr. Guadarrama. She has already taken Zofran and phenergan with no relief.      I will see her back in 5-6 months.     Kasia Doll, AGCNS-BC, MSCN    Problem List Items Addressed This Visit        Neurologic Problems    Neuromyelitis optica - Primary    Relevant Orders    CBC auto differential    Rituxan Sensitivity    Hepatitis B Surface Antigen    Hepatitis B Surface Ab, Qualitative    Hepatitis B Core Antibody, Total    Immunoglobulins (IgG, IgA, IgM) Quantitative    Vitamin D    CBC auto differential    Rituxan Sensitivity    Ambulatory referral/consult to Home Health       Other    Chronic vomiting      Other Visit Diagnoses     Other long term (current) drug therapy         Relevant Orders    Vitamin D    Counseling regarding goals of care        Prophylactic immunotherapy        High risk medication use

## 2022-03-25 ENCOUNTER — OFFICE VISIT (OUTPATIENT)
Dept: PSYCHIATRY | Facility: CLINIC | Age: 27
End: 2022-03-25
Payer: COMMERCIAL

## 2022-03-25 ENCOUNTER — OFFICE VISIT (OUTPATIENT)
Dept: NEUROLOGY | Facility: CLINIC | Age: 27
End: 2022-03-25
Payer: COMMERCIAL

## 2022-03-25 VITALS
DIASTOLIC BLOOD PRESSURE: 68 MMHG | BODY MASS INDEX: 19.09 KG/M2 | SYSTOLIC BLOOD PRESSURE: 101 MMHG | WEIGHT: 121.94 LBS | HEART RATE: 97 BPM

## 2022-03-25 DIAGNOSIS — Z79.899 HIGH RISK MEDICATION USE: ICD-10-CM

## 2022-03-25 DIAGNOSIS — G36.0 NEUROMYELITIS OPTICA: Primary | ICD-10-CM

## 2022-03-25 DIAGNOSIS — Z29.89 PROPHYLACTIC IMMUNOTHERAPY: ICD-10-CM

## 2022-03-25 DIAGNOSIS — R45.87 POOR IMPULSE CONTROL: ICD-10-CM

## 2022-03-25 DIAGNOSIS — F12.90 MARIJUANA USE: ICD-10-CM

## 2022-03-25 DIAGNOSIS — R11.10 CHRONIC VOMITING: ICD-10-CM

## 2022-03-25 DIAGNOSIS — F41.9 ANXIETY: ICD-10-CM

## 2022-03-25 DIAGNOSIS — F06.34 MOOD DISORDER WITH MIXED FEATURES DUE TO GENERAL MEDICAL CONDITION: Primary | ICD-10-CM

## 2022-03-25 DIAGNOSIS — Z79.899 OTHER LONG TERM (CURRENT) DRUG THERAPY: ICD-10-CM

## 2022-03-25 DIAGNOSIS — Z71.89 COUNSELING REGARDING GOALS OF CARE: ICD-10-CM

## 2022-03-25 PROCEDURE — 99214 OFFICE O/P EST MOD 30 MIN: CPT | Mod: S$GLB,,, | Performed by: NURSE PRACTITIONER

## 2022-03-25 PROCEDURE — 99214 PR OFFICE/OUTPT VISIT, EST, LEVL IV, 30-39 MIN: ICD-10-PCS | Mod: S$GLB,,, | Performed by: NURSE PRACTITIONER

## 2022-03-25 PROCEDURE — 99215 OFFICE O/P EST HI 40 MIN: CPT | Mod: GT,,, | Performed by: CLINICAL NURSE SPECIALIST

## 2022-03-25 PROCEDURE — 90833 PSYTX W PT W E/M 30 MIN: CPT | Mod: S$GLB,,, | Performed by: NURSE PRACTITIONER

## 2022-03-25 PROCEDURE — 99999 PR PBB SHADOW E&M-EST. PATIENT-LVL III: ICD-10-PCS | Mod: PBBFAC,,, | Performed by: NURSE PRACTITIONER

## 2022-03-25 PROCEDURE — 99215 PR OFFICE/OUTPT VISIT, EST, LEVL V, 40-54 MIN: ICD-10-PCS | Mod: GT,,, | Performed by: CLINICAL NURSE SPECIALIST

## 2022-03-25 PROCEDURE — 99213 OFFICE O/P EST LOW 20 MIN: CPT | Mod: PBBFAC | Performed by: NURSE PRACTITIONER

## 2022-03-25 PROCEDURE — 90833 PR PSYCHOTHERAPY W/PATIENT W/E&M, 30 MIN (ADD ON): ICD-10-PCS | Mod: S$GLB,,, | Performed by: NURSE PRACTITIONER

## 2022-03-25 PROCEDURE — 99999 PR PBB SHADOW E&M-EST. PATIENT-LVL III: CPT | Mod: PBBFAC,,, | Performed by: NURSE PRACTITIONER

## 2022-03-25 RX ORDER — PROPRANOLOL HYDROCHLORIDE 20 MG/1
TABLET ORAL
Qty: 90 TABLET | Refills: 0 | Status: SHIPPED | OUTPATIENT
Start: 2022-03-25 | End: 2022-06-28

## 2022-03-25 RX ORDER — BENZTROPINE MESYLATE 1 MG/1
1 TABLET ORAL 2 TIMES DAILY
Qty: 180 TABLET | Refills: 0 | Status: SHIPPED | OUTPATIENT
Start: 2022-03-25 | End: 2022-08-04

## 2022-03-25 RX ORDER — PROPRANOLOL HYDROCHLORIDE 10 MG/1
10 TABLET ORAL 2 TIMES DAILY
Qty: 180 TABLET | Refills: 0 | Status: SHIPPED | OUTPATIENT
Start: 2022-03-25 | End: 2022-06-23

## 2022-03-25 RX ORDER — PROPRANOLOL HYDROCHLORIDE 40 MG/1
TABLET ORAL
Qty: 20 TABLET | Refills: 0 | Status: CANCELLED | OUTPATIENT
Start: 2022-03-25 | End: 2022-04-14

## 2022-03-25 RX ORDER — GABAPENTIN 300 MG/1
CAPSULE ORAL
Qty: 180 CAPSULE | Refills: 0 | Status: SHIPPED | OUTPATIENT
Start: 2022-03-25 | End: 2022-09-15

## 2022-03-25 RX ORDER — GABAPENTIN 100 MG/1
CAPSULE ORAL
Qty: 180 CAPSULE | Refills: 0 | Status: SHIPPED | OUTPATIENT
Start: 2022-03-25 | End: 2022-07-26

## 2022-03-25 RX ORDER — SERTRALINE HYDROCHLORIDE 50 MG/1
50 TABLET, FILM COATED ORAL DAILY
Qty: 90 TABLET | Refills: 0 | Status: SHIPPED | OUTPATIENT
Start: 2022-03-25 | End: 2022-08-02

## 2022-03-25 RX ORDER — HALOPERIDOL 5 MG/1
5 TABLET ORAL 2 TIMES DAILY
Qty: 180 TABLET | Refills: 0 | Status: SHIPPED | OUTPATIENT
Start: 2022-03-25 | End: 2022-08-04

## 2022-03-25 NOTE — PROGRESS NOTES
3/25/2022 2:38 PM  Nohemy Ladd  1995  [id number]    Outpatient Psychiatry Follow-Up Visit (MD/NP)       Chief Complaint:  Nohemy Ladd, a 27 y.o. female,who presents today for follow up of mood swings, impulsivity, anxiety and substance abuse.  Met with patient and father.        Interval History/Subjective Report/Content of Current Session:     Pt is a 27 y.o. female with a hx of neuromyelitis optica, pseudobulbar affect, and substance induced bipolar sxs.    The pt is here today with her father. Pt and her father report that the pt has not been agitated and has not been displaying any manic symptoms. She has not been displaying impulsivity. However, he notes that if she misses a dose of her medications, she becomes very irritable. The pt states that her mood is good. She sleeps a lot. She still lives alone in an RV and reports that she is often bored. Appetite is still decreased. Eats 1-2 meals/day. Taking in fluids but has been vomiting a lot. Pt states she was told that she vomits due to the location of her brain lesions, but her father states he is not sure about this. She has an appt with her neurology provider today, and I encouraged the pt to discuss this with her. She states she has been drinking sports drinks to help replenish her fluids. Denies constitutional sxs and other GI sxs.    The pt's father states the pt was forgetting to take doses of her meds. He has since purchased a machine that dispenses the meds. The machine alarms when it is time for her to take her meds, and he fills the machine regularly.     The pt is not established with a PCP and I urged her to schedule an appt with a PCP.     The pt reports that she smokes marijuana multiple times a day. She states that she smokes it to relax her and to help with appetite. We discussed an IOP for treatment for her marijuana use as the use of the drug is what often precipitates her manic, impulsive bxs in the past. I provided the pt and  her father with verbal and written info on the IOP here at [facility] and at [facility].    Pt did not complete the fasting labs that were ordered at the last visit.    Pt denies recurrent thoughts of death and denies SI/HI. Denies any sxs of ksenia. Denies AVH, paranoia and delusions. No objective s/sx of psychosis or ksenia. Denies any ASE from her Haldol, gabapentin, Zoloft, and Cogentin.     Pt continues to report dizziness. Has had some episodes of syncope. Her father reports she is taking 40 mg of propranolol q am and 10 mg q hs. BP is low today - 101/68 with a pulse of 97. Her father reports they check it via machine at home, and it runs as low as 80/40. Her father states that propranolol is the only medication that has been effective in controlling her impulsive bxs. He also states that she will immediately go from a lying to a standing position. Will decrease her am dose of the medication and again discussed orthostatic hypotension precautions.     Also discussed with pt and her father the need for her to have previously ordered fasting labs drawn. She already ate and drank a soda today, so they will make an appt to have the labs drawn at the [place] location. Instructed the pt and her father that the pt should be fasting for at least 10 hours prior to the lab draw.        Psychotherapy:  · Target symptoms: mood disorder, agitation and irritability  · Why chosen therapy is appropriate versus another modality: relevant to diagnosis, patient responds to this modality  · Outcome monitoring methods: self-report, observation, feedback from family  · Therapeutic intervention type: supportive psychotherapy  · Topics discussed/themes: stress related to medical comorbidities, building skills sets for symptom management, substance abuse  · The patient's response to the intervention is accepting. The patient's progress toward treatment goals is limited.   · Duration of intervention: 20 minutes.      Psychotropic medication  review  Previous Trials-  Prozac  Abilify   Lithium  Elavil  Hydroxyzine     Current meds-  Haldol  Zoloft  Cogentin  Propranolol  Gabapentin          Review of Systems       Review of Systems   Constitutional: Negative for chills, fever and malaise/fatigue.   Respiratory: Negative for cough and shortness of breath.    Cardiovascular: Negative for chest pain and palpitations.   Gastrointestinal: Positive for nausea. Negative for abdominal pain and diarrhea.   Musculoskeletal: Negative for falls and myalgias.   Skin: Negative for rash.   Neurological: Negative for tremors, seizures and headaches.   Psychiatric/Behavioral:        See HPI         Past Medical, Family and Social History: The patient's past medical, family and social history, allergies, current medications, past surgical history, and problem list have been reviewed and updated as appropriate within the electronic medical record.    Compliance: see above    Side effects: see above    Risk Parameters:  Patient reports no suicidal ideation  Patient reports no homicidal ideation  Patient reports no self-injurious behavior  Patient reports no violent behavior    Exam (detailed: at least 9 elements; comprehensive: all 15 elements)   Constitutional  Vitals:  Most recent vital signs, dated greater than 90 days prior to this appointment, were reviewed.   Vitals:    03/25/22 1136   BP: 101/68   Pulse: 97   Weight: 55.3 kg (121 lb 14.6 oz)        General:  unremarkable, age appropriate, well nourished, casually dressed, neatly groomed, thin     Musculoskeletal  Muscle Strength/Tone:  no dyskinesia, no dystonia, no tremor, no tic   Gait & Station:  non-ataxic     Psychiatric      Appearance:  unremarkable, age appropriate, well nourished, casually dressed, neatly groomed, thin   Behavior:  normal, friendly and cooperative, eye contact normal     Speech:  no latency; no press   Mood & Affect:  euthymic  congruent and appropriate   Thought Process:  normal and logical    Associations:  intact   Thought Content:  normal, no suicidality, no homicidality, delusions, or paranoia   Insight:  intact, poor awareness of illness   Judgement: behavior is adequate to circumstances   Orientation:  grossly intact   Memory: impaired due to problems with memory   Language: grossly intact   Attention Span & Concentration:  able to focus   Fund of Knowledge:  intact and appropriate to age and level of education     Medications:  Outpatient Encounter Medications as of 3/25/2022   Medication Sig Dispense Refill    benztropine (COGENTIN) 1 MG tablet TAKE 1 TABLET BY MOUTH TWICE DAILY 60 tablet 0    cholecalciferol, vitamin D3, (VITAMIN D3) 25 mcg (1,000 unit) capsule Take 1,000 Units by mouth once daily.      cyanocobalamin (VITAMIN B-12) 1000 MCG tablet Take 100 mcg by mouth once daily.      docusate sodium (COLACE) 100 MG capsule Take 10 mg by mouth 2 (two) times daily.       gabapentin (NEURONTIN) 100 MG capsule TAKE 1 CAPSULE BY MOUTH TWICE DAILY, ALONG WITH  MG TWICE DAILY, FOR A TOTAL  MG DAILY 60 capsule 0    gabapentin (NEURONTIN) 300 MG capsule TAKE 1 CAPSULE BY MOUTH TWICE DAILY, TAKE WITH  MG CAPSULE TWICE DAILY. FOR A TOTAL  MG DAILY 60 capsule 0    haloperidoL (HALDOL) 5 MG tablet TAKE 1 TABLET BY MOUTH TWICE DAILY 60 tablet 0    multivitamin (ONE DAILY MULTIVITAMIN) per tablet Take 1 tablet by mouth once daily.      omeprazole (PRILOSEC) 40 MG capsule Take 20 mg by mouth once daily.      propranoloL (INDERAL) 20 MG tablet TAKE 1 TABLET BY MOUTH EVERY AFTERNOON *PATIENT NEEDS TO BE SEEN FOR FURTHER REFILLS* 20 tablet 0    propranoloL (INDERAL) 40 MG tablet TAKE 1 TABLET BY MOUTH EVERY MORNING. *PATIENT NEES TO BE SEEN FOR FURTHER REFILLS* 20 tablet 0    sertraline (ZOLOFT) 50 MG tablet TAKE 1 TABLET BY MOUTH DAILY 30 tablet 0     No facility-administered encounter medications on file as of 3/25/2022.       Allergy:  Review of patient's allergies  indicates:   Allergen Reactions    Hydromorphone Shortness Of Breath    Pcn [penicillins] Hives         Assessment and Diagnosis   Status/Progress: Based on the examination today, the patient's problem(s) is/are adequately controlled.  New problems have not been presented today.   Co-morbidities are complicating management of the primary condition.         General Impression:       ICD-10-CM ICD-9-CM   1. 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 Anxiety  F41.9 300.00   3. Marijuana use  F12.90 305.20   4. Poor impulse control  R45.87 312.30       Intervention/Counseling/Treatment Plan     · Medication Management:  · Continue Haldol 5 mg BID  · Continue Zoloft 50 mg q day  · Continue Gabapentin 400 mg BID (takes a 300 mg cap with a 100 mg cap BID)  · Continue Cogentin 1 mg BID  · Decrease propranolol to 30 mg q am and 10 mg q hs  · Referred pt to an IOP for her marijuana use. She was given [facility] .  · Will consider adding Risperdal in the future if needed.  · Labs: most recent labs reviewed; reminded pt to complete the previously order fasting CMP, TSH, lipid panel and A1c. I reminded the pt and her father that the pt should be fasting for at least 10 hours prior to having her labs drawn. They both verbalized understanding.  I discussed with the patient the risks of extrapyramidal side effects (dystonia, akathisia, parkinsonism), metabolic syndrome (inlcuding hyperglycemia and hyperlipidemia), Neuroleptic Malignant Syndrome (fever, muscle rigidity, autonomic instability), orthostatic hypotension, and tardive dyskinesia with antipsychotic use. Pt was told to report these symptoms right away. Pt was notified that it is possible for these movements to become permanent. The risks and benefits of medication were discussed with the patient. The patient expresses understanding of the above and displays the capacity to agree with this  treatment given said understanding. Patient also agrees that, currently, the benefits outweigh the risks and would like to pursue treatment at this time.   The pt was told that the antidepressant medication must be taken every day in order to see any improvement in symptoms. Pt was told that it takes 4-6 weeks to see the full therapeutic effect of the medication. Was told that the effects of the medication are very individualized and possible SE of the medication were discussed. Counseled on FDA black box warning of increased suicidality and contingency plans if such occurs. The patient expresses understanding of the former and displays the capacity to agree with the current plan.   Pt was encouraged not to use marijuana and was educated on the potential consequences of marijuana use. Pt was educated on the following: the most commonly reported side effects of using marijuana are intense anxiety and panic attacks and that the drug can actually increase symptoms of depression or promote the development of this disorder; the drug appears to induce manic episodes and increases rapid cycling between manic and depressive moods; marijuana exacerbates psychotic symptoms and worsens outcomes in patients already diagnosed with schizophrenia or other psychotic disorders; and that studies suggest that long-term use of marijuana may cause lasting impairments in executive function.   · Encouraged pt to eat a healthful diet and to get regular physical exercise (20-30 minutes/day).  · The treatment plan and follow up plan were reviewed with the patient.  · Discussed with patient informed consent, risks vs. benefits, alternative treatments, side effect profile and the inherent unpredictability of individual responses to these treatments. The patient expresses understanding of the above and displays the capacity to agree with this current plan and had no other questions.  · Encouraged Patient to keep future appointments.   · Take  medications as prescribed and abstain from substance abuse.   · Pt was told to present to ED or call 911 for SI/HI plan or intent, psychosis, or other psychiatric or medical emergency, and pt verbalized understanding.        Return to Clinic: 2 months, or sooner if needed        Face-to-face time with patient:  36 minutes  Total time:  55 minutes of total time spent on the encounter, which includes face to face time and non-face to face time preparing to see the patient (eg, review of tests), Obtaining and/or reviewing separately obtained history, Documenting clinical information in the electronic or other health record, Independently interpreting results (not separately reported) and communicating results to the patient/family/caregiver, or Care coordination (not separately reported).       Gerda Dinero, MSN, APRN, PMHNP-BC  [facility]

## 2022-03-25 NOTE — Clinical Note
Hi, Nhoemy Maher said that she is ready to complete the NP testing. Is it possible to get her rescheduled with you?   Kasia

## 2022-03-25 NOTE — Clinical Note
SP, she is due for her infusion in mid May. She is having labs next week and again in mid to late April. Any day Tues  through Thurs is ok for her infusion in May.

## 2022-03-25 NOTE — Clinical Note
UR, please schedule CBC, rituxan sensitivity next week in [place]; then CBC, rituxan sensitivity, hepatitis B, Vitamin D, and immunoglobulins in mid to late April in [place]. F/U with me in 5-6 months

## 2022-03-28 ENCOUNTER — PATIENT MESSAGE (OUTPATIENT)
Dept: NEUROLOGY | Facility: CLINIC | Age: 27
End: 2022-03-28
Payer: COMMERCIAL

## 2022-03-28 NOTE — PROGRESS NOTES
Spoke with patient father and scheduled first set of labs 3/30 at [facility] second set of labs 4/13 at [facility] and 5 mo follow up with Kasia virtually 8/29

## 2022-03-29 ENCOUNTER — TELEPHONE (OUTPATIENT)
Dept: PSYCHIATRY | Facility: CLINIC | Age: 27
End: 2022-03-29
Payer: COMMERCIAL

## 2022-03-29 ENCOUNTER — PATIENT MESSAGE (OUTPATIENT)
Dept: NEUROLOGY | Facility: CLINIC | Age: 27
End: 2022-03-29
Payer: COMMERCIAL

## 2022-03-29 ENCOUNTER — DOCUMENTATION ONLY (OUTPATIENT)
Dept: NEUROLOGY | Facility: CLINIC | Age: 27
End: 2022-03-29
Payer: COMMERCIAL

## 2022-03-29 NOTE — TELEPHONE ENCOUNTER
----- Message from Allison Brock sent at 3/29/2022 10:45 AM CDT -----  Regarding: Medication  [facility] is calling regarding two prescriptions sent in for Propranolol.  One is 20 mgs once daily and one for 10 mg twice daily.  She is requesting clarification on which you want filled for the pt.    She can be reached at [phone].    Thank you.

## 2022-03-31 ENCOUNTER — PATIENT MESSAGE (OUTPATIENT)
Dept: PSYCHIATRY | Facility: CLINIC | Age: 27
End: 2022-03-31
Payer: COMMERCIAL

## 2022-04-01 ENCOUNTER — PATIENT MESSAGE (OUTPATIENT)
Dept: NEUROLOGY | Facility: CLINIC | Age: 27
End: 2022-04-01
Payer: COMMERCIAL

## 2022-04-13 ENCOUNTER — LAB VISIT (OUTPATIENT)
Dept: LAB | Facility: HOSPITAL | Age: 27
End: 2022-04-13
Attending: CLINICAL NURSE SPECIALIST
Payer: COMMERCIAL

## 2022-04-13 DIAGNOSIS — G36.0 NEUROMYELITIS OPTICA: ICD-10-CM

## 2022-04-13 DIAGNOSIS — Z79.899 OTHER LONG TERM (CURRENT) DRUG THERAPY: ICD-10-CM

## 2022-04-13 LAB
25(OH)D3+25(OH)D2 SERPL-MCNC: 43 NG/ML (ref 30–96)
BASOPHILS # BLD AUTO: 0.08 K/UL (ref 0–0.2)
BASOPHILS NFR BLD: 1.1 % (ref 0–1.9)
DIFFERENTIAL METHOD: ABNORMAL
EOSINOPHIL # BLD AUTO: 0 K/UL (ref 0–0.5)
EOSINOPHIL NFR BLD: 0.5 % (ref 0–8)
ERYTHROCYTE [DISTWIDTH] IN BLOOD BY AUTOMATED COUNT: 12 % (ref 11.5–14.5)
HCT VFR BLD AUTO: 38.1 % (ref 37–48.5)
HGB BLD-MCNC: 12.4 G/DL (ref 12–16)
IGA SERPL-MCNC: 75 MG/DL (ref 40–350)
IGG SERPL-MCNC: 672 MG/DL (ref 650–1600)
IGM SERPL-MCNC: 10 MG/DL (ref 50–300)
IMM GRANULOCYTES # BLD AUTO: 0.05 K/UL (ref 0–0.04)
IMM GRANULOCYTES NFR BLD AUTO: 0.7 % (ref 0–0.5)
LYMPHOCYTES # BLD AUTO: 2.2 K/UL (ref 1–4.8)
LYMPHOCYTES NFR BLD: 30.2 % (ref 18–48)
MCH RBC QN AUTO: 28.5 PG (ref 27–31)
MCHC RBC AUTO-ENTMCNC: 32.5 G/DL (ref 32–36)
MCV RBC AUTO: 88 FL (ref 82–98)
MONOCYTES # BLD AUTO: 0.7 K/UL (ref 0.3–1)
MONOCYTES NFR BLD: 9.1 % (ref 4–15)
NEUTROPHILS # BLD AUTO: 4.3 K/UL (ref 1.8–7.7)
NEUTROPHILS NFR BLD: 58.4 % (ref 38–73)
NRBC BLD-RTO: 0 /100 WBC
PLATELET # BLD AUTO: 277 K/UL (ref 150–450)
PMV BLD AUTO: 10.6 FL (ref 9.2–12.9)
RBC # BLD AUTO: 4.35 M/UL (ref 4–5.4)
WBC # BLD AUTO: 7.29 K/UL (ref 3.9–12.7)

## 2022-04-13 PROCEDURE — 82306 VITAMIN D 25 HYDROXY: CPT | Performed by: CLINICAL NURSE SPECIALIST

## 2022-04-13 PROCEDURE — 86706 HEP B SURFACE ANTIBODY: CPT | Performed by: CLINICAL NURSE SPECIALIST

## 2022-04-13 PROCEDURE — 85025 COMPLETE CBC W/AUTO DIFF WBC: CPT | Performed by: CLINICAL NURSE SPECIALIST

## 2022-04-13 PROCEDURE — 87340 HEPATITIS B SURFACE AG IA: CPT | Performed by: CLINICAL NURSE SPECIALIST

## 2022-04-13 PROCEDURE — 82784 ASSAY IGA/IGD/IGG/IGM EACH: CPT | Performed by: CLINICAL NURSE SPECIALIST

## 2022-04-13 PROCEDURE — 86704 HEP B CORE ANTIBODY TOTAL: CPT | Performed by: CLINICAL NURSE SPECIALIST

## 2022-04-16 LAB
PATH REPORT.FINAL DX SPEC: NORMAL
RITUXAN SENSITIVITY (CD20): NORMAL

## 2022-04-19 ENCOUNTER — PATIENT MESSAGE (OUTPATIENT)
Dept: NEUROLOGY | Facility: CLINIC | Age: 27
End: 2022-04-19
Payer: COMMERCIAL

## 2022-04-20 ENCOUNTER — TELEPHONE (OUTPATIENT)
Dept: NEUROLOGY | Facility: CLINIC | Age: 27
End: 2022-04-20
Payer: COMMERCIAL

## 2022-04-22 NOTE — TELEPHONE ENCOUNTER
Low IgM  Negative hep B labs  B cells are 0  Normal WBC and ALC    Ok to proceed with scheduling Rituxan infusion.

## 2022-04-27 ENCOUNTER — OFFICE VISIT (OUTPATIENT)
Dept: PSYCHIATRY | Facility: CLINIC | Age: 27
End: 2022-04-27
Payer: MEDICARE

## 2022-04-27 VITALS
BODY MASS INDEX: 18.68 KG/M2 | SYSTOLIC BLOOD PRESSURE: 103 MMHG | HEART RATE: 95 BPM | WEIGHT: 119.25 LBS | DIASTOLIC BLOOD PRESSURE: 71 MMHG

## 2022-04-27 DIAGNOSIS — F12.90 MARIJUANA USE: ICD-10-CM

## 2022-04-27 DIAGNOSIS — F41.9 ANXIETY: ICD-10-CM

## 2022-04-27 DIAGNOSIS — F06.34 MOOD DISORDER WITH MIXED FEATURES DUE TO GENERAL MEDICAL CONDITION: Primary | ICD-10-CM

## 2022-04-27 DIAGNOSIS — R45.87 POOR IMPULSE CONTROL: ICD-10-CM

## 2022-04-27 PROCEDURE — 99214 OFFICE O/P EST MOD 30 MIN: CPT | Mod: S$PBB,,, | Performed by: NURSE PRACTITIONER

## 2022-04-27 PROCEDURE — 90833 PSYTX W PT W E/M 30 MIN: CPT | Mod: S$PBB,,, | Performed by: NURSE PRACTITIONER

## 2022-04-27 PROCEDURE — 99214 PR OFFICE/OUTPT VISIT, EST, LEVL IV, 30-39 MIN: ICD-10-PCS | Mod: S$PBB,,, | Performed by: NURSE PRACTITIONER

## 2022-04-27 PROCEDURE — 90833 PR PSYCHOTHERAPY W/PATIENT W/E&M, 30 MIN (ADD ON): ICD-10-PCS | Mod: S$PBB,,, | Performed by: NURSE PRACTITIONER

## 2022-04-27 PROCEDURE — 99213 OFFICE O/P EST LOW 20 MIN: CPT | Mod: PBBFAC | Performed by: NURSE PRACTITIONER

## 2022-04-27 PROCEDURE — 99999 PR PBB SHADOW E&M-EST. PATIENT-LVL III: ICD-10-PCS | Mod: PBBFAC,,, | Performed by: NURSE PRACTITIONER

## 2022-04-27 PROCEDURE — 99999 PR PBB SHADOW E&M-EST. PATIENT-LVL III: CPT | Mod: PBBFAC,,, | Performed by: NURSE PRACTITIONER

## 2022-04-27 NOTE — PROGRESS NOTES
4/27/2022 2:38 PM  Nohemy Ladd  1995  [id number]    Outpatient Psychiatry Follow-Up Visit (MD/NP)       Chief Complaint:  Nohemy Ladd, a 27 y.o. female,who presents today for follow up of mood swings, impulsivity, anxiety and substance abuse.  Met with patient and father.        Interval History/Subjective Report/Content of Current Session:     Pt is a 27 y.o. female with a hx of neuromyelitis optica, pseudobulbar affect, and substance induced bipolar sxs.    The pt is here today with her father. Pt and her father report that the pt has not been agitated and has not been displaying any manic symptoms. She has not been displaying impulsivity. 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 Endorses low mood. Discussed individual therapy. Her father reports that she was seeing an individual therapist but the pt cursed out the therapist and abruptly left the office. She has not been back since then.     He reports that her neurology NP ordered home PT 2/2 muscle atrophy in her arms. He notes that she is still not eating very much and continues to vomit. They deny that she induces vomiting. States she has seen a GI specialist in the past few years and was told there is nothing physically wrong with her. States she had a colonoscopy and an upper GI.  She states she has been drinking sports drinks to help replenish her fluids. Denies constitutional sxs and other GI sxs. We discussed the need for more intensive treatment. Her BMI today is 18.86. Her recent CMP, CBC, lipid panel, and TSH were unremarkable. Her father states that she is afraid to gain weight, although she denies this. She does mention to me that she gained a significant amount of weight when she was on steroids and she is afraid to gain the weight back. Discussed my concerns about her wt with the pt and referred the pt to the Eating Disorders program at [facility] [place] [facility]. Her father states he is going to look to see if there is anything closer to  them as they live in [place], [place]. He reports that her neurology NP offered to prescribe something to help increase her appetite, but the pt declined.    The pt's father states the pt was forgetting to take doses of her meds. He has since purchased a machine that dispenses the meds. The machine alarms when it is time for her to take her meds, and he fills the machine regularly.     The pt reports that she smokes marijuana multiple times a day. She states that she smokes it to relax her and to help with appetite. She smokes multiple times a day.    Pt denies recurrent thoughts of death and denies SI/HI. Denies any sxs of ksenia. Denies AVH, paranoia and delusions. No objective s/sx of psychosis or ksenia. Denies any ASE from her Haldol, gabapentin, Zoloft, and Cogentin.     Pt continues to report vertigo but denies any further syncopal epsiodes. Her prorpanolol was decreased at the last visit to a total of 40 mg q day. Her father states that propranolol is the only medication that has been effective in controlling her impulsive bxs.     I ordered [employer] genetic testing at the last visit. The pt had the testing done, but her father states the company kept calling him and telling him that he needs to pay $400.00 before they will provide the results and before they will submit the claim to his insurance company. As a result, the results are not available.      Psychotherapy:  · Target symptoms: mood disorder, agitation and irritability  · Why chosen therapy is appropriate versus another modality: relevant to diagnosis, patient responds to this modality  · Outcome monitoring methods: self-report, observation, feedback from family  · Therapeutic intervention type: supportive psychotherapy  · Topics discussed/themes: stress related to medical comorbidities, building skills sets for symptom management, substance abuse  · The patient's response to the intervention is accepting. The patient's progress toward treatment goals is  limited.   · Duration of intervention: 25 minutes.      Psychotropic medication review  Previous Trials-  Prozac  Abilify   Lithium  Elavil  Hydroxyzine     Current meds-  Haldol  Zoloft  Cogentin  Propranolol  Gabapentin          Review of Systems       Review of Systems   Constitutional: Negative for chills, fever and malaise/fatigue.   Respiratory: Negative for cough and shortness of breath.    Cardiovascular: Negative for chest pain and palpitations.   Gastrointestinal: Positive for nausea. Negative for abdominal pain and diarrhea.   Musculoskeletal: Negative for falls and myalgias.   Skin: Negative for rash.   Neurological: Negative for tremors, seizures and headaches.   Psychiatric/Behavioral:        See HPI         Past Medical, Family and Social History: The patient's past medical, family and social history, allergies, current medications, past surgical history, and problem list have been reviewed and updated as appropriate within the electronic medical record.    Compliance: yes    Side effects: see above    Risk Parameters:  Patient reports no suicidal ideation  Patient reports no homicidal ideation  Patient reports no self-injurious behavior  Patient reports no violent behavior    Exam (detailed: at least 9 elements; comprehensive: all 15 elements)   Constitutional  Vitals:  Most recent vital signs, dated greater than 90 days prior to this appointment, were reviewed.   There were no vitals filed for this visit.     General:  unremarkable, age appropriate, well nourished, casually dressed, neatly groomed, thin     Musculoskeletal  Muscle Strength/Tone:  no dyskinesia, no dystonia, no tremor, no tic   Gait & Station:  non-ataxic     Psychiatric      Appearance:  unremarkable, age appropriate, well nourished, casually dressed, neatly groomed, thin   Behavior:  normal, friendly and cooperative, eye contact normal     Speech:  no latency; no press   Mood & Affect:  euthymic  congruent and appropriate   Thought  Process:  normal and logical   Associations:  intact   Thought Content:  normal, no suicidality, no homicidality, delusions, or paranoia   Insight:  intact, poor awareness of illness   Judgement: behavior is adequate to circumstances   Orientation:  grossly intact   Memory: impaired due to problems with memory   Language: grossly intact   Attention Span & Concentration:  able to focus   Fund of Knowledge:  intact and appropriate to age and level of education     Medications:  Outpatient Encounter Medications as of 4/27/2022   Medication Sig Dispense Refill    benztropine (COGENTIN) 1 MG tablet Take 1 tablet (1 mg total) by mouth 2 (two) times daily. 180 tablet 0    cholecalciferol, vitamin D3, (VITAMIN D3) 25 mcg (1,000 unit) capsule Take 1,000 Units by mouth once daily.      cyanocobalamin (VITAMIN B-12) 1000 MCG tablet Take 100 mcg by mouth once daily.      docusate sodium (COLACE) 100 MG capsule Take 10 mg by mouth 2 (two) times daily.       gabapentin (NEURONTIN) 100 MG capsule TAKE 1 CAPSULE BY MOUTH TWICE DAILY, ALONG WITH  MG TWICE DAILY, FOR A TOTAL  MG DAILY 180 capsule 0    gabapentin (NEURONTIN) 300 MG capsule TAKE 1 CAPSULE BY MOUTH TWICE DAILY, TAKE WITH  MG CAPSULE TWICE DAILY. FOR A TOTAL  MG DAILY 180 capsule 0    haloperidoL (HALDOL) 5 MG tablet Take 1 tablet (5 mg total) by mouth 2 (two) times daily. 180 tablet 0    multivitamin (ONE DAILY MULTIVITAMIN) per tablet Take 1 tablet by mouth once daily.      omeprazole (PRILOSEC) 40 MG capsule Take 20 mg by mouth once daily.      propranoloL (INDERAL) 10 MG tablet Take 1 tablet (10 mg total) by mouth 2 (two) times daily. 180 tablet 0    propranoloL (INDERAL) 20 MG tablet Take 1 tablet po q am 90 tablet 0    sertraline (ZOLOFT) 50 MG tablet Take 1 tablet (50 mg total) by mouth once daily. 90 tablet 0     No facility-administered encounter medications on file as of 4/27/2022.       Allergy:  Review of patient's  allergies indicates:   Allergen Reactions    Hydromorphone Shortness Of Breath    Pcn [penicillins] Hives         Assessment and Diagnosis   Status/Progress: Based on the examination today, the patient's problem(s) is/are adequately but not ideally controlled.  New problems have not been presented today.   Co-morbidities are complicating management of the primary condition.         General Impression:       ICD-10-CM ICD-9-CM   1. 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 Anxiety  F41.9 300.00   3. Marijuana use  F12.90 305.20   4. Poor impulse control  R45.87 312.30       Intervention/Counseling/Treatment Plan     · Medication Management:  · Continue Haldol 5 mg BID  · Continue Zoloft 50 mg q day  · Continue Gabapentin 400 mg BID (takes a 300 mg cap with a 100 mg cap BID)  · Continue Cogentin 1 mg BID  · Continue propranolol 30 mg q am and 10 mg q hs  · Referred pt to the Eating Disorders program at [facility]  · Will consider adding Risperdal in the future if needed.  · Labs: most recent labs reviewed;   I discussed with the patient the risks of extrapyramidal side effects (dystonia, akathisia, parkinsonism), metabolic syndrome (inlcuding hyperglycemia and hyperlipidemia), Neuroleptic Malignant Syndrome (fever, muscle rigidity, autonomic instability), orthostatic hypotension, and tardive dyskinesia with antipsychotic use. Pt was told to report these symptoms right away. Pt was notified that it is possible for these movements to become permanent. The risks and benefits of medication were discussed with the patient. The patient expresses understanding of the above and displays the capacity to agree with this treatment given said understanding. Patient also agrees that, currently, the benefits outweigh the risks and would like to pursue treatment at this time.   The pt was told that the antidepressant medication must be taken every day in order to see any improvement in  symptoms. Pt was told that it takes 4-6 weeks to see the full therapeutic effect of the medication. Was told that the effects of the medication are very individualized and possible SE of the medication were discussed. Counseled on FDA black box warning of increased suicidality and contingency plans if such occurs. The patient expresses understanding of the former and displays the capacity to agree with the current plan.   Pt was encouraged not to use marijuana and was educated on the potential consequences of marijuana use. Pt was educated on the following: the most commonly reported side effects of using marijuana are intense anxiety and panic attacks and that the drug can actually increase symptoms of depression or promote the development of this disorder; the drug appears to induce manic episodes and increases rapid cycling between manic and depressive moods; marijuana exacerbates psychotic symptoms and worsens outcomes in patients already diagnosed with schizophrenia or other psychotic disorders; and that studies suggest that long-term use of marijuana may cause lasting impairments in executive function.   · Encouraged pt to eat a healthful diet and to get regular physical exercise (20-30 minutes/day).  · The treatment plan and follow up plan were reviewed with the patient.  · Discussed with patient informed consent, risks vs. benefits, alternative treatments, side effect profile and the inherent unpredictability of individual responses to these treatments. The patient expresses understanding of the above and displays the capacity to agree with this current plan and had no other questions.  · Encouraged Patient to keep future appointments.   · Take medications as prescribed and abstain from substance abuse.   · Pt was told to present to ED or call 911 for SI/HI plan or intent, psychosis, or other psychiatric or medical emergency, and pt verbalized understanding.        Return to Clinic: 2  weeks        Face-to-face time with patient:  36 minutes  Total time:  55 minutes of total time spent on the encounter, which includes face to face time and non-face to face time preparing to see the patient (eg, review of tests), Obtaining and/or reviewing separately obtained history, Documenting clinical information in the electronic or other health record, Independently interpreting results (not separately reported) and communicating results to the patient/family/caregiver, or Care coordination (not separately reported).       Gerda Dinero, MSN, APRN, PMHNP-BC  [facility]

## 2022-04-28 ENCOUNTER — PATIENT MESSAGE (OUTPATIENT)
Dept: NEUROLOGY | Facility: CLINIC | Age: 27
End: 2022-04-28
Payer: COMMERCIAL

## 2022-04-28 DIAGNOSIS — G36.0 NEUROMYELITIS OPTICA: ICD-10-CM

## 2022-04-28 DIAGNOSIS — R41.9 NEUROCOGNITIVE DISORDER: Primary | ICD-10-CM

## 2022-04-29 ENCOUNTER — PATIENT MESSAGE (OUTPATIENT)
Dept: PSYCHIATRY | Facility: CLINIC | Age: 27
End: 2022-04-29
Payer: COMMERCIAL

## 2022-05-02 ENCOUNTER — TELEPHONE (OUTPATIENT)
Dept: PSYCHIATRY | Facility: CLINIC | Age: 27
End: 2022-05-02
Payer: COMMERCIAL

## 2022-05-05 ENCOUNTER — PATIENT MESSAGE (OUTPATIENT)
Dept: NEUROLOGY | Facility: CLINIC | Age: 27
End: 2022-05-05
Payer: COMMERCIAL

## 2022-05-05 ENCOUNTER — PATIENT MESSAGE (OUTPATIENT)
Dept: PSYCHIATRY | Facility: CLINIC | Age: 27
End: 2022-05-05
Payer: COMMERCIAL

## 2022-05-05 DIAGNOSIS — Z79.899 HIGH RISK MEDICATION USE: Primary | ICD-10-CM

## 2022-05-10 ENCOUNTER — OFFICE VISIT (OUTPATIENT)
Dept: PSYCHIATRY | Facility: CLINIC | Age: 27
End: 2022-05-10
Payer: COMMERCIAL

## 2022-05-10 DIAGNOSIS — F06.34 MOOD DISORDER WITH MIXED FEATURES DUE TO GENERAL MEDICAL CONDITION: Primary | ICD-10-CM

## 2022-05-10 DIAGNOSIS — F12.90 MARIJUANA USE: ICD-10-CM

## 2022-05-10 DIAGNOSIS — F41.9 ANXIETY: ICD-10-CM

## 2022-05-10 DIAGNOSIS — R45.87 POOR IMPULSE CONTROL: ICD-10-CM

## 2022-05-10 PROCEDURE — 99214 OFFICE O/P EST MOD 30 MIN: CPT | Mod: GT,,, | Performed by: NURSE PRACTITIONER

## 2022-05-10 PROCEDURE — 99214 PR OFFICE/OUTPT VISIT, EST, LEVL IV, 30-39 MIN: ICD-10-PCS | Mod: GT,,, | Performed by: NURSE PRACTITIONER

## 2022-05-10 RX ORDER — ACETAMINOPHEN 325 MG/1
650 TABLET ORAL
Status: CANCELLED | OUTPATIENT
Start: 2022-05-10

## 2022-05-10 RX ORDER — SODIUM CHLORIDE 0.9 % (FLUSH) 0.9 %
10 SYRINGE (ML) INJECTION
Status: CANCELLED | OUTPATIENT
Start: 2022-05-10

## 2022-05-10 RX ORDER — HEPARIN 100 UNIT/ML
500 SYRINGE INTRAVENOUS
Status: CANCELLED | OUTPATIENT
Start: 2022-05-10

## 2022-05-10 RX ORDER — FAMOTIDINE 10 MG/ML
20 INJECTION INTRAVENOUS
Status: CANCELLED | OUTPATIENT
Start: 2022-05-10

## 2022-05-10 NOTE — PROGRESS NOTES
"5/10/2022 2:38 PM  Nohemy Ladd  1995  [id number]    Outpatient Psychiatry Follow-Up Visit (MD/NP) - Telemedicine Visit      The patient location is: Patient reported that her location at the time of this visit was in the [facility]         Visit type: audiovisual      Each patient to whom he or she provides medical services by telemedicine is:  (1) informed of the relationship between the physician and patient and the respective role of any other health care provider with respect to management of the patient; and (2) notified that he or she may decline to receive medical services by telemedicine and may withdraw from such care at any time.        Chief Complaint:  Nohemy Ladd, a 27 y.o. female,who presents today for follow up of mood swings, impulsivity, anxiety and substance abuse.  Met with patient and father.        Interval History/Subjective Report/Content of Current Session:     Pt is a 27 y.o. female with a hx of neuromyelitis optica, pseudobulbar affect, and substance induced bipolar sxs.    Of note, since the pt's last visit, I received a message from Kasia Doll NP, the pt's neurology provider. Ms. Doll informed me that the pt's vomiting is due to the location of the lesions in her brain.    Met with pt and her father for telehealth visit today. Pt states she ate a full meal yesterday and the day before. Her father interjected that she only eats if her friend Dom prepares the food for her. Her father reports that he has breakfast, lunch, and dinner in the freezer and that all the pt has to do is put it in the microwave. She is going this Friday for her infusion and her father states they weigh her. Will see if she has lost any weight. Her most recent BMI - 18.68. She has not yet started PT.    She states she is feeling anxious and that she always feels anxious prior to her infusions. She reports her mood is "good". Her father reports they have not been having any problems with " impulse control.    The pt reports that she smokes marijuana multiple times a day. She states that she smokes it to relax her and to help with appetite. She smokes multiple times a day.    Pt denies recurrent thoughts of death and denies SI/HI. Denies any sxs of ksenia. Denies AVH, paranoia and delusions. No objective s/sx of psychosis or ksenia. Denies any ASE from her Haldol, gabapentin, Zoloft, and Cogentin.     Denies any episodes of syncope.        Psychotherapy:  · Target symptoms: mood disorder, agitation and irritability  · Why chosen therapy is appropriate versus another modality: relevant to diagnosis, patient responds to this modality  · Outcome monitoring methods: self-report, observation, feedback from family  · Therapeutic intervention type: supportive psychotherapy  · Topics discussed/themes: stress related to medical comorbidities, building skills sets for symptom management, substance abuse  · The patient's response to the intervention is accepting. The patient's progress toward treatment goals is limited.   · Duration of intervention: 9 minutes.      Psychotropic medication review  Previous Trials-  Prozac  Abilify   Lithium  Elavil  Hydroxyzine     Current meds-  Haldol  Zoloft  Cogentin  Propranolol  Gabapentin          Review of Systems       Review of Systems   Constitutional: Negative for chills, fever and malaise/fatigue.   Respiratory: Negative for cough and shortness of breath.    Cardiovascular: Negative for chest pain and palpitations.   Gastrointestinal: Positive for nausea and vomiting. Negative for abdominal pain and diarrhea.   Musculoskeletal: Negative for falls and myalgias.   Skin: Negative for rash.   Neurological: Negative for tremors, seizures and headaches.   Psychiatric/Behavioral:        See HPI         Past Medical, Family and Social History: The patient's past medical, family and social history, allergies, current medications, past surgical history, and problem list have been  reviewed and updated as appropriate within the electronic medical record.    Compliance: yes    Side effects: see above    Risk Parameters:  Patient reports no suicidal ideation  Patient reports no homicidal ideation  Patient reports no self-injurious behavior  Patient reports no violent behavior    Exam (detailed: at least 9 elements; comprehensive: all 15 elements)   Constitutional  Vitals:  Most recent vital signs, dated greater than 90 days prior to this appointment, were reviewed.   There were no vitals filed for this visit.     General:  unremarkable, age appropriate, well nourished, casually dressed, neatly groomed, thin     Musculoskeletal  Muscle Strength/Tone:  not examined - JEANNETTE due to virtual visit   Gait & Station:  JEANNETTE due to virtual visit     Psychiatric      Appearance:  unremarkable, age appropriate, well nourished, casually dressed, neatly groomed, thin   Behavior:  normal, friendly and cooperative, eye contact normal     Speech:  no latency; no press   Mood & Affect:  euthymic  congruent and appropriate   Thought Process:  normal and logical   Associations:  intact   Thought Content:  normal, no suicidality, no homicidality, delusions, or paranoia   Insight:  intact, poor awareness of illness   Judgement: behavior is adequate to circumstances   Orientation:  grossly intact   Memory: impaired due to problems with memory   Language: grossly intact   Attention Span & Concentration:  able to focus   Fund of Knowledge:  intact and appropriate to age and level of education     Medications:  Outpatient Encounter Medications as of 5/10/2022   Medication Sig Dispense Refill    benztropine (COGENTIN) 1 MG tablet Take 1 tablet (1 mg total) by mouth 2 (two) times daily. 180 tablet 0    cholecalciferol, vitamin D3, (VITAMIN D3) 25 mcg (1,000 unit) capsule Take 1,000 Units by mouth once daily.      cyanocobalamin (VITAMIN B-12) 1000 MCG tablet Take 100 mcg by mouth once daily.      docusate sodium (COLACE)  100 MG capsule Take 10 mg by mouth 2 (two) times daily.       gabapentin (NEURONTIN) 100 MG capsule TAKE 1 CAPSULE BY MOUTH TWICE DAILY, ALONG WITH  MG TWICE DAILY, FOR A TOTAL  MG DAILY 180 capsule 0    gabapentin (NEURONTIN) 300 MG capsule TAKE 1 CAPSULE BY MOUTH TWICE DAILY, TAKE WITH  MG CAPSULE TWICE DAILY. FOR A TOTAL  MG DAILY 180 capsule 0    haloperidoL (HALDOL) 5 MG tablet Take 1 tablet (5 mg total) by mouth 2 (two) times daily. 180 tablet 0    multivitamin (ONE DAILY MULTIVITAMIN) per tablet Take 1 tablet by mouth once daily.      omeprazole (PRILOSEC) 40 MG capsule Take 20 mg by mouth once daily.      propranoloL (INDERAL) 10 MG tablet Take 1 tablet (10 mg total) by mouth 2 (two) times daily. 180 tablet 0    propranoloL (INDERAL) 20 MG tablet Take 1 tablet po q am 90 tablet 0    sertraline (ZOLOFT) 50 MG tablet Take 1 tablet (50 mg total) by mouth once daily. 90 tablet 0     No facility-administered encounter medications on file as of 5/10/2022.       Allergy:  Review of patient's allergies indicates:   Allergen Reactions    Hydromorphone Shortness Of Breath    Pcn [penicillins] Hives         Assessment and Diagnosis   Status/Progress: Based on the examination today, the patient's problem(s) is/are adequately but not ideally controlled.  New problems have not been presented today.   Co-morbidities are complicating management of the primary condition.         General Impression:       ICD-10-CM ICD-9-CM   1. 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 Anxiety  F41.9 300.00   3. Marijuana use  F12.90 305.20   4. Poor impulse control  R45.87 312.30       Intervention/Counseling/Treatment Plan     · Medication Management:  · Continue Haldol 5 mg BID  · Continue Zoloft 50 mg q day  · Continue Gabapentin 400 mg BID (takes a 300 mg cap with a 100 mg cap BID)  · Continue Cogentin 1 mg BID  · Continue propranolol 30 mg q am and 10 mg q  hs  · Will consider adding Risperdal in the future if needed.  · Labs: most recent labs reviewed;   I discussed with the patient the risks of extrapyramidal side effects (dystonia, akathisia, parkinsonism), metabolic syndrome (inlcuding hyperglycemia and hyperlipidemia), Neuroleptic Malignant Syndrome (fever, muscle rigidity, autonomic instability), orthostatic hypotension, and tardive dyskinesia with antipsychotic use. Pt was told to report these symptoms right away. Pt was notified that it is possible for these movements to become permanent. The risks and benefits of medication were discussed with the patient. The patient expresses understanding of the above and displays the capacity to agree with this treatment given said understanding. Patient also agrees that, currently, the benefits outweigh the risks and would like to pursue treatment at this time.   The pt was told that the antidepressant medication must be taken every day in order to see any improvement in symptoms. Pt was told that it takes 4-6 weeks to see the full therapeutic effect of the medication. Was told that the effects of the medication are very individualized and possible SE of the medication were discussed. Counseled on FDA black box warning of increased suicidality and contingency plans if such occurs. The patient expresses understanding of the former and displays the capacity to agree with the current plan.   Pt was encouraged not to use marijuana and was educated on the potential consequences of marijuana use. Pt was educated on the following: the most commonly reported side effects of using marijuana are intense anxiety and panic attacks and that the drug can actually increase symptoms of depression or promote the development of this disorder; the drug appears to induce manic episodes and increases rapid cycling between manic and depressive moods; marijuana exacerbates psychotic symptoms and worsens outcomes in patients already diagnosed  with schizophrenia or other psychotic disorders; and that studies suggest that long-term use of marijuana may cause lasting impairments in executive function.   · Encouraged pt to eat a healthful diet and to get regular physical exercise (20-30 minutes/day).  · The treatment plan and follow up plan were reviewed with the patient.  · Discussed with patient informed consent, risks vs. benefits, alternative treatments, side effect profile and the inherent unpredictability of individual responses to these treatments. The patient expresses understanding of the above and displays the capacity to agree with this current plan and had no other questions.  · Encouraged Patient to keep future appointments.   · Take medications as prescribed and abstain from substance abuse.   · Pt was told to present to ED or call 911 for SI/HI plan or intent, psychosis, or other psychiatric or medical emergency, and pt verbalized understanding.        Return to Clinic: 2-3 weeks        Face-to-face time with patient:  25 minutes  Total time:  35 minutes of total time spent on the encounter, which includes face to face time and non-face to face time preparing to see the patient (eg, review of tests), Obtaining and/or reviewing separately obtained history, Documenting clinical information in the electronic or other health record, Independently interpreting results (not separately reported) and communicating results to the patient/family/caregiver, or Care coordination (not separately reported).       Gerda Dinero, MSN, APRN, PMHNP-BC [facility]

## 2022-05-13 ENCOUNTER — PATIENT MESSAGE (OUTPATIENT)
Dept: PSYCHIATRY | Facility: CLINIC | Age: 27
End: 2022-05-13
Payer: COMMERCIAL

## 2022-05-13 ENCOUNTER — INFUSION (OUTPATIENT)
Dept: INFUSION THERAPY | Facility: HOSPITAL | Age: 27
End: 2022-05-13
Attending: PSYCHIATRY & NEUROLOGY
Payer: COMMERCIAL

## 2022-05-13 VITALS
TEMPERATURE: 99 F | SYSTOLIC BLOOD PRESSURE: 100 MMHG | HEART RATE: 94 BPM | BODY MASS INDEX: 18.85 KG/M2 | DIASTOLIC BLOOD PRESSURE: 55 MMHG | RESPIRATION RATE: 16 BRPM | HEIGHT: 66 IN | WEIGHT: 117.31 LBS

## 2022-05-13 DIAGNOSIS — G36.0 NEUROMYELITIS OPTICA: Primary | ICD-10-CM

## 2022-05-13 PROCEDURE — 96367 TX/PROPH/DG ADDL SEQ IV INF: CPT | Mod: PN

## 2022-05-13 PROCEDURE — 63600175 PHARM REV CODE 636 W HCPCS: Mod: PN | Performed by: PSYCHIATRY & NEUROLOGY

## 2022-05-13 PROCEDURE — 25000003 PHARM REV CODE 250: Mod: PN | Performed by: PSYCHIATRY & NEUROLOGY

## 2022-05-13 PROCEDURE — 96413 CHEMO IV INFUSION 1 HR: CPT | Mod: PN

## 2022-05-13 PROCEDURE — 96375 TX/PRO/DX INJ NEW DRUG ADDON: CPT | Mod: PN

## 2022-05-13 PROCEDURE — 96415 CHEMO IV INFUSION ADDL HR: CPT | Mod: PN

## 2022-05-13 RX ORDER — HEPARIN 100 UNIT/ML
500 SYRINGE INTRAVENOUS
Status: CANCELLED | OUTPATIENT
Start: 2022-10-14

## 2022-05-13 RX ORDER — SODIUM CHLORIDE 0.9 % (FLUSH) 0.9 %
10 SYRINGE (ML) INJECTION
Status: CANCELLED | OUTPATIENT
Start: 2022-10-14

## 2022-05-13 RX ORDER — ACETAMINOPHEN 325 MG/1
650 TABLET ORAL
Status: CANCELLED | OUTPATIENT
Start: 2022-10-14

## 2022-05-13 RX ORDER — FAMOTIDINE 10 MG/ML
20 INJECTION INTRAVENOUS
Status: COMPLETED | OUTPATIENT
Start: 2022-05-13 | End: 2022-05-13

## 2022-05-13 RX ORDER — FAMOTIDINE 10 MG/ML
20 INJECTION INTRAVENOUS
Status: CANCELLED | OUTPATIENT
Start: 2022-10-14

## 2022-05-13 RX ORDER — ACETAMINOPHEN 325 MG/1
650 TABLET ORAL
Status: COMPLETED | OUTPATIENT
Start: 2022-05-13 | End: 2022-05-13

## 2022-05-13 RX ADMIN — SODIUM CHLORIDE: 0.9 INJECTION, SOLUTION INTRAVENOUS at 10:05

## 2022-05-13 RX ADMIN — DEXTROSE: 50 INJECTION, SOLUTION INTRAVENOUS at 10:05

## 2022-05-13 RX ADMIN — RITUXIMAB 1000 MG: 10 INJECTION, SOLUTION INTRAVENOUS at 11:05

## 2022-05-13 RX ADMIN — DIPHENHYDRAMINE HYDROCHLORIDE 50 MG: 50 INJECTION INTRAMUSCULAR; INTRAVENOUS at 10:05

## 2022-05-13 RX ADMIN — FAMOTIDINE 20 MG: 10 INJECTION INTRAVENOUS at 11:05

## 2022-05-13 RX ADMIN — ACETAMINOPHEN 650 MG: 325 TABLET, FILM COATED ORAL at 10:05

## 2022-05-13 NOTE — PLAN OF CARE
Pt tolerated Rituxan infusion well.  No s/s of infusion reaction noted.  Instructed to call MD with any problems

## 2022-05-31 ENCOUNTER — PATIENT MESSAGE (OUTPATIENT)
Dept: NEUROLOGY | Facility: CLINIC | Age: 27
End: 2022-05-31
Payer: COMMERCIAL

## 2022-06-16 ENCOUNTER — TELEPHONE (OUTPATIENT)
Dept: NEUROLOGY | Facility: CLINIC | Age: 27
End: 2022-06-16
Payer: COMMERCIAL

## 2022-06-16 ENCOUNTER — OFFICE VISIT (OUTPATIENT)
Dept: NEUROLOGY | Facility: CLINIC | Age: 27
End: 2022-06-16
Payer: MEDICARE

## 2022-06-16 DIAGNOSIS — G36.0 NEUROMYELITIS OPTICA: ICD-10-CM

## 2022-06-16 DIAGNOSIS — F02.818 MAJOR NEUROCOGNITIVE DISORDER DUE TO ANOTHER MEDICAL CONDITION WITH BEHAVIORAL DISTURBANCE: Primary | ICD-10-CM

## 2022-06-16 DIAGNOSIS — F06.34 MOOD DISORDER WITH MIXED FEATURES DUE TO GENERAL MEDICAL CONDITION: ICD-10-CM

## 2022-06-16 PROCEDURE — 96138 PR PSYCH/NEUROPSYCH TEST ADMIN/SCORING, BY TECH, 2+ TESTS, 1ST 30 MIN: ICD-10-PCS | Mod: ,,, | Performed by: CLINICAL NEUROPSYCHOLOGIST

## 2022-06-16 PROCEDURE — 96139 PR PSYCH/NEUROPSYCH TEST ADMIN/SCORING, BY TECH, 2+ TESTS, EA ADDTL 30 MIN: ICD-10-PCS | Mod: ,,, | Performed by: CLINICAL NEUROPSYCHOLOGIST

## 2022-06-16 PROCEDURE — 96132 PR NEUROPSYCHOLOGIC TEST EVAL SVCS, 1ST HR: ICD-10-PCS | Mod: ,,, | Performed by: CLINICAL NEUROPSYCHOLOGIST

## 2022-06-16 PROCEDURE — 96138 PSYCL/NRPSYC TECH 1ST: CPT | Mod: ,,, | Performed by: CLINICAL NEUROPSYCHOLOGIST

## 2022-06-16 PROCEDURE — 96139 PSYCL/NRPSYC TST TECH EA: CPT | Mod: ,,, | Performed by: CLINICAL NEUROPSYCHOLOGIST

## 2022-06-16 PROCEDURE — 99499 UNLISTED E&M SERVICE: CPT | Mod: S$PBB,,, | Performed by: CLINICAL NEUROPSYCHOLOGIST

## 2022-06-16 PROCEDURE — 96132 NRPSYC TST EVAL PHYS/QHP 1ST: CPT | Mod: ,,, | Performed by: CLINICAL NEUROPSYCHOLOGIST

## 2022-06-16 PROCEDURE — 96133 NRPSYC TST EVAL PHYS/QHP EA: CPT | Mod: ,,, | Performed by: CLINICAL NEUROPSYCHOLOGIST

## 2022-06-16 PROCEDURE — 96133 PR NEUROPSYCHOLOGIC TEST EVAL SVCS, EA ADDTL HR: ICD-10-PCS | Mod: ,,, | Performed by: CLINICAL NEUROPSYCHOLOGIST

## 2022-06-16 PROCEDURE — 99499 NO LOS: ICD-10-PCS | Mod: S$PBB,,, | Performed by: CLINICAL NEUROPSYCHOLOGIST

## 2022-07-01 DIAGNOSIS — G36.0 NEUROMYELITIS OPTICA: Primary | ICD-10-CM

## 2022-07-01 DIAGNOSIS — R41.89 COGNITIVE IMPAIRMENT: ICD-10-CM

## 2022-07-01 NOTE — ASSESSMENT & PLAN NOTE
Assessment:  -Cognitive Testing: Overallo,  Overall  Nohemy has had a significant/marked decline with her cognitive function since 2016.    More Detailed Narrative  Nohemy's primary deficts involve severe/profoundly impaired memory functioning. This level of impairment (at her age and compared to 2016) is very concerning. Her memory skills now are nearly amnestic with some modest benefit from cues/repetition.    She also has significant trouble with complex attention (sustaining attention beyond a few minutes, shifting her attention) and executive functioning (verbal reasoning, organization, planning).    Her visual/spatial skills have declined quite a bit (2016 AEE=440, 2022 ANABELL=86), as well.     While mental speed is still is average range, it declined 2SDs (WVY=346 in 2016 to PSI=92 currently)       -Etiology/Severity/Present Concerns:  Etiology  Unclear given her age and such substantial decline   While depression, daily heavy THC use, and anxiety can certainly negatively impact her cognition, those issues would NOT cause the level of impairment/decline we are saying on testing today.    Also, testing shows much greater right hemisphere dysfunction when compared to 2016 (with primarily left hemisphere findings 2/2 NMO location in 2016)   Stage/Severity Level  She still has dementia, mild stage.    She must be managed clinically and at home as a patient with dementia rather than just mild cognitive trouble.   Behavioral Issues  Significant depression and anxiety but followed by psychiatry   Other Concerns  Likely needs more daily supervision support and will discuss with treatment team and her family         Plan:  Neuropsychology Follow-up · Feedback session to review results/plan of care   · Reevaluation is recommended in 6-12 months following implementation of recommendations to track cognitive status, refine diagnosis, update treatment plan.     Neurology Follow-up  Continued follow up but reached  out to Neuro [place] team given such significant changes.    Recommend follow up MRI or other diagnostics and potential referral to behavioral neurology   Mental Health Follow-up · Psychiatry/Medical Psychology: Continued consultation but she likely needs weekly therapy along with medication management. Will discuss with patient and family.    Daily Living/Supervision  Will discuss with family but her level of impairment raises concerns about her ability to live completely by herself.

## 2022-07-12 ENCOUNTER — TELEPHONE (OUTPATIENT)
Dept: NEUROLOGY | Facility: CLINIC | Age: 27
End: 2022-07-12
Payer: COMMERCIAL

## 2022-07-12 NOTE — TELEPHONE ENCOUNTER
Spoke with Mr. Ladd (pt father), he was informed Ms. Ladd appt on 9/6/2022 will need to be rescheduled due to the provider not being in clinic. Mr. Ladd verbalized understanding rescheduling the appt for 10/3/2022 at 2:15. Mailed appt letter. Placed appt on the waiting list.

## 2022-07-22 ENCOUNTER — TELEPHONE (OUTPATIENT)
Dept: NEUROLOGY | Facility: CLINIC | Age: 27
End: 2022-07-22

## 2022-07-22 DIAGNOSIS — E46 MALNUTRITION, UNSPECIFIED TYPE: ICD-10-CM

## 2022-07-22 DIAGNOSIS — G36.0 NEUROMYELITIS OPTICA: ICD-10-CM

## 2022-07-22 DIAGNOSIS — E67.2 MEGAVITAMIN-B6 SYNDROME: ICD-10-CM

## 2022-07-22 DIAGNOSIS — R41.89 COGNITIVE IMPAIRMENT: Primary | ICD-10-CM

## 2022-07-22 DIAGNOSIS — F03.91 DEMENTIA WITH BEHAVIORAL DISTURBANCE, UNSPECIFIED DEMENTIA TYPE: ICD-10-CM

## 2022-08-05 NOTE — TELEPHONE ENCOUNTER
Her mother reports that Nohemy reports significant vertigo lately, sometimes leading to her falling. Her mother is concerned that she may have POTS, as Nohemy's brother is also being worked up for this at the moment.

## 2022-08-10 DIAGNOSIS — G36.0 NEUROMYELITIS OPTICA: Primary | ICD-10-CM

## 2022-08-10 DIAGNOSIS — F12.90 MARIJUANA USE: ICD-10-CM

## 2022-08-11 ENCOUNTER — LAB VISIT (OUTPATIENT)
Dept: LAB | Facility: HOSPITAL | Age: 27
End: 2022-08-11
Attending: CLINICAL NURSE SPECIALIST
Payer: COMMERCIAL

## 2022-08-11 DIAGNOSIS — R41.89 COGNITIVE IMPAIRMENT: ICD-10-CM

## 2022-08-11 DIAGNOSIS — F03.91 DEMENTIA WITH BEHAVIORAL DISTURBANCE, UNSPECIFIED DEMENTIA TYPE: ICD-10-CM

## 2022-08-11 DIAGNOSIS — E67.2 MEGAVITAMIN-B6 SYNDROME: ICD-10-CM

## 2022-08-11 DIAGNOSIS — F12.90 MARIJUANA USE: ICD-10-CM

## 2022-08-11 DIAGNOSIS — E46 MALNUTRITION, UNSPECIFIED TYPE: ICD-10-CM

## 2022-08-11 DIAGNOSIS — G36.0 NEUROMYELITIS OPTICA: ICD-10-CM

## 2022-08-11 LAB
ALBUMIN SERPL BCP-MCNC: 3.8 G/DL (ref 3.5–5.2)
ALP SERPL-CCNC: 124 U/L (ref 55–135)
ALT SERPL W/O P-5'-P-CCNC: 9 U/L (ref 10–44)
ANION GAP SERPL CALC-SCNC: 9 MMOL/L (ref 8–16)
AST SERPL-CCNC: 10 U/L (ref 10–40)
BILIRUB SERPL-MCNC: 0.3 MG/DL (ref 0.1–1)
BUN SERPL-MCNC: 7 MG/DL (ref 6–20)
CA-I BLDV-SCNC: 1.32 MMOL/L (ref 1.06–1.42)
CALCIUM SERPL-MCNC: 9.4 MG/DL (ref 8.7–10.5)
CHLORIDE SERPL-SCNC: 106 MMOL/L (ref 95–110)
CO2 SERPL-SCNC: 23 MMOL/L (ref 23–29)
CREAT SERPL-MCNC: 0.8 MG/DL (ref 0.5–1.4)
EST. GFR  (NO RACE VARIABLE): >60 ML/MIN/1.73 M^2
GLUCOSE SERPL-MCNC: 82 MG/DL (ref 70–110)
POTASSIUM SERPL-SCNC: 3.5 MMOL/L (ref 3.5–5.1)
PROT SERPL-MCNC: 6.4 G/DL (ref 6–8.4)
SODIUM SERPL-SCNC: 138 MMOL/L (ref 136–145)

## 2022-08-11 PROCEDURE — 36415 COLL VENOUS BLD VENIPUNCTURE: CPT | Mod: PO | Performed by: CLINICAL NURSE SPECIALIST

## 2022-08-11 PROCEDURE — 82525 ASSAY OF COPPER: CPT | Performed by: CLINICAL NURSE SPECIALIST

## 2022-08-11 PROCEDURE — 80307 DRUG TEST PRSMV CHEM ANLYZR: CPT | Performed by: CLINICAL NURSE SPECIALIST

## 2022-08-11 PROCEDURE — 84207 ASSAY OF VITAMIN B-6: CPT | Performed by: PSYCHIATRY & NEUROLOGY

## 2022-08-11 PROCEDURE — 84425 ASSAY OF VITAMIN B-1: CPT | Performed by: CLINICAL NURSE SPECIALIST

## 2022-08-11 PROCEDURE — 80053 COMPREHEN METABOLIC PANEL: CPT | Performed by: CLINICAL NURSE SPECIALIST

## 2022-08-11 PROCEDURE — 86592 SYPHILIS TEST NON-TREP QUAL: CPT | Performed by: CLINICAL NURSE SPECIALIST

## 2022-08-11 PROCEDURE — 82330 ASSAY OF CALCIUM: CPT | Performed by: CLINICAL NURSE SPECIALIST

## 2022-08-12 LAB — RPR SER QL: NORMAL

## 2022-08-15 LAB
AMPHETAMINES SERPL QL: NEGATIVE
BARBITURATES SERPL QL SCN: NEGATIVE
BENZODIAZ SERPL QL SCN: NEGATIVE
BZE SERPL QL: NEGATIVE
CARBOXYTHC SERPL QL SCN: POSITIVE
COPPER SERPL-MCNC: 983 UG/L (ref 810–1990)
ETHANOL SERPL QL SCN: NEGATIVE
METHADONE SERPL QL SCN: NEGATIVE
OPIATES SERPL QL SCN: NEGATIVE
PCP SERPL QL SCN: NEGATIVE
PROPOXYPH SERPL QL: NEGATIVE
VIT B12 SERPL-MCNC: >1400 NG/L (ref 180–914)

## 2022-08-16 LAB
PYRIDOXAL SERPL-MCNC: 7 UG/L (ref 5–50)
VIT B2 SERPL-MCNC: 2 MCG/L (ref 1–19)

## 2022-08-17 LAB — VIT B1 BLD-MCNC: 52 UG/L (ref 38–122)

## 2022-08-22 ENCOUNTER — PATIENT OUTREACH (OUTPATIENT)
Dept: ADMINISTRATIVE | Facility: HOSPITAL | Age: 27
End: 2022-08-22
Payer: COMMERCIAL

## 2022-08-22 ENCOUNTER — PATIENT MESSAGE (OUTPATIENT)
Dept: ADMINISTRATIVE | Facility: HOSPITAL | Age: 27
End: 2022-08-22
Payer: COMMERCIAL

## 2022-08-22 NOTE — PROGRESS NOTES
[facility] CMS chart audits-PCP VISIT/ANNUAL WELLNESS VISIT. Chart review completed for HM test overdue (mammograms, Colonoscopies, pap smears, DM labs, and/or EYE EXAMs)      Care Everywhere and media, updates requested and reviewed.      RE:  Patient needs PCP Visit-2022    Message sent to patient's portal.

## 2022-08-29 ENCOUNTER — HOSPITAL ENCOUNTER (OUTPATIENT)
Dept: NEUROLOGY | Facility: CLINIC | Age: 27
Discharge: HOME | End: 2022-08-29
Payer: COMMERCIAL

## 2022-08-29 ENCOUNTER — HOSPITAL ENCOUNTER (OUTPATIENT)
Dept: RADIOLOGY | Facility: HOSPITAL | Age: 27
Discharge: HOME | End: 2022-08-29
Attending: CLINICAL NURSE SPECIALIST
Payer: COMMERCIAL

## 2022-08-29 ENCOUNTER — PATIENT MESSAGE (OUTPATIENT)
Dept: NEUROLOGY | Facility: CLINIC | Age: 27
End: 2022-08-29

## 2022-08-29 ENCOUNTER — OFFICE VISIT (OUTPATIENT)
Dept: NEUROLOGY | Facility: CLINIC | Age: 27
End: 2022-08-29
Payer: COMMERCIAL

## 2022-08-29 VITALS
HEART RATE: 80 BPM | DIASTOLIC BLOOD PRESSURE: 78 MMHG | HEIGHT: 67 IN | WEIGHT: 115.44 LBS | SYSTOLIC BLOOD PRESSURE: 110 MMHG | BODY MASS INDEX: 18.12 KG/M2

## 2022-08-29 DIAGNOSIS — R41.89 COGNITIVE IMPAIRMENT: ICD-10-CM

## 2022-08-29 DIAGNOSIS — Z71.89 COUNSELING REGARDING GOALS OF CARE: ICD-10-CM

## 2022-08-29 DIAGNOSIS — F02.818 MAJOR NEUROCOGNITIVE DISORDER DUE TO ANOTHER MEDICAL CONDITION WITH BEHAVIORAL DISTURBANCE: ICD-10-CM

## 2022-08-29 DIAGNOSIS — G36.0 NEUROMYELITIS OPTICA: Primary | ICD-10-CM

## 2022-08-29 DIAGNOSIS — G36.0 NEUROMYELITIS OPTICA: ICD-10-CM

## 2022-08-29 DIAGNOSIS — Z79.899 HIGH RISK MEDICATION USE: ICD-10-CM

## 2022-08-29 DIAGNOSIS — Z29.89 PROPHYLACTIC IMMUNOTHERAPY: ICD-10-CM

## 2022-08-29 PROCEDURE — 3074F PR MOST RECENT SYSTOLIC BLOOD PRESSURE < 130 MM HG: ICD-10-PCS | Mod: CPTII,S$GLB,, | Performed by: CLINICAL NURSE SPECIALIST

## 2022-08-29 PROCEDURE — A9585 GADOBUTROL INJECTION: HCPCS | Performed by: CLINICAL NURSE SPECIALIST

## 2022-08-29 PROCEDURE — 99215 PR OFFICE/OUTPT VISIT, EST, LEVL V, 40-54 MIN: ICD-10-PCS | Mod: S$GLB,,, | Performed by: CLINICAL NURSE SPECIALIST

## 2022-08-29 PROCEDURE — 25500020 PHARM REV CODE 255: Performed by: CLINICAL NURSE SPECIALIST

## 2022-08-29 PROCEDURE — 99999 PR PBB SHADOW E&M-EST. PATIENT-LVL III: ICD-10-PCS | Mod: PBBFAC,,, | Performed by: CLINICAL NURSE SPECIALIST

## 2022-08-29 PROCEDURE — 3008F PR BODY MASS INDEX (BMI) DOCUMENTED: ICD-10-PCS | Mod: CPTII,S$GLB,, | Performed by: CLINICAL NURSE SPECIALIST

## 2022-08-29 PROCEDURE — 70553 MRI BRAIN STEM W/O & W/DYE: CPT | Mod: 26,,, | Performed by: RADIOLOGY

## 2022-08-29 PROCEDURE — 3008F BODY MASS INDEX DOCD: CPT | Mod: CPTII,S$GLB,, | Performed by: CLINICAL NURSE SPECIALIST

## 2022-08-29 PROCEDURE — 3044F HG A1C LEVEL LT 7.0%: CPT | Mod: CPTII,S$GLB,, | Performed by: CLINICAL NURSE SPECIALIST

## 2022-08-29 PROCEDURE — 95816 EEG AWAKE AND DROWSY: CPT | Mod: S$GLB,,, | Performed by: PSYCHIATRY & NEUROLOGY

## 2022-08-29 PROCEDURE — 3078F DIAST BP <80 MM HG: CPT | Mod: CPTII,S$GLB,, | Performed by: CLINICAL NURSE SPECIALIST

## 2022-08-29 PROCEDURE — 70553 MRI BRAIN STEM W/O & W/DYE: CPT | Mod: TC

## 2022-08-29 PROCEDURE — 3044F PR MOST RECENT HEMOGLOBIN A1C LEVEL <7.0%: ICD-10-PCS | Mod: CPTII,S$GLB,, | Performed by: CLINICAL NURSE SPECIALIST

## 2022-08-29 PROCEDURE — 99999 PR PBB SHADOW E&M-EST. PATIENT-LVL III: CPT | Mod: PBBFAC,,, | Performed by: CLINICAL NURSE SPECIALIST

## 2022-08-29 PROCEDURE — 99215 OFFICE O/P EST HI 40 MIN: CPT | Mod: S$GLB,,, | Performed by: CLINICAL NURSE SPECIALIST

## 2022-08-29 PROCEDURE — 3078F PR MOST RECENT DIASTOLIC BLOOD PRESSURE < 80 MM HG: ICD-10-PCS | Mod: CPTII,S$GLB,, | Performed by: CLINICAL NURSE SPECIALIST

## 2022-08-29 PROCEDURE — 1159F PR MEDICATION LIST DOCUMENTED IN MEDICAL RECORD: ICD-10-PCS | Mod: CPTII,S$GLB,, | Performed by: CLINICAL NURSE SPECIALIST

## 2022-08-29 PROCEDURE — 95816 PR EEG,W/AWAKE & DROWSY RECORD: ICD-10-PCS | Mod: S$GLB,,, | Performed by: PSYCHIATRY & NEUROLOGY

## 2022-08-29 PROCEDURE — 1159F MED LIST DOCD IN RCRD: CPT | Mod: CPTII,S$GLB,, | Performed by: CLINICAL NURSE SPECIALIST

## 2022-08-29 PROCEDURE — 70553 MRI BRAIN DEMYELINATING W/ WO CONTRAST: ICD-10-PCS | Mod: 26,,, | Performed by: RADIOLOGY

## 2022-08-29 PROCEDURE — 3074F SYST BP LT 130 MM HG: CPT | Mod: CPTII,S$GLB,, | Performed by: CLINICAL NURSE SPECIALIST

## 2022-08-29 RX ORDER — GADOBUTROL 604.72 MG/ML
6 INJECTION INTRAVENOUS
Status: COMPLETED | OUTPATIENT
Start: 2022-08-29 | End: 2022-08-29

## 2022-08-29 RX ADMIN — GADOBUTROL 6 ML: 604.72 INJECTION INTRAVENOUS at 09:08

## 2022-08-29 NOTE — Clinical Note
Rituxan needs to be done before 11/12. Her dad is going out of town for several months after that date.

## 2022-08-29 NOTE — Clinical Note
Juana, just wanted to keep you in the loop. I know we briefly talked about this, but if there are any aide/sitter services for which Nohemy may qualify, can we explore? I think her time of living alone is not feasible for much longer.

## 2022-08-29 NOTE — PROCEDURES
Procedures    ELECTROENCEPHALOGRAM REPORT    DATE OF SERVICE: 8/29/22  EEG NUMBER: OC   REQUESTED BY: Sharmila  [place] [facility] [place]    METHODOLOGY   Electroencephalographic (EEG) recording is with electrodes placed according to the International 10-20 placement system.  Thirty two (32) channels of digital signal (sampling rate of 512/sec) including T1 and T2 was simultaneously recorded from the scalp and may include  EKG, EMG, and/or eye monitors.  Recording band pass was 0.1 to 512 hz.  Digital video recording of the patient is simultaneously recorded with the EEG.  The patient is instructed report clinical symptoms which may occur during the recording session.  EEG and video recording is stored and archived in digital format. Activation procedures which include photic stimulation, hyperventilation and instructing patients to perform simple task are done in selected patients.    The EEG is displayed on a monitor screen and can be reviewed using different montages.  Computer assisted analysis is employed to detect spike and electrographic seizure activity.   The entire record is submitted for computer analysis.  The entire recording is visually reviewed and the times identified by computer analysis as being spikes or seizures are reviewed again.  Compresses spectral analysis (CSA) is also performed on the activity recorded from each individual channel.  This is displayed as a power display of frequencies from 0 to 30 Hz over time.   The CSA is reviewed looking for asymmetries in power between homologous areas of the scalp and then compared with the original EEG recording.     [employer] software was also utilized in the review of this study.  This software suite analyzes the EEG recording in multiple domains.  Coherence and rhythmicity is computed to identify EEG sections which may contain organized seizures.  Each channel undergoes analysis to detect presence of spike and sharp waves which have special and  morphological characteristic of epileptic activity.  The routine EEG recording is converted from spacial into frequency domain.  This is then displayed comparing homologous areas to identify areas of significant asymmetry.  Algorithm to identify non-cortically generated artifact is used to separate eye movement, EMG and other artifact from the EEG    EEG FINDINGS  The record shows a fair  organization at rest, consisting of a  8 Hz posterior dominant rhythm with good  reactivity. There is mild bilateral beta activity. There is mild continuous diffuse theta range background slowing.     Drowsiness is characterized by attenuation of the background, vertex waves, and bilateral theta slowing.     Provocative maneuvers including hyperventilation and photic stimulation were performed.     EKG recording shows a regular rhythm.    There is no push button or clinical event.    IMPRESSION:  Abnormal study due to mild  diffuse background slowing consistent with diffuse cerebral dysfunction and encephalopathy which may be on the basis of toxic, metabolic, or primary neuronal disorder.     William Merino MD

## 2022-08-29 NOTE — Clinical Note
Gerda Sheets Kalie said she is in need of a propranolol refill and requests a 90 day supply. Thanks!  Kasia

## 2022-08-29 NOTE — PROGRESS NOTES
"Subjective:       Patient ID: Nohemy Ladd is a 27 y.o. female who presents today for a routine clinic visit for NMO. The history has been provided by the patient. She is accompanied by her father.     NMO HPI:  DMT: Rituxan; last infused on 5/13; due next in November or sooner if B cells repopulate.   Side effects from DMT? No  Taking vitamin D3 as recommended? Yes - Dose:   BP has been fluctuating to as low as 80s/50s.  She has had episodes of vertigo. Her father reports that these happen with sudden position changes. She "blacks out," but is not sure how long she is out. Her father thinks this is a few minutes at most. She forgets that she has to take her time when she gets up.   She is eating a little bit more, but has not gained any weight. She has actually lost 2 lbs in the past 3 months.   She has not been vomiting as much. She takes her medication after eating, and this seems to help.   Her father reports that her memory has drastically declined in the past 6 months. She forgets what she is talking about mid-sentence. She does not handle any high-level things. She has an automatic medication dispenser.   She will be seeing Dr. Walker soon.   She is still seeing Gerda Comer in psychiatry for medication management.   She had all of her teeth pulled and will get dentures soon.   She is living independently in a trailer. She continues to use marijuana regularly. Her family believes that she needs some live-in assistance.   She reports having a very bad chest pain and some shortness of breath last week.   She has had some issues distinguishing between a dream and reality. This has happened twice.   She has slept for 24 hours at a time at least a few times in the past few months.   She has random itching to her back. Her father has tinea versocolor, and he wonders if this is what she has.   She has generalized weakness, and her father thinks she is losing muscle mass.     SOCIAL HISTORY  Social " History     Tobacco Use    Smoking status: Every Day     Packs/day: 0.25     Types: Cigarettes    Smokeless tobacco: Never   Substance Use Topics    Alcohol use: Not Currently    Drug use: Yes     Types: Marijuana     Comment: last smoked marijuana last week     Living arrangements - the patient lives alone.  Employment:         Objective:        1. 25 foot timed walk: 4.5 seconds today     Neurologic Exam      Remainder of exam deferred     Imaging:   Results for orders placed during the hospital encounter of 09/20/17      Reading Physician Reading Date Result Priority   Jose Velazco DO  [phone]  [phone] 8/29/2022 Routine     Narrative & Impression  EXAMINATION:  MRI BRAIN DEMYELINATING W/ WO CONTRAST     CLINICAL HISTORY:  Neuromyelitis optica (devic)     TECHNIQUE:  Sagittal 3D space FLAIR which was reformatted in the coronal and sagittal planes.  Axial T2, axial gradient, axial T1 and axial diffusion imaging of the whole brain without contrast.  Following contrast administration axial T1 and sagittal T1 spoiled gradient which was reformatted in the coronal and axial planes were performed.  Six ML Gadavist intravenous contrast.     COMPARISON:  04/27/2021     FINDINGS:  Continued focus of slight volume loss with T2 flair signal hyperintensity left frontal periventricular white matter.  No restricted diffusion.  There is no significant new parenchymal signal abnormality.  Continued T2 FLAIR signal hyperintensity along the anterior superomedial left thalamus.  Continued tubular enhancement right posterior temporal/parietal lobe compatible with developmental venous anomaly there is no abnormal parenchymal enhancement.  No abnormal parenchymal susceptibility to suggest parenchymal hemorrhage.  Major intracranial skull base T2 flow voids are present.     Impression:     No significant change from prior.  Essentially stable T2 FLAIR lesion burden within the brain parenchyma most prominent left frontal  periventricular white matter.  This remains nonspecific and may be sequela of prior demyelination     No evidence for new lesion or enhancing lesion to suggest significant interval or active demyelination     Clinical correlation and continued follow-up advised.        Electronically signed by: Jose Velazco DO  Date:                                            08/29/2022  Time:                                           09:40      Labs:     Lab Results   Component Value Date    [id number] 43 04/13/2022    [id number] 34 08/19/2020    [id number] 26 (L) 08/13/2019       Diagnosis/Assessment/Plan:    1. Neuromyelitis Optica  Assessment: Nohemy is physically stable, but she has clear cognitive and psychiatric impairments (likely as a result of her NMO) that are limiting her ability to live alone. Dr. Painting was present for much of the visit today to go over results of NP testing, and he will meet with Nohemy's parents separately. She is very thin and on the verge of malnourishment. Her vomiting has improved though.   Imaging: MRI brain is stable   Disease Modifying Therapies: Continue Rituxan and Vitamin D. Her next infusion is due in November. We will try to coordinate lab draws locally so her father does not have to bring her all the way to an [facility] facility.     2. NMO Symptom Assessment / Management  Pain: She has itching to her back. This may be neuropathic, but if rash appears, we can refer to dermatology.   Mood: Continue follow up with psychiatry. She will see Dr. Aaron gutierrez (behavioral neurology).     She will follow up with Dr. Guadarrama in December.     Total time spent with patient: 53 minutes   Total time spent on encounter: 70 minutes    Problem List Items Addressed This Visit          Neurologic Problems    Neuromyelitis optica - Primary    Relevant Orders    CBC auto differential    Rituxan Sensitivity    CBC auto differential    Rituxan Sensitivity    Hepatitis B Surface Antigen    Hepatitis B Surface  Ab, Qualitative    Hepatitis B Core Antibody, Total    Immunoglobulins (IgG, IgA, IgM) Quantitative    Major neurocognitive disorder due to another medical condition with behavioral disturbance     Other Visit Diagnoses       Counseling regarding goals of care        Prophylactic immunotherapy        High risk medication use

## 2022-08-30 ENCOUNTER — PATIENT MESSAGE (OUTPATIENT)
Dept: NEUROLOGY | Facility: CLINIC | Age: 27
End: 2022-08-30
Payer: COMMERCIAL

## 2022-08-30 DIAGNOSIS — F41.9 ANXIETY: ICD-10-CM

## 2022-08-30 DIAGNOSIS — F06.34 MOOD DISORDER WITH MIXED FEATURES DUE TO GENERAL MEDICAL CONDITION: Primary | ICD-10-CM

## 2022-08-30 DIAGNOSIS — R45.87 POOR IMPULSE CONTROL: ICD-10-CM

## 2022-08-30 RX ORDER — PROPRANOLOL HYDROCHLORIDE 20 MG/1
TABLET ORAL
Qty: 90 TABLET | Refills: 1 | Status: SHIPPED | OUTPATIENT
Start: 2022-08-30 | End: 2022-09-09 | Stop reason: SDUPTHER

## 2022-09-07 ENCOUNTER — OFFICE VISIT (OUTPATIENT)
Dept: NEUROLOGY | Facility: CLINIC | Age: 27
End: 2022-09-07
Payer: COMMERCIAL

## 2022-09-07 DIAGNOSIS — F02.818 MAJOR NEUROCOGNITIVE DISORDER DUE TO ANOTHER MEDICAL CONDITION WITH BEHAVIORAL DISTURBANCE: Primary | ICD-10-CM

## 2022-09-07 PROCEDURE — 99499 UNLISTED E&M SERVICE: CPT | Mod: 95,,, | Performed by: CLINICAL NEUROPSYCHOLOGIST

## 2022-09-07 PROCEDURE — 99499 NO LOS: ICD-10-PCS | Mod: 95,,, | Performed by: CLINICAL NEUROPSYCHOLOGIST

## 2022-09-07 NOTE — ASSESSMENT & PLAN NOTE
-Feedback session completed to discuss results and plan today and for 20-minutes in person on 08/29/22 neurology [place] clinic visit. Review Neuropsychology Consult dated for 6/16/2022 for complete details of feedback discussion, diagnosis, treatment plan.  -Additional concerns:  1. Lengthy discussion today with parents around need for increased daily supervision but parents note difficulty with this given lack of community resources and housing options.  2. Discussed potential of Lisa meeting criteria for a developmental disability and recommended the explore this option in [place]  3. Ideally, she would live in a supervised setting rather than current location. But, she is not at imminent risk requiring call to APS.  4. If no ability of family to improve nutrition and health status over the next few months OR no movement on placement in a supervised setting within 3-months, recommend additional medical assessment and opinion as to whether higher level of supervision is needed and how urgently.  -Follow-up: 3-months

## 2022-09-07 NOTE — PROGRESS NOTES
NEUROPSYCHOLOGY FEEDBACK (TELEHEALTH)    Referral Information  Name: Nohemy Ladd  MRN: [id number]  : 1995  Age: 27 y.o.  Billing: Charges for Neuropsychology Feedback billed on 2022    Telemedicine:   [place] is: [place]  [place] [facility] [place]  The chief complaint leading to consultation/medical necessity is: Feedback session for results/treatment plan discussion  Visit type: Virtual visit with audio only (telephone) due to patient technology difficulties  Total time spent with patient: 60-minutes (Patient deferred tele-feedback to parents given her dementia and we had a 20-min unscheduled feedback on 22 when she was in neuro[place] clinic and family had various questions the provider needed my assistance)  Each patient to whom he or she provides medical services by telemedicine is:  (1) informed of the relationship between the physician and patient and the respective role of any other health care provider with respect to management of the patient; and (2) notified that he or she may decline to receive medical services by telemedicine and may withdraw from such care at any time.      Problem List Items Addressed This Visit          Neuro    Major neurocognitive disorder due to another medical condition with behavioral disturbance - Primary    Current Assessment & Plan     -Feedback session completed to discuss results and plan today and for 20-minutes in person on 22 neurology [place] clinic visit. Review Neuropsychology Consult dated for 2022 for complete details of feedback discussion, diagnosis, treatment plan.  -Additional concerns:  1. Lengthy discussion today with parents around need for increased daily supervision but parents note difficulty with this given lack of community resources and housing options.  2. Discussed potential of Lisa meeting criteria for a developmental disability and recommended the explore this option in [place]  3. Ideally, she would live  in a supervised setting rather than current location. But, she is not at imminent risk requiring call to APS.  4. If no ability of family to improve nutrition and health status over the next few months OR no movement on placement in a supervised setting within 3-months, recommend additional medical assessment and opinion as to whether higher level of supervision is needed and how urgently.  -Follow-up: 3-months

## 2022-09-07 NOTE — Clinical Note
Pls schedule follow up on same day they come to see dr. Guadarrama on 12/21; ideally before her visit

## 2022-09-07 NOTE — LETTER
This communication is flagged as high priority.      September 9, 2022        SCOTT Sullivan, CNS  [address] [facility] [address] [facility] [address]             [facility] [address]  [address]  [place] [address]  Phone: [phone]  Fax: [phone]   Patient: Nohemy Ladd   MR Number: [id number]   YOB: 1995   Date of Visit: 9/7/2022     Dear Dr. Doll,     See attached my recent visit with the green family.    Sincerely,      PORTER Painting II, PhD            CC  MD Juana Estrada, [facility]    Enclosure

## 2022-09-09 ENCOUNTER — PATIENT MESSAGE (OUTPATIENT)
Dept: PSYCHIATRY | Facility: CLINIC | Age: 27
End: 2022-09-09
Payer: COMMERCIAL

## 2022-09-09 DIAGNOSIS — F06.34 MOOD DISORDER WITH MIXED FEATURES DUE TO GENERAL MEDICAL CONDITION: ICD-10-CM

## 2022-09-09 DIAGNOSIS — R45.87 POOR IMPULSE CONTROL: ICD-10-CM

## 2022-09-09 DIAGNOSIS — F41.9 ANXIETY: ICD-10-CM

## 2022-09-09 RX ORDER — PROPRANOLOL HYDROCHLORIDE 20 MG/1
TABLET ORAL
Qty: 60 TABLET | Refills: 1 | Status: SHIPPED | OUTPATIENT
Start: 2022-09-09 | End: 2022-10-10 | Stop reason: SDUPTHER

## 2022-09-19 ENCOUNTER — TELEPHONE (OUTPATIENT)
Dept: PSYCHIATRY | Facility: CLINIC | Age: 27
End: 2022-09-19
Payer: COMMERCIAL

## 2022-09-19 NOTE — TELEPHONE ENCOUNTER
NAM phoned pt's mother regarding care for pt because pt is no longer able to live on her own/provide safe care of herself.  Pt is currently living [place]per[place], alone.  Not in the same county as family, [facility].  Father and step-mother are going to be traveling the country in an .      Does need reminders to eat and take medications; mom said she frequently doesn't remember eating; dad manages finances; no wandering or getting lost; isn't driving a vehicle; family is keeping her 's license; poor hygiene and bathing since her diagnosis (12-14 days w/o bathing); she refuses to wear underwear even when menstruating.  However, mom reports some improvement with hygiene since ECT.  Has had all teeth pulled because didn't brush teeth for months.  Has bone protruding through gums before dentures can be prepared.      Mom has called about HCB Waivers but says she was told Nohemy would only qualify for the Independent Living Waiver.  We also discussed the possibility of the Intellectual Disabilities/Dev Disabilities Waiver, etc.  NAM will make calls this week.        (Pt was 21 when she was diagnosed and was first hospitalized in September 2016).

## 2022-09-20 NOTE — TELEPHONE ENCOUNTER
NAM following up on services in [place] for pt.      Phoned and LVM with the [place] on Developmental Disabilities [phone].      NAM spoke with , who directed SW to call [place] [facility] ([facility]) for more information.  She did explain the the Intellectual/Developmental Disabilities Waiver has a wait list of 8-10 years.  If family wants to apply then should call [facility] at [phone].    She said there's a second part of the IDD wait list that provides day program services and which pt may be eligible for now, without a wait.      Regarding the Independent Living Waiver - could provide day program, [place] [facility] [phone] or [facility] [phone].    Regarding the Elderly and Disabled Waiver - could provide day program, call [facility], [phone] or [facility] [phone].    NAM phoned [facility] and left a voicemail message (Ms. Jaquez?) for someone to call back about the IL and E&D Waivers.

## 2022-10-03 ENCOUNTER — OFFICE VISIT (OUTPATIENT)
Dept: NEUROLOGY | Facility: CLINIC | Age: 27
End: 2022-10-03
Payer: COMMERCIAL

## 2022-10-03 VITALS
BODY MASS INDEX: 17.39 KG/M2 | HEART RATE: 90 BPM | DIASTOLIC BLOOD PRESSURE: 68 MMHG | SYSTOLIC BLOOD PRESSURE: 100 MMHG | WEIGHT: 110.81 LBS | HEIGHT: 67 IN

## 2022-10-03 DIAGNOSIS — F06.34 MOOD DISORDER WITH MIXED FEATURES DUE TO GENERAL MEDICAL CONDITION: Primary | ICD-10-CM

## 2022-10-03 DIAGNOSIS — G36.0 NEUROMYELITIS OPTICA: ICD-10-CM

## 2022-10-03 PROCEDURE — 3074F PR MOST RECENT SYSTOLIC BLOOD PRESSURE < 130 MM HG: ICD-10-PCS | Mod: CPTII,S$GLB,, | Performed by: PSYCHIATRY & NEUROLOGY

## 2022-10-03 PROCEDURE — 99999 PR PBB SHADOW E&M-EST. PATIENT-LVL IV: ICD-10-PCS | Mod: PBBFAC,,, | Performed by: PSYCHIATRY & NEUROLOGY

## 2022-10-03 PROCEDURE — 99417 PROLNG OP E/M EACH 15 MIN: CPT | Mod: S$GLB,,, | Performed by: PSYCHIATRY & NEUROLOGY

## 2022-10-03 PROCEDURE — 3074F SYST BP LT 130 MM HG: CPT | Mod: CPTII,S$GLB,, | Performed by: PSYCHIATRY & NEUROLOGY

## 2022-10-03 PROCEDURE — 99999 PR PBB SHADOW E&M-EST. PATIENT-LVL IV: CPT | Mod: PBBFAC,,, | Performed by: PSYCHIATRY & NEUROLOGY

## 2022-10-03 PROCEDURE — 99417 PR PROLONGED SVC, OUTPT, W/WO DIRECT PT CONTACT,  EA ADDTL 15 MIN: ICD-10-PCS | Mod: S$GLB,,, | Performed by: PSYCHIATRY & NEUROLOGY

## 2022-10-03 PROCEDURE — 3008F PR BODY MASS INDEX (BMI) DOCUMENTED: ICD-10-PCS | Mod: CPTII,S$GLB,, | Performed by: PSYCHIATRY & NEUROLOGY

## 2022-10-03 PROCEDURE — 3078F PR MOST RECENT DIASTOLIC BLOOD PRESSURE < 80 MM HG: ICD-10-PCS | Mod: CPTII,S$GLB,, | Performed by: PSYCHIATRY & NEUROLOGY

## 2022-10-03 PROCEDURE — 99215 PR OFFICE/OUTPT VISIT, EST, LEVL V, 40-54 MIN: ICD-10-PCS | Mod: S$GLB,,, | Performed by: PSYCHIATRY & NEUROLOGY

## 2022-10-03 PROCEDURE — 3078F DIAST BP <80 MM HG: CPT | Mod: CPTII,S$GLB,, | Performed by: PSYCHIATRY & NEUROLOGY

## 2022-10-03 PROCEDURE — 3008F BODY MASS INDEX DOCD: CPT | Mod: CPTII,S$GLB,, | Performed by: PSYCHIATRY & NEUROLOGY

## 2022-10-03 PROCEDURE — 99215 OFFICE O/P EST HI 40 MIN: CPT | Mod: S$GLB,,, | Performed by: PSYCHIATRY & NEUROLOGY
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
## 2022-10-06 NOTE — TELEPHONE ENCOUNTER
F/U with pt's mother about three [place] waiver programs that may help Kat[place], in the future: ID/DD Waiver, Elderly and Disabled Waiver, Independent Living Waiver.  Gave her contact information, etc.    She also shared that she spoke with an acquaintance about a program where the acquaintance's daughter, with cerebral palsy, lives.  She's not sure if that person is living there under the ID/DD waiver.       Group [facility] is [facility], main office [phone], [facility] @ [place] [phone] (a separate location).  SW will follow up with this group home provider to learn more.

## 2022-10-10 DIAGNOSIS — R45.87 POOR IMPULSE CONTROL: ICD-10-CM

## 2022-10-10 DIAGNOSIS — F06.34 MOOD DISORDER WITH MIXED FEATURES DUE TO GENERAL MEDICAL CONDITION: ICD-10-CM

## 2022-10-10 DIAGNOSIS — F41.9 ANXIETY: ICD-10-CM

## 2022-10-10 RX ORDER — PROPRANOLOL HYDROCHLORIDE 20 MG/1
TABLET ORAL
Qty: 60 TABLET | Refills: 1 | Status: SHIPPED | OUTPATIENT
Start: 2022-10-10 | End: 2022-10-14 | Stop reason: SDUPTHER

## 2022-10-13 ENCOUNTER — TELEPHONE (OUTPATIENT)
Dept: NEUROLOGY | Facility: CLINIC | Age: 27
End: 2022-10-13

## 2022-10-13 DIAGNOSIS — G36.0 NEUROMYELITIS OPTICA: Primary | ICD-10-CM

## 2022-10-13 NOTE — TELEPHONE ENCOUNTER
Spoke with Ms. Zurita from [facility] regarding services for ID/DD individuals.  She confirmed that pt could qualify for the [place] ID/DD [place]aiver, which SW already relayed to mother.  It does include supported living services/residential services.  Wait list is 10-12 years and she'd only get in sooner if there was a crisis (like caregiver dies, etc).      Another service is the community support program (day services), to help build independence (cook, wash clothes, pre-vocation services, etc) or other activities that are appropriate for her level of ability/functioning.      Residential services are available on their campus, but no beds are open currently.      Mom would need to call [place] to apply for services and they would get records.  They would also perform and evaluation/testing and review her history.  They will recommendations from there.  Yudith Acosta, [phone] ext [phone].    Phoned pt's mother to provide and update and encouraged her to call Ms. Acosta.  Mom said she will call today.

## 2022-10-14 ENCOUNTER — OFFICE VISIT (OUTPATIENT)
Dept: PSYCHIATRY | Facility: CLINIC | Age: 27
End: 2022-10-14
Payer: COMMERCIAL

## 2022-10-14 DIAGNOSIS — F02.818 MAJOR NEUROCOGNITIVE DISORDER DUE TO ANOTHER MEDICAL CONDITION WITH BEHAVIORAL DISTURBANCE: ICD-10-CM

## 2022-10-14 DIAGNOSIS — F06.34 MOOD DISORDER WITH MIXED FEATURES DUE TO GENERAL MEDICAL CONDITION: Primary | ICD-10-CM

## 2022-10-14 DIAGNOSIS — F12.90 MARIJUANA USE: ICD-10-CM

## 2022-10-14 DIAGNOSIS — R45.87 POOR IMPULSE CONTROL: ICD-10-CM

## 2022-10-14 DIAGNOSIS — F41.9 ANXIETY: ICD-10-CM

## 2022-10-14 PROCEDURE — 1159F PR MEDICATION LIST DOCUMENTED IN MEDICAL RECORD: ICD-10-PCS | Mod: CPTII,95,, | Performed by: NURSE PRACTITIONER

## 2022-10-14 PROCEDURE — 1159F MED LIST DOCD IN RCRD: CPT | Mod: CPTII,95,, | Performed by: NURSE PRACTITIONER

## 2022-10-14 PROCEDURE — 3044F PR MOST RECENT HEMOGLOBIN A1C LEVEL <7.0%: ICD-10-PCS | Mod: CPTII,95,, | Performed by: NURSE PRACTITIONER

## 2022-10-14 PROCEDURE — 99999 PR PBB SHADOW E&M-EST. PATIENT-LVL II: ICD-10-PCS | Mod: PBBFAC,,, | Performed by: NURSE PRACTITIONER

## 2022-10-14 PROCEDURE — 99999 PR PBB SHADOW E&M-EST. PATIENT-LVL II: CPT | Mod: PBBFAC,,, | Performed by: NURSE PRACTITIONER

## 2022-10-14 PROCEDURE — 1160F PR REVIEW ALL MEDS BY PRESCRIBER/CLIN PHARMACIST DOCUMENTED: ICD-10-PCS | Mod: CPTII,95,, | Performed by: NURSE PRACTITIONER

## 2022-10-14 PROCEDURE — 99214 OFFICE O/P EST MOD 30 MIN: CPT | Mod: 95,,, | Performed by: NURSE PRACTITIONER

## 2022-10-14 PROCEDURE — 3044F HG A1C LEVEL LT 7.0%: CPT | Mod: CPTII,95,, | Performed by: NURSE PRACTITIONER

## 2022-10-14 PROCEDURE — 1160F RVW MEDS BY RX/DR IN RCRD: CPT | Mod: CPTII,95,, | Performed by: NURSE PRACTITIONER

## 2022-10-14 PROCEDURE — 99214 PR OFFICE/OUTPT VISIT, EST, LEVL IV, 30-39 MIN: ICD-10-PCS | Mod: 95,,, | Performed by: NURSE PRACTITIONER

## 2022-10-14 RX ORDER — BENZTROPINE MESYLATE 1 MG/1
1 TABLET ORAL 2 TIMES DAILY
Qty: 180 TABLET | Refills: 1 | Status: SHIPPED | OUTPATIENT
Start: 2022-10-14 | End: 2023-04-12

## 2022-10-14 RX ORDER — HALOPERIDOL 5 MG/1
5 TABLET ORAL 2 TIMES DAILY
Qty: 180 TABLET | Refills: 1 | Status: SHIPPED | OUTPATIENT
Start: 2022-10-14 | End: 2023-04-12

## 2022-10-14 RX ORDER — PROPRANOLOL HYDROCHLORIDE 20 MG/1
TABLET ORAL
Qty: 90 TABLET | Refills: 1 | Status: SHIPPED | OUTPATIENT
Start: 2022-10-14

## 2022-10-14 RX ORDER — SERTRALINE HYDROCHLORIDE 50 MG/1
50 TABLET, FILM COATED ORAL DAILY
Qty: 90 TABLET | Refills: 1 | Status: SHIPPED | OUTPATIENT
Start: 2022-10-14 | End: 2023-04-28 | Stop reason: SDUPTHER

## 2022-10-14 RX ORDER — PROPRANOLOL HYDROCHLORIDE 10 MG/1
TABLET ORAL
Qty: 90 TABLET | Refills: 1 | Status: SHIPPED | OUTPATIENT
Start: 2022-10-14

## 2022-10-14 NOTE — PROGRESS NOTES
10/14/2022 2:38 PM  Nohemy Ladd  1995  [id number]    Outpatient Psychiatry Follow-Up Visit (MD/NP) - Telemedicine Visit      The patient location is: Patient reported that her location at the time of this visit was in the [facility]         Visit type: audiovisual      Each patient to whom he or she provides medical services by telemedicine is:  (1) informed of the relationship between the physician and patient and the respective role of any other health care provider with respect to management of the patient; and (2) notified that he or she may decline to receive medical services by telemedicine and may withdraw from such care at any time.        Chief Complaint:  Nohemy Ladd, a 27 y.o. female,who presents today for follow up of mood swings, impulsivity, anxiety and substance abuse.  Met with patient and father.        Interval History/Subjective Report/Content of Current Session:     Pt is a 27 y.o. female with a hx of neuromyelitis optica, pseudobulbar affect, and substance induced bipolar sxs.    Since the pt was last seen by me in May 2022, she was dx'd with major neurocog disorder. Her father states that they are trying to get her into some type of residential facility as they were told she needs 24 hour care. He states that it has been very difficult to find a facility. He states the only facilities that are available in [place] are either for the elderly or for children with Autism. States that she may have to switch her medicaid to [place] in order to receive services here. She has lost 4 more pounds but insists that she eats regularly. Her father states she will only eat if food is put in front of her. He states she refuses to drink meal replacement shakes. Current BMI is 17.35.    States they have a follow up appt with Dr. Painting and Dr. Guadarrama in two months.    He states that he and his wife recently purchased a new RV and will be traveling on the road until March. States the pt's mother will  "be caring for her during that time.    She reports her mood is "good". Her father reports they have not been having any problems with impulse control.    The pt reports that she smokes marijuana a couple of times a week, but her father suspects it is more often than that.     Pt denies recurrent thoughts of death and denies SI/HI. Denies any sxs of ksenia. Denies AVH, paranoia and delusions. No objective s/sx of psychosis or ksenia. Denies any ASE from her psych meds.    Denies any episodes of syncope. Pt's father states that she gets up abruptly despite having been told she needs to adjust her positions slowly to avoid vertigo and dizziness. He states that propranolol is the only medication that has adequately controlled the pt's impulsivity.        Psychotherapy:  Target symptoms: mood disorder, agitation and irritability  Why chosen therapy is appropriate versus another modality: relevant to diagnosis, patient responds to this modality  Outcome monitoring methods: self-report, observation, feedback from family  Therapeutic intervention type: supportive psychotherapy  Topics discussed/themes: stress related to medical comorbidities, building skills sets for symptom management, substance abuse  The patient's response to the intervention is accepting. The patient's progress toward treatment goals is limited.   Duration of intervention: 9 minutes.      Psychotropic medication review  Previous Trials-  Prozac  Abilify   Lithium  Elavil  Hydroxyzine     Current meds-  Haldol  Zoloft  Cogentin  Propranolol  Gabapentin          Review of Systems       Review of Systems   Constitutional:  Negative for chills, fever and malaise/fatigue.   Respiratory:  Negative for cough and shortness of breath.    Cardiovascular:  Negative for chest pain and palpitations.   Gastrointestinal:  Positive for nausea and vomiting. Negative for abdominal pain and diarrhea.   Musculoskeletal:  Negative for falls and myalgias.   Skin:  Negative for " rash.   Neurological:  Negative for tremors, seizures and headaches.   Psychiatric/Behavioral:          See HPI       Past Medical, Family and Social History: The patient's past medical, family and social history, allergies, current medications, past surgical history, and problem list have been reviewed and updated as appropriate within the electronic medical record.    Compliance: yes    Side effects: see above    Risk Parameters:  Patient reports no suicidal ideation  Patient reports no homicidal ideation  Patient reports no self-injurious behavior  Patient reports no violent behavior    Exam (detailed: at least 9 elements; comprehensive: all 15 elements)   Constitutional  Vitals:  Most recent vital signs, dated greater than 90 days prior to this appointment, were reviewed.   There were no vitals filed for this visit.     General:  unremarkable, age appropriate, well nourished, casually dressed, neatly groomed, thin     Musculoskeletal  Muscle Strength/Tone:  not examined - JEANNETTE due to virtual visit   Gait & Station:  JEANNETTE due to virtual visit     Psychiatric      Appearance:  unremarkable, age appropriate, well nourished, casually dressed, neatly groomed, thin   Behavior:  normal, friendly and cooperative, eye contact normal     Speech:  no latency; no press   Mood & Affect:  euthymic  congruent and appropriate   Thought Process:  normal and logical   Associations:  intact   Thought Content:  normal, no suicidality, no homicidality, delusions, or paranoia   Insight:  intact, poor awareness of illness   Judgement: behavior is adequate to circumstances   Orientation:  grossly intact   Memory: impaired due to problems with memory   Language: grossly intact   Attention Span & Concentration:  able to focus   Fund of Knowledge:  intact and appropriate to age and level of education     Medications:  Outpatient Encounter Medications as of 10/14/2022   Medication Sig Dispense Refill    benztropine (COGENTIN) 1 MG tablet  TAKE 1 TABLET BY MOUTH 2 TIMES DAILY. 180 tablet 0    cholecalciferol, vitamin D3, (VITAMIN D3) 25 mcg (1,000 unit) capsule Take 1,000 Units by mouth once daily.      cyanocobalamin (VITAMIN B-12) 1000 MCG tablet Take 100 mcg by mouth once daily.      docusate sodium (COLACE) 100 MG capsule Take 10 mg by mouth 2 (two) times daily.       gabapentin (NEURONTIN) 100 MG capsule TAKE 1 CAPSULE BY MOUTH TWICE DAILY, ALONG WITH  MG TWICE DAILY, FOR A TOTAL  MG DAILY 60 capsule 0    gabapentin (NEURONTIN) 300 MG capsule TAKE 1 CAPSULE (300 MG TOTAL) BY MOUTH 2 (TWO) TIMES DAILY. TAKE WITH 100 MG CAPSULE TWICE DAILY FOR A TOTAL  MG DAILY. 60 capsule 0    haloperidoL (HALDOL) 5 MG tablet TAKE 1 TABLET BY MOUTH 2 TIMES DAILY. 180 tablet 0    multivitamin (THERAGRAN) per tablet Take 1 tablet by mouth once daily.      omeprazole (PRILOSEC) 40 MG capsule Take 20 mg by mouth once daily.      propranoloL (INDERAL) 20 MG tablet Take 1.5 tablets (30 mg) q am and 1/2 tablet (10 mg) q hs 60 tablet 1    sertraline (ZOLOFT) 50 MG tablet TAKE 1 TABLET BY MOUTH ONCE DAILY. 30 tablet 0    [DISCONTINUED] propranoloL (INDERAL) 20 MG tablet Take 1.5 tablets (30 mg) q am and 1/2 tablet (10 mg) q hs 60 tablet 1     No facility-administered encounter medications on file as of 10/14/2022.       Allergy:  Review of patient's allergies indicates:   Allergen Reactions    Hydromorphone Shortness Of Breath    Pcn [penicillins] Hives    Codeine          Assessment and Diagnosis   Status/Progress: Based on the examination today, the patient's problem(s) is/are adequately but not ideally controlled.  New problems have not been presented today.   Co-morbidities are complicating management of the primary condition.         General Impression:       ICD-10-CM ICD-9-CM   1. 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 Anxiety  F41.9 300.00   3. Poor impulse control  R45.87 312.30   4. Major  neurocognitive disorder due to another medical condition with behavioral disturbance  F02.818 294.9   5. Marijuana use  F12.90 305.20       Intervention/Counseling/Treatment Plan     Medication Management:  Continue Haldol 5 mg BID  Continue Zoloft 50 mg q day  Continue Gabapentin 400 mg BID (takes a 300 mg cap with a 100 mg cap BID)  Continue Cogentin 1 mg BID  Continue propranolol 20 mg q am and 10 mg q hs  Will consider adding Risperdal in the future if needed.  Labs: most recent labs reviewed  The treatment plan and follow up plan were reviewed with the patient.  Discussed with patient informed consent, risks vs. benefits, alternative treatments, side effect profile and the inherent unpredictability of individual responses to these treatments. The patient expresses understanding of the above and displays the capacity to agree with this current plan and had no other questions.  Encouraged Patient to keep future appointments.   Take medications as prescribed and abstain from substance abuse.   Pt was told to present to ED or call 911 for SI/HI plan or intent, psychosis, or other psychiatric or medical emergency, and pt verbalized understanding.        Return to Clinic: 2 months        Face-to-face time with patient:  34 minutes  Total time:  40 minutes of total time spent on the encounter, which includes face to face time and non-face to face time preparing to see the patient (eg, review of tests), Obtaining and/or reviewing separately obtained history, Documenting clinical information in the electronic or other health record, Independently interpreting results (not separately reported) and communicating results to the patient/family/caregiver, or Care coordination (not separately reported).       Gerda Dinero, MSN, APRN, PMHNP-BC [facility]

## 2022-10-17 ENCOUNTER — TELEPHONE (OUTPATIENT)
Dept: PSYCHIATRY | Facility: CLINIC | Age: 27
End: 2022-10-17
Payer: COMMERCIAL

## 2022-10-17 NOTE — TELEPHONE ENCOUNTER
Sent MIKE, POA, and medical records to Yudith Acosta at [facility] in [place] for pt's application for services (in-home and residential).

## 2022-10-20 ENCOUNTER — DOCUMENTATION ONLY (OUTPATIENT)
Dept: NEUROLOGY | Facility: CLINIC | Age: 27
End: 2022-10-20
Payer: COMMERCIAL

## 2022-10-21 DIAGNOSIS — G36.0 NEUROMYELITIS OPTICA: Primary | ICD-10-CM

## 2022-10-25 ENCOUNTER — LAB VISIT (OUTPATIENT)
Dept: LAB | Facility: HOSPITAL | Age: 27
End: 2022-10-25
Attending: PSYCHIATRY & NEUROLOGY
Payer: COMMERCIAL

## 2022-10-25 DIAGNOSIS — G36.0 NEUROMYELITIS OPTICA: ICD-10-CM

## 2022-10-25 LAB
BASOPHILS # BLD AUTO: 0.07 K/UL (ref 0–0.2)
BASOPHILS NFR BLD: 1 % (ref 0–1.9)
DIFFERENTIAL METHOD: ABNORMAL
EOSINOPHIL # BLD AUTO: 0.1 K/UL (ref 0–0.5)
EOSINOPHIL NFR BLD: 1 % (ref 0–8)
ERYTHROCYTE [DISTWIDTH] IN BLOOD BY AUTOMATED COUNT: 12.5 % (ref 11.5–14.5)
HCT VFR BLD AUTO: 37.8 % (ref 37–48.5)
HGB BLD-MCNC: 11.9 G/DL (ref 12–16)
IMM GRANULOCYTES # BLD AUTO: 0.12 K/UL (ref 0–0.04)
IMM GRANULOCYTES NFR BLD AUTO: 1.7 % (ref 0–0.5)
LYMPHOCYTES # BLD AUTO: 3.2 K/UL (ref 1–4.8)
LYMPHOCYTES NFR BLD: 44.4 % (ref 18–48)
MCH RBC QN AUTO: 28.7 PG (ref 27–31)
MCHC RBC AUTO-ENTMCNC: 31.5 G/DL (ref 32–36)
MCV RBC AUTO: 91 FL (ref 82–98)
MONOCYTES # BLD AUTO: 0.7 K/UL (ref 0.3–1)
MONOCYTES NFR BLD: 10.1 % (ref 4–15)
NEUTROPHILS # BLD AUTO: 3 K/UL (ref 1.8–7.7)
NEUTROPHILS NFR BLD: 41.8 % (ref 38–73)
NRBC BLD-RTO: 0 /100 WBC
PLATELET # BLD AUTO: 281 K/UL (ref 150–450)
PMV BLD AUTO: 10.3 FL (ref 9.2–12.9)
RBC # BLD AUTO: 4.15 M/UL (ref 4–5.4)
WBC # BLD AUTO: 7.21 K/UL (ref 3.9–12.7)

## 2022-10-25 PROCEDURE — 85025 COMPLETE CBC W/AUTO DIFF WBC: CPT | Performed by: PSYCHIATRY & NEUROLOGY

## 2022-10-25 PROCEDURE — 86706 HEP B SURFACE ANTIBODY: CPT | Mod: 91 | Performed by: PSYCHIATRY & NEUROLOGY

## 2022-10-25 PROCEDURE — 36415 COLL VENOUS BLD VENIPUNCTURE: CPT | Mod: PO | Performed by: PSYCHIATRY & NEUROLOGY

## 2022-10-25 PROCEDURE — 82784 ASSAY IGA/IGD/IGG/IGM EACH: CPT | Mod: 59 | Performed by: PSYCHIATRY & NEUROLOGY

## 2022-10-25 PROCEDURE — 86704 HEP B CORE ANTIBODY TOTAL: CPT | Performed by: PSYCHIATRY & NEUROLOGY

## 2022-10-25 PROCEDURE — 87340 HEPATITIS B SURFACE AG IA: CPT | Performed by: PSYCHIATRY & NEUROLOGY

## 2022-10-25 PROCEDURE — 88184 FLOWCYTOMETRY/ TC 1 MARKER: CPT | Performed by: PSYCHIATRY & NEUROLOGY

## 2022-10-26 LAB
HBV CORE AB SERPL QL IA: NORMAL
HBV SURFACE AB SER-ACNC: <3 MIU/ML
HBV SURFACE AB SER-ACNC: NORMAL M[IU]/ML
HBV SURFACE AG SERPL QL IA: NORMAL
IGA SERPL-MCNC: 68 MG/DL (ref 40–350)
IGG SERPL-MCNC: 686 MG/DL (ref 650–1600)
IGM SERPL-MCNC: 10 MG/DL (ref 50–300)

## 2022-10-27 LAB
PATH REPORT.FINAL DX SPEC: NORMAL
RITUXAN SENSITIVITY (CD20): NORMAL

## 2022-11-01 ENCOUNTER — PATIENT MESSAGE (OUTPATIENT)
Dept: NEUROLOGY | Facility: CLINIC | Age: 27
End: 2022-11-01
Payer: COMMERCIAL

## 2022-11-11 ENCOUNTER — INFUSION (OUTPATIENT)
Dept: INFUSION THERAPY | Facility: HOSPITAL | Age: 27
End: 2022-11-11
Attending: PSYCHIATRY & NEUROLOGY
Payer: COMMERCIAL

## 2022-11-11 VITALS
RESPIRATION RATE: 18 BRPM | WEIGHT: 118.5 LBS | HEART RATE: 89 BPM | BODY MASS INDEX: 18.6 KG/M2 | DIASTOLIC BLOOD PRESSURE: 59 MMHG | SYSTOLIC BLOOD PRESSURE: 95 MMHG | TEMPERATURE: 98 F | HEIGHT: 67 IN

## 2022-11-11 DIAGNOSIS — G36.0 NEUROMYELITIS OPTICA: Primary | ICD-10-CM

## 2022-11-11 PROCEDURE — 96367 TX/PROPH/DG ADDL SEQ IV INF: CPT | Mod: PN

## 2022-11-11 PROCEDURE — 25000003 PHARM REV CODE 250: Mod: PN | Performed by: PSYCHIATRY & NEUROLOGY

## 2022-11-11 PROCEDURE — 96415 CHEMO IV INFUSION ADDL HR: CPT | Mod: PN

## 2022-11-11 PROCEDURE — 63600175 PHARM REV CODE 636 W HCPCS: Mod: JG,PN | Performed by: PSYCHIATRY & NEUROLOGY

## 2022-11-11 PROCEDURE — 96413 CHEMO IV INFUSION 1 HR: CPT | Mod: PN

## 2022-11-11 PROCEDURE — 96375 TX/PRO/DX INJ NEW DRUG ADDON: CPT | Mod: PN

## 2022-11-11 RX ORDER — ACETAMINOPHEN 325 MG/1
650 TABLET ORAL
Status: CANCELLED | OUTPATIENT
Start: 2023-04-14

## 2022-11-11 RX ORDER — FAMOTIDINE 10 MG/ML
20 INJECTION INTRAVENOUS
Status: COMPLETED | OUTPATIENT
Start: 2022-11-11 | End: 2022-11-11

## 2022-11-11 RX ORDER — SODIUM CHLORIDE 0.9 % (FLUSH) 0.9 %
10 SYRINGE (ML) INJECTION
Status: DISCONTINUED | OUTPATIENT
Start: 2022-11-11 | End: 2022-11-11 | Stop reason: HOSPADM

## 2022-11-11 RX ORDER — SODIUM CHLORIDE 0.9 % (FLUSH) 0.9 %
10 SYRINGE (ML) INJECTION
Status: CANCELLED | OUTPATIENT
Start: 2023-04-14

## 2022-11-11 RX ORDER — HEPARIN 100 UNIT/ML
500 SYRINGE INTRAVENOUS
Status: CANCELLED | OUTPATIENT
Start: 2023-04-14

## 2022-11-11 RX ORDER — FAMOTIDINE 10 MG/ML
20 INJECTION INTRAVENOUS
Status: CANCELLED | OUTPATIENT
Start: 2023-04-14

## 2022-11-11 RX ORDER — ACETAMINOPHEN 325 MG/1
650 TABLET ORAL
Status: COMPLETED | OUTPATIENT
Start: 2022-11-11 | End: 2022-11-11

## 2022-11-11 RX ADMIN — DIPHENHYDRAMINE HYDROCHLORIDE 50 MG: 50 INJECTION, SOLUTION INTRAMUSCULAR; INTRAVENOUS at 11:11

## 2022-11-11 RX ADMIN — SODIUM CHLORIDE: 0.9 INJECTION, SOLUTION INTRAVENOUS at 11:11

## 2022-11-11 RX ADMIN — ACETAMINOPHEN 650 MG: 325 TABLET, FILM COATED ORAL at 11:11

## 2022-11-11 RX ADMIN — FAMOTIDINE 20 MG: 10 INJECTION INTRAVENOUS at 11:11

## 2022-11-11 RX ADMIN — DEXTROSE: 50 INJECTION, SOLUTION INTRAVENOUS at 12:11

## 2022-11-11 RX ADMIN — RITUXIMAB 1000 MG: 10 INJECTION, SOLUTION INTRAVENOUS at 12:11

## 2022-11-12 NOTE — PLAN OF CARE
Problem: Adult Inpatient Plan of Care  Goal: Patient-Specific Goal (Individualized)  Outcome: Ongoing, Progressing  Flowsheets (Taken 11/11/2022 1630)  Anxieties, Fears or Concerns: None  Individualized Care Needs: None  Patient-Specific Goals (Include Timeframe): Infection prevention during treatment.     Problem: Fatigue  Goal: Improved Activity Tolerance  Intervention: Promote Improved Energy  Flowsheets (Taken 11/11/2022 1630)  Fatigue Management:   activity schedule adjusted   frequent rest breaks encouraged   paced activity encouraged   fatigue-related activity identified  Sleep/Rest Enhancement:   regular sleep/rest pattern promoted   relaxation techniques promoted  Activity Management:   Ambulated -L4   Ambulated in maher - L4     Problem: Adult Inpatient Plan of Care  Goal: Plan of Care Review  Outcome: Ongoing, Progressing  Flowsheets (Taken 11/11/2022 1630)  Plan of Care Reviewed With: patient  Tolerated treatment with no known distress.  Ambulated from infusion center with steady gait.

## 2022-12-20 ENCOUNTER — TELEPHONE (OUTPATIENT)
Dept: NEUROLOGY | Facility: CLINIC | Age: 27
End: 2022-12-20
Payer: COMMERCIAL

## 2022-12-21 ENCOUNTER — OFFICE VISIT (OUTPATIENT)
Dept: NEUROLOGY | Facility: CLINIC | Age: 27
End: 2022-12-21
Payer: COMMERCIAL

## 2022-12-21 VITALS
BODY MASS INDEX: 18.67 KG/M2 | WEIGHT: 118.94 LBS | SYSTOLIC BLOOD PRESSURE: 103 MMHG | DIASTOLIC BLOOD PRESSURE: 74 MMHG | HEIGHT: 67 IN | HEART RATE: 99 BPM

## 2022-12-21 DIAGNOSIS — M79.2 NEUROPATHIC PAIN: Primary | ICD-10-CM

## 2022-12-21 DIAGNOSIS — F12.20 CANNABIS USE DISORDER, MODERATE, DEPENDENCE: ICD-10-CM

## 2022-12-21 DIAGNOSIS — F41.9 ANXIETY: ICD-10-CM

## 2022-12-21 DIAGNOSIS — F02.818 MAJOR NEUROCOGNITIVE DISORDER DUE TO ANOTHER MEDICAL CONDITION WITH BEHAVIORAL DISTURBANCE: Primary | ICD-10-CM

## 2022-12-21 DIAGNOSIS — D84.9 IMMUNOCOMPROMISED: ICD-10-CM

## 2022-12-21 DIAGNOSIS — Z71.89 COUNSELING REGARDING GOALS OF CARE: ICD-10-CM

## 2022-12-21 DIAGNOSIS — G36.0 NEUROMYELITIS OPTICA: ICD-10-CM

## 2022-12-21 DIAGNOSIS — F06.34 MOOD DISORDER WITH MIXED FEATURES DUE TO GENERAL MEDICAL CONDITION: ICD-10-CM

## 2022-12-21 DIAGNOSIS — R45.87 POOR IMPULSE CONTROL: ICD-10-CM

## 2022-12-21 PROCEDURE — 1159F MED LIST DOCD IN RCRD: CPT | Mod: CPTII,S$GLB,, | Performed by: PSYCHIATRY & NEUROLOGY

## 2022-12-21 PROCEDURE — 96116 PR NEUROBEHAVIORAL STATUS EXAM BY PSYCH/PHYS: ICD-10-PCS | Mod: S$GLB,,, | Performed by: CLINICAL NEUROPSYCHOLOGIST

## 2022-12-21 PROCEDURE — 3078F PR MOST RECENT DIASTOLIC BLOOD PRESSURE < 80 MM HG: ICD-10-PCS | Mod: CPTII,S$GLB,, | Performed by: PSYCHIATRY & NEUROLOGY

## 2022-12-21 PROCEDURE — 99215 OFFICE O/P EST HI 40 MIN: CPT | Mod: S$GLB,,, | Performed by: PSYCHIATRY & NEUROLOGY

## 2022-12-21 PROCEDURE — 1160F RVW MEDS BY RX/DR IN RCRD: CPT | Mod: CPTII,S$GLB,, | Performed by: PSYCHIATRY & NEUROLOGY

## 2022-12-21 PROCEDURE — 3078F DIAST BP <80 MM HG: CPT | Mod: CPTII,S$GLB,, | Performed by: PSYCHIATRY & NEUROLOGY

## 2022-12-21 PROCEDURE — 3008F PR BODY MASS INDEX (BMI) DOCUMENTED: ICD-10-PCS | Mod: CPTII,S$GLB,, | Performed by: PSYCHIATRY & NEUROLOGY

## 2022-12-21 PROCEDURE — 99999 PR PBB SHADOW E&M-EST. PATIENT-LVL II: ICD-10-PCS | Mod: PBBFAC,,, | Performed by: CLINICAL NEUROPSYCHOLOGIST

## 2022-12-21 PROCEDURE — 99215 PR OFFICE/OUTPT VISIT, EST, LEVL V, 40-54 MIN: ICD-10-PCS | Mod: S$GLB,,, | Performed by: PSYCHIATRY & NEUROLOGY

## 2022-12-21 PROCEDURE — 1159F PR MEDICATION LIST DOCUMENTED IN MEDICAL RECORD: ICD-10-PCS | Mod: CPTII,S$GLB,, | Performed by: PSYCHIATRY & NEUROLOGY

## 2022-12-21 PROCEDURE — 99999 PR PBB SHADOW E&M-EST. PATIENT-LVL II: CPT | Mod: PBBFAC,,, | Performed by: CLINICAL NEUROPSYCHOLOGIST

## 2022-12-21 PROCEDURE — 3074F PR MOST RECENT SYSTOLIC BLOOD PRESSURE < 130 MM HG: ICD-10-PCS | Mod: CPTII,S$GLB,, | Performed by: PSYCHIATRY & NEUROLOGY

## 2022-12-21 PROCEDURE — 99499 UNLISTED E&M SERVICE: CPT | Mod: S$GLB,,, | Performed by: CLINICAL NEUROPSYCHOLOGIST

## 2022-12-21 PROCEDURE — 99999 PR PBB SHADOW E&M-EST. PATIENT-LVL IV: ICD-10-PCS | Mod: PBBFAC,,, | Performed by: PSYCHIATRY & NEUROLOGY

## 2022-12-21 PROCEDURE — 3008F BODY MASS INDEX DOCD: CPT | Mod: CPTII,S$GLB,, | Performed by: PSYCHIATRY & NEUROLOGY

## 2022-12-21 PROCEDURE — 1160F PR REVIEW ALL MEDS BY PRESCRIBER/CLIN PHARMACIST DOCUMENTED: ICD-10-PCS | Mod: CPTII,S$GLB,, | Performed by: PSYCHIATRY & NEUROLOGY

## 2022-12-21 PROCEDURE — 99999 PR PBB SHADOW E&M-EST. PATIENT-LVL IV: CPT | Mod: PBBFAC,,, | Performed by: PSYCHIATRY & NEUROLOGY

## 2022-12-21 PROCEDURE — 99499 NO LOS: ICD-10-PCS | Mod: S$GLB,,, | Performed by: CLINICAL NEUROPSYCHOLOGIST

## 2022-12-21 PROCEDURE — 3074F SYST BP LT 130 MM HG: CPT | Mod: CPTII,S$GLB,, | Performed by: PSYCHIATRY & NEUROLOGY

## 2022-12-21 PROCEDURE — 96116 NUBHVL XM PHYS/QHP 1ST HR: CPT | Mod: S$GLB,,, | Performed by: CLINICAL NEUROPSYCHOLOGIST

## 2022-12-21 RX ORDER — GABAPENTIN 600 MG/1
600 TABLET ORAL 2 TIMES DAILY
Qty: 60 TABLET | Refills: 11 | Status: SHIPPED | OUTPATIENT
Start: 2022-12-21 | End: 2023-12-21

## 2022-12-21 RX ORDER — TOPIRAMATE 25 MG/1
25 TABLET ORAL DAILY
Qty: 30 TABLET | Refills: 11 | Status: SHIPPED | OUTPATIENT
Start: 2022-12-21 | End: 2023-03-31 | Stop reason: SDUPTHER

## 2022-12-21 NOTE — PATIENT INSTRUCTIONS
[email or web address]    [email or web address]

## 2022-12-21 NOTE — PROGRESS NOTES
Subjective:       Patient ID: Nohemy Ladd is a 27 y.o. female who presents today for a routine clinic visit for NMO. The history has been provided by the patient. she is accompanied by her mother  .    NMO HPI:  DMT: Rituxan -- November, [place]  Side effects from DMT? No  Taking vitamin D3 as recommended? Yes -  She's gained 3 pounds since August.   Having vertigo and syncope   Continues to smoke cannabis daily.    Lives in [facility].    Denies any sudden neurologic decline.  Continues to have significant cognitive dysfunction.     SOCIAL HISTORY  Social History     Tobacco Use    Smoking status: Every Day     Packs/day: 0.25     Types: Cigarettes    Smokeless tobacco: Never   Substance Use Topics    Alcohol use: Not Currently    Drug use: Yes     Types: Marijuana     Comment: last smoked marijuana last week             Objective:        1. 25 foot timed walk: 4.8s without assist    Neurologic Exam    MENTAL STATUS: grossly intact; expansive affect  CRANIAL NERVE EXAM: There is no internuclear ophthalmoplegia. Extraocular   muscles are intact.  No facial   asymmetry.There is no dysarthria.   MOTOR EXAM: Normal bulk and tone throughout UE and LE bilaterally. Rapid sequential movements are normal. Strength is 5/5 in all groups   in the lower extremities and upper extremities.   REFLEXES: Symmetric and 2+ throughout in all 4 extremities.   SENSORY EXAM: Normal to vibration t/o  COORDINATION: Normal finger-to-nose exam.   GAIT: Narrow based and stable.         Imaging:   MRI Brain 8/2022 grossly stable from prior; she does have some cortical and hippocampal atrophy that has developed since 2016    Lab Results   Component Value Date    [id number] 43 04/13/2022    [id number] 34 08/19/2020    [id number] 26 (L) 08/13/2019     No results found for: JCVINDEX, JCVANTIBODY  No results found for: HE9RNTUX, ABSOLUTECD3, ZD4CUSZT, ABSOLUTECD8, PY6DNDUD, ABSOLUTECD4, LABCD48    Diagnosis/Assessment/Plan:     1. Neuromyelitis Optica  Assessment: NMO is stable, but she has progressive cognitive decline and severe nausea that I do not think is direct result of NMO.  We have worked up reversible causes of this and w/u unrevealing. I suspect cannabis use may underlie some of these changes.  We discussed today, and pt expresses some willingness to try to stop, but I doubt possible for this to happen given her current living situation.  In her individual case, I think a residential treatment program is indicated.  I've started low dose topamax and also gabapentin today as both have evidence of benefit in cannabis use disorder.   Disease Modifying Therapies: continue Ritiximab    F/u K[facility] Pes[facility] in 3mo   Neuropathic pain  -     gabapentin (NEURONTIN) 600 MG tablet; Take 1 tablet (600 mg total) by mouth 2 (two) times daily.  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  Dispense: 60 tablet; Refill: 11    Poor impulse control    Anxiety    Cannabis use disorder, moderate, dependence  -     topiramate (TOPAMAX) 25 MG tablet; Take 1 tablet (25 mg total) by mouth once daily.  Dispense: 30 tablet; Refill: 11    Counseling regarding goals of care    Neuromyelitis optica    Immunocompromised        I spent a total of 40 minutes on the day of the visit.This includes face to face time and non-face to face time preparing to see the patient (eg, review of tests), obtaining and/or reviewing separately obtained history, documenting clinical information in the electronic or other health record, independently interpreting results and communicating results to the patient/family/caregiver, or care coordinator.

## 2022-12-22 ENCOUNTER — PATIENT MESSAGE (OUTPATIENT)
Dept: NEUROLOGY | Facility: CLINIC | Age: 27
End: 2022-12-22
Payer: COMMERCIAL

## 2023-01-05 NOTE — ASSESSMENT & PLAN NOTE
Assessment:  -Initial attempts at determining her eligibility for public assistance in [place] were successful.  But after a lengthy eligibility review, the waiver program determined that she was not eligible.  This has resulted in substantial worry and stress from the family.   -Family is still working on a plan for her care as she cannot reliably live alone and her current situation is not sustainable.   -Fortunately, she is less agitated with family and that reduces friction when they care for her  Plan:  -ongoing social work and [place] clinic follow-up  -neuropsych in 12-months to assess cognition  -ongoing psychiatry follow-up  -I am appealing for them around state disability

## 2023-01-06 ENCOUNTER — TELEPHONE (OUTPATIENT)
Dept: NEUROLOGY | Facility: CLINIC | Age: 28
End: 2023-01-06
Payer: COMMERCIAL

## 2023-01-06 ENCOUNTER — TELEPHONE (OUTPATIENT)
Dept: PSYCHIATRY | Facility: CLINIC | Age: 28
End: 2023-01-06
Payer: COMMERCIAL

## 2023-01-06 NOTE — TELEPHONE ENCOUNTER
NAM spoke with neuropsychologist, Dr. Painting, regarding pt's denial for residential services at [facility].  SW agreed to assist pt's mother with a referral to the Office of Special Disability Programs and to discuss additional recommendations shared by Dr. Painting.      Phoned mother and LVM for her to call.

## 2023-01-09 NOTE — TELEPHONE ENCOUNTER
Emailed referral to the Office of Special Disability Programs with release from pt's POA, mother Donna Ladd.  Scanned both documents to chart. Mother copied on the email.

## 2023-03-06 NOTE — TELEPHONE ENCOUNTER
----- Message from Brisa Abel sent at 10/22/2018  8:37 AM CDT -----  Contact: Tanner (father) @ [phone]  Calling to reschedule pts chemo appt.  Would like to have the appt scheduled this week.  Pls call.    Pt was seen and examined. Agree with the above

## 2023-03-21 NOTE — PROGRESS NOTES
"Subjective:       Patient ID: Nohemy Ladd is a 28 y.o. female who presents today for a routine virtual visit for NMO. The history has been provided by the patient.    The patient location is: her grandmother's house   The chief complaint leading to consultation is: NMO    Visit type: audiovisual  Face to Face time with patient: 35 minutes   40 minutes of total time spent on the encounter, which includes face to face time and non-face to face time preparing to see the patient (eg, review of tests), Obtaining and/or reviewing separately obtained history, Documenting clinical information in the electronic or other health record, Independently interpreting results (not separately reported) and communicating results to the patient/family/caregiver, or Care coordination (not separately reported).     Each patient to whom he or she provides medical services by telemedicine is:  (1) informed of the relationship between the physician and patient and the respective role of any other health care provider with respect to management of the patient; and (2) notified that he or she may decline to receive medical services by telemedicine and may withdraw from such care at any time.    NMO HPI:  DMT: Rituxan; last infused in November; due in May   Side effects from DMT? No  Taking vitamin D3 as recommended? Yes -  She is still living in the . She does not like living alone. Her father reports that she spends 90% of her time at a neighbor's house. Her house is "in disarray."   She is drinking 48 soft drinks in a week. She does not drink water.   She has gained "quite a bit of weight." She weighed herself during the visit and reports that she is 128. She was reassured that this is a healthy weight for her.   She is not exercising.   She denies any falls.   She continues to have daily vomiting. This is regardless of meals. Her father describes this as "more gagging and spitting up" vs "true vomiting." He feels like some of this " "is related to stressful situations/anxiety. She has chronic nausea. She reports that she "throws up everything in my stomach." She is sometimes able to make it stop or think it away.   She has cut back on marijuana use about 50%, but this has not had an impact on the vomiting. Her father does not know if this true.  She has dentures now--got these about 2 weeks ago.   She has had more frequent hiccups lately.   She has an itching sensation all over her body, and she has had no relief from the gabapentin.   Her father states "she is not doing well. Her memory is getting worse." She loses train of thought in the middle of a sentence. When prompted, she still doesn't remember. She sometimes does not wear proper clothing or undergarments. Her hygiene is also not good. She, however, reports that her hygiene is fine.   She continues to have weakness--ex) can't lift a case of soft drinks.   She sees herself continuing to live in the .   She continues to see Gerda Kellogg in psychiatry for virtual and in-person visits.   She is "vaping a whole lot." She is not paying for it from the money she gets from her family each month, and she will not discuss where she is getting the money to pay for this.   She feels like she has a good support system.     SOCIAL HISTORY  Social History     Tobacco Use    Smoking status: Every Day     Packs/day: 0.25     Types: Cigarettes    Smokeless tobacco: Never   Substance Use Topics    Alcohol use: Not Currently    Drug use: Yes     Types: Marijuana     Comment: last smoked marijuana last week     Living arrangements - the patient lives alone   Employment: SSDI     NMO ROS:  Fatigue: Yes - Energy level is "not good. I'm always tired."   Sleep Disturbance: No  Bladder Dysfunction: No  Bowel Dysfunction: yes--she varies between constipation. Her father reports that she is not preparing meals for herself. She is only eating junk at home. She states that she never feels hungry.  " "  Spasticity: she has pain in her back  Visual Symptoms: No  Cognitive: Yes   Mood Disorder: Yes - She reports that she has "really bad anxiety." She cannot identify a trigger for her anxiety and feels this way at baseline.   Gait Disturbance: No  Falls: No  Hand Dysfunction: No  Pain: Yes - nerve pain, as above   Sexual Dysfunction: Not Assessed  Skin Breakdown: No  Tremors: No  Dysphagia:  No  Dysarthria:  No  Heat sensitivity:  She is always cold (from being thin...)  Any un-met adaptive needs? No   Copay Assist?  Does not pay OOP for Rituxan     Objective:        1. 25 foot timed walk:    Deferred   Neurologic Exam      Imaging:   No new imaging to review.   Labs:     Lab Results   Component Value Date    [id number] 43 04/13/2022    [id number] 34 08/19/2020    [id number] 26 (L) 08/13/2019         Diagnosis/Assessment/Plan:    1. Neuromyelitis Optica  Assessment: NMO is stable, but Nohemy continues to have a progressive cognitive decline. I do not think that Nohemy is capable of living independently at this time. She would benefit from full-time in-home care or perhaps a group home/facility setting. She is not able to keep her RV in order, manage her medications, prepare meals, etc. Her father also reports that her hygiene is poor at times. She would benefit from a structured setting. I will update her psychiatry and neuropsychology providers on how she is doing.   Disease Modifying Therapies: Continue Rituxan and Vitamin D. She continues to have vomiting regularly, despite reporting that she has cut back on marijuana use.     She will follow up with Dr. Guadarrama in 4 months.         Kasia Doll, [facility] Health[facility]-BC, MSCN    Problem List Items Addressed This Visit          Neurologic Problems    Neuromyelitis optica - Primary    Relevant Orders    CBC Auto Differential    Comprehensive Metabolic Panel    Hepatitis B Core Antibody, Total    Hepatitis B Surface Antigen    Immunoglobulins (IgG, IgA, IgM) Quantitative    " Major neurocognitive disorder due to another medical condition with behavioral disturbance       Other    Chronic vomiting     Other Visit Diagnoses       Counseling regarding goals of care        Prophylactic immunotherapy        High risk medication use

## 2023-03-23 ENCOUNTER — OFFICE VISIT (OUTPATIENT)
Dept: NEUROLOGY | Facility: CLINIC | Age: 28
End: 2023-03-23
Payer: COMMERCIAL

## 2023-03-23 ENCOUNTER — PATIENT MESSAGE (OUTPATIENT)
Dept: NEUROLOGY | Facility: CLINIC | Age: 28
End: 2023-03-23

## 2023-03-23 DIAGNOSIS — Z79.899 HIGH RISK MEDICATION USE: ICD-10-CM

## 2023-03-23 DIAGNOSIS — Z71.89 COUNSELING REGARDING GOALS OF CARE: ICD-10-CM

## 2023-03-23 DIAGNOSIS — Z29.89 PROPHYLACTIC IMMUNOTHERAPY: ICD-10-CM

## 2023-03-23 DIAGNOSIS — R11.10 CHRONIC VOMITING: ICD-10-CM

## 2023-03-23 DIAGNOSIS — F02.818 MAJOR NEUROCOGNITIVE DISORDER DUE TO ANOTHER MEDICAL CONDITION WITH BEHAVIORAL DISTURBANCE: ICD-10-CM

## 2023-03-23 DIAGNOSIS — G36.0 NEUROMYELITIS OPTICA: Primary | ICD-10-CM

## 2023-03-23 PROCEDURE — 1159F MED LIST DOCD IN RCRD: CPT | Mod: CPTII,95,, | Performed by: CLINICAL NURSE SPECIALIST

## 2023-03-23 PROCEDURE — 1159F PR MEDICATION LIST DOCUMENTED IN MEDICAL RECORD: ICD-10-PCS | Mod: CPTII,95,, | Performed by: CLINICAL NURSE SPECIALIST

## 2023-03-23 PROCEDURE — 99215 PR OFFICE/OUTPT VISIT, EST, LEVL V, 40-54 MIN: ICD-10-PCS | Mod: GT,,, | Performed by: CLINICAL NURSE SPECIALIST

## 2023-03-23 PROCEDURE — 99215 OFFICE O/P EST HI 40 MIN: CPT | Mod: GT,,, | Performed by: CLINICAL NURSE SPECIALIST

## 2023-03-23 NOTE — Clinical Note
Topamax  Labs for March and April in [place]  Rx for omeprazole for cost plus  Something for nausea? Tried phenergan and Zofran with no relief  Reach out to Gerda for increase in Zoloft? She will set an appt

## 2023-03-23 NOTE — Clinical Note
She again talked about vomiting. She says she throws up all of her stomach contents several times a day. Her dad describes this more as gagging and spitting. She can control it or think it away of she tries. She reports hiccups one time a day :)

## 2023-03-25 ENCOUNTER — TELEPHONE (OUTPATIENT)
Dept: NEUROLOGY | Facility: CLINIC | Age: 28
End: 2023-03-25

## 2023-03-25 ENCOUNTER — TELEPHONE (OUTPATIENT)
Dept: NEUROLOGY | Facility: CLINIC | Age: 28
End: 2023-03-25
Payer: COMMERCIAL

## 2023-03-25 DIAGNOSIS — R11.0 CHRONIC NAUSEA: Primary | ICD-10-CM

## 2023-03-25 DIAGNOSIS — G36.0 NEUROMYELITIS OPTICA: Primary | ICD-10-CM

## 2023-03-25 DIAGNOSIS — F12.20 CANNABIS USE DISORDER, MODERATE, DEPENDENCE: ICD-10-CM

## 2023-03-25 RX ORDER — OMEPRAZOLE 20 MG/1
20 CAPSULE, DELAYED RELEASE ORAL DAILY
Qty: 90 CAPSULE | Refills: 1 | Status: SHIPPED | OUTPATIENT
Start: 2023-03-25 | End: 2024-03-24

## 2023-03-25 NOTE — TELEPHONE ENCOUNTER
----- Message from SCOTT Sullivan, CNS sent at 3/24/2023  4:53 PM CDT -----    Topamax   Labs for March and April in [place]   Rx for omeprazole for cost plus   Something for nausea? Tried phenergan and Zofran with no relief   Reach out to Gerda for increase in Zoloft? She will set an appt

## 2023-03-27 NOTE — TELEPHONE ENCOUNTER
Kasia,     Thanks for the update. She is well overdue for a visit with me, and she does not have one scheduled. I completely agree that she needs either in home care or to move to a facility where she can receive 24 hour care. I will include that in my note when I see her.     Gerda

## 2023-03-30 NOTE — TELEPHONE ENCOUNTER
I wish I could be of more help to this situation. It has been quite a while since I've seen Nohemy, but I know that, at least at that time, she wasn't willing to go to any type of inpatient facility. Maybe things have changed.

## 2023-03-31 ENCOUNTER — PATIENT MESSAGE (OUTPATIENT)
Dept: NEUROLOGY | Facility: CLINIC | Age: 28
End: 2023-03-31
Payer: COMMERCIAL

## 2023-03-31 RX ORDER — TOPIRAMATE 25 MG/1
25 TABLET ORAL DAILY
Qty: 30 TABLET | Refills: 11 | Status: SHIPPED | OUTPATIENT
Start: 2023-03-31 | End: 2024-03-30

## 2023-04-11 ENCOUNTER — LAB VISIT (OUTPATIENT)
Dept: LAB | Facility: HOSPITAL | Age: 28
End: 2023-04-11
Attending: CLINICAL NURSE SPECIALIST
Payer: COMMERCIAL

## 2023-04-11 ENCOUNTER — PATIENT MESSAGE (OUTPATIENT)
Dept: NEUROLOGY | Facility: CLINIC | Age: 28
End: 2023-04-11
Payer: COMMERCIAL

## 2023-04-11 DIAGNOSIS — G36.0 NEUROMYELITIS OPTICA: ICD-10-CM

## 2023-04-11 PROCEDURE — 86704 HEP B CORE ANTIBODY TOTAL: CPT | Performed by: CLINICAL NURSE SPECIALIST

## 2023-04-11 PROCEDURE — 80053 COMPREHEN METABOLIC PANEL: CPT | Performed by: CLINICAL NURSE SPECIALIST

## 2023-04-11 PROCEDURE — 87340 HEPATITIS B SURFACE AG IA: CPT | Performed by: CLINICAL NURSE SPECIALIST

## 2023-04-11 PROCEDURE — 36415 COLL VENOUS BLD VENIPUNCTURE: CPT | Mod: PO | Performed by: CLINICAL NURSE SPECIALIST

## 2023-04-11 PROCEDURE — 82784 ASSAY IGA/IGD/IGG/IGM EACH: CPT | Performed by: CLINICAL NURSE SPECIALIST

## 2023-04-11 PROCEDURE — 88184 FLOWCYTOMETRY/ TC 1 MARKER: CPT | Performed by: CLINICAL NURSE SPECIALIST

## 2023-04-11 PROCEDURE — 85025 COMPLETE CBC W/AUTO DIFF WBC: CPT | Performed by: CLINICAL NURSE SPECIALIST

## 2023-04-12 LAB
ALBUMIN SERPL BCP-MCNC: 4.1 G/DL (ref 3.5–5.2)
ALBUMIN SERPL BCP-MCNC: 4.1 G/DL (ref 3.5–5.2)
ALP SERPL-CCNC: 150 U/L (ref 55–135)
ALP SERPL-CCNC: 150 U/L (ref 55–135)
ALT SERPL W/O P-5'-P-CCNC: 7 U/L (ref 10–44)
ALT SERPL W/O P-5'-P-CCNC: 7 U/L (ref 10–44)
ANION GAP SERPL CALC-SCNC: 10 MMOL/L (ref 8–16)
ANION GAP SERPL CALC-SCNC: 10 MMOL/L (ref 8–16)
AST SERPL-CCNC: 14 U/L (ref 10–40)
AST SERPL-CCNC: 14 U/L (ref 10–40)
BASOPHILS # BLD AUTO: 0.09 K/UL (ref 0–0.2)
BASOPHILS # BLD AUTO: 0.09 K/UL (ref 0–0.2)
BASOPHILS NFR BLD: 1.3 % (ref 0–1.9)
BASOPHILS NFR BLD: 1.3 % (ref 0–1.9)
BILIRUB SERPL-MCNC: 0.3 MG/DL (ref 0.1–1)
BILIRUB SERPL-MCNC: 0.3 MG/DL (ref 0.1–1)
BUN SERPL-MCNC: 9 MG/DL (ref 6–20)
BUN SERPL-MCNC: 9 MG/DL (ref 6–20)
CALCIUM SERPL-MCNC: 9.7 MG/DL (ref 8.7–10.5)
CALCIUM SERPL-MCNC: 9.7 MG/DL (ref 8.7–10.5)
CHLORIDE SERPL-SCNC: 106 MMOL/L (ref 95–110)
CHLORIDE SERPL-SCNC: 106 MMOL/L (ref 95–110)
CO2 SERPL-SCNC: 24 MMOL/L (ref 23–29)
CO2 SERPL-SCNC: 24 MMOL/L (ref 23–29)
CREAT SERPL-MCNC: 0.9 MG/DL (ref 0.5–1.4)
CREAT SERPL-MCNC: 0.9 MG/DL (ref 0.5–1.4)
DIFFERENTIAL METHOD: ABNORMAL
DIFFERENTIAL METHOD: ABNORMAL
EOSINOPHIL # BLD AUTO: 0.1 K/UL (ref 0–0.5)
EOSINOPHIL # BLD AUTO: 0.1 K/UL (ref 0–0.5)
EOSINOPHIL NFR BLD: 1.2 % (ref 0–8)
EOSINOPHIL NFR BLD: 1.2 % (ref 0–8)
ERYTHROCYTE [DISTWIDTH] IN BLOOD BY AUTOMATED COUNT: 12.3 % (ref 11.5–14.5)
ERYTHROCYTE [DISTWIDTH] IN BLOOD BY AUTOMATED COUNT: 12.3 % (ref 11.5–14.5)
EST. GFR  (NO RACE VARIABLE): >60 ML/MIN/1.73 M^2
EST. GFR  (NO RACE VARIABLE): >60 ML/MIN/1.73 M^2
GLUCOSE SERPL-MCNC: 88 MG/DL (ref 70–110)
GLUCOSE SERPL-MCNC: 88 MG/DL (ref 70–110)
HBV CORE AB SERPL QL IA: NORMAL
HBV CORE AB SERPL QL IA: NORMAL
HBV SURFACE AG SERPL QL IA: NORMAL
HBV SURFACE AG SERPL QL IA: NORMAL
HCT VFR BLD AUTO: 38.9 % (ref 37–48.5)
HCT VFR BLD AUTO: 38.9 % (ref 37–48.5)
HGB BLD-MCNC: 12.6 G/DL (ref 12–16)
HGB BLD-MCNC: 12.6 G/DL (ref 12–16)
IGA SERPL-MCNC: 68 MG/DL (ref 40–350)
IGA SERPL-MCNC: 68 MG/DL (ref 40–350)
IGG SERPL-MCNC: 707 MG/DL (ref 650–1600)
IGG SERPL-MCNC: 707 MG/DL (ref 650–1600)
IGM SERPL-MCNC: 10 MG/DL (ref 50–300)
IGM SERPL-MCNC: 10 MG/DL (ref 50–300)
IMM GRANULOCYTES # BLD AUTO: 0.09 K/UL (ref 0–0.04)
IMM GRANULOCYTES # BLD AUTO: 0.09 K/UL (ref 0–0.04)
IMM GRANULOCYTES NFR BLD AUTO: 1.3 % (ref 0–0.5)
IMM GRANULOCYTES NFR BLD AUTO: 1.3 % (ref 0–0.5)
LYMPHOCYTES # BLD AUTO: 2.6 K/UL (ref 1–4.8)
LYMPHOCYTES # BLD AUTO: 2.6 K/UL (ref 1–4.8)
LYMPHOCYTES NFR BLD: 38.3 % (ref 18–48)
LYMPHOCYTES NFR BLD: 38.3 % (ref 18–48)
MCH RBC QN AUTO: 28.3 PG (ref 27–31)
MCH RBC QN AUTO: 28.3 PG (ref 27–31)
MCHC RBC AUTO-ENTMCNC: 32.4 G/DL (ref 32–36)
MCHC RBC AUTO-ENTMCNC: 32.4 G/DL (ref 32–36)
MCV RBC AUTO: 87 FL (ref 82–98)
MCV RBC AUTO: 87 FL (ref 82–98)
MONOCYTES # BLD AUTO: 0.7 K/UL (ref 0.3–1)
MONOCYTES # BLD AUTO: 0.7 K/UL (ref 0.3–1)
MONOCYTES NFR BLD: 9.5 % (ref 4–15)
MONOCYTES NFR BLD: 9.5 % (ref 4–15)
NEUTROPHILS # BLD AUTO: 3.3 K/UL (ref 1.8–7.7)
NEUTROPHILS # BLD AUTO: 3.3 K/UL (ref 1.8–7.7)
NEUTROPHILS NFR BLD: 48.4 % (ref 38–73)
NEUTROPHILS NFR BLD: 48.4 % (ref 38–73)
NRBC BLD-RTO: 0 /100 WBC
NRBC BLD-RTO: 0 /100 WBC
PLATELET # BLD AUTO: 287 K/UL (ref 150–450)
PLATELET # BLD AUTO: 287 K/UL (ref 150–450)
PMV BLD AUTO: 10.9 FL (ref 9.2–12.9)
PMV BLD AUTO: 10.9 FL (ref 9.2–12.9)
POTASSIUM SERPL-SCNC: 4.5 MMOL/L (ref 3.5–5.1)
POTASSIUM SERPL-SCNC: 4.5 MMOL/L (ref 3.5–5.1)
PROT SERPL-MCNC: 6.9 G/DL (ref 6–8.4)
PROT SERPL-MCNC: 6.9 G/DL (ref 6–8.4)
RBC # BLD AUTO: 4.46 M/UL (ref 4–5.4)
RBC # BLD AUTO: 4.46 M/UL (ref 4–5.4)
SODIUM SERPL-SCNC: 140 MMOL/L (ref 136–145)
SODIUM SERPL-SCNC: 140 MMOL/L (ref 136–145)
WBC # BLD AUTO: 6.87 K/UL (ref 3.9–12.7)
WBC # BLD AUTO: 6.87 K/UL (ref 3.9–12.7)

## 2023-04-13 LAB
PATH REPORT.FINAL DX SPEC: NORMAL
RITUXAN SENSITIVITY (CD20): NORMAL

## 2023-04-14 ENCOUNTER — PATIENT MESSAGE (OUTPATIENT)
Dept: NEUROLOGY | Facility: CLINIC | Age: 28
End: 2023-04-14
Payer: COMMERCIAL

## 2023-04-18 ENCOUNTER — PATIENT MESSAGE (OUTPATIENT)
Dept: NEUROLOGY | Facility: CLINIC | Age: 28
End: 2023-04-18
Payer: COMMERCIAL

## 2023-04-18 DIAGNOSIS — F06.34 MOOD DISORDER WITH MIXED FEATURES DUE TO GENERAL MEDICAL CONDITION: ICD-10-CM

## 2023-04-18 DIAGNOSIS — R11.0 CHRONIC NAUSEA: Primary | ICD-10-CM

## 2023-04-18 DIAGNOSIS — F41.9 ANXIETY: ICD-10-CM

## 2023-04-26 ENCOUNTER — PATIENT MESSAGE (OUTPATIENT)
Dept: NEUROLOGY | Facility: CLINIC | Age: 28
End: 2023-04-26
Payer: COMMERCIAL

## 2023-04-28 RX ORDER — ONDANSETRON 4 MG/1
4 TABLET, ORALLY DISINTEGRATING ORAL EVERY 12 HOURS PRN
Qty: 60 TABLET | Refills: 1 | Status: SHIPPED | OUTPATIENT
Start: 2023-04-28

## 2023-04-28 RX ORDER — SERTRALINE HYDROCHLORIDE 100 MG/1
100 TABLET, FILM COATED ORAL DAILY
Qty: 90 TABLET | Refills: 1 | Status: SHIPPED | OUTPATIENT
Start: 2023-04-28 | End: 2023-10-25

## 2023-05-02 ENCOUNTER — PATIENT MESSAGE (OUTPATIENT)
Dept: NEUROLOGY | Facility: CLINIC | Age: 28
End: 2023-05-02
Payer: COMMERCIAL

## 2023-05-05 ENCOUNTER — DOCUMENTATION ONLY (OUTPATIENT)
Dept: NEUROLOGY | Facility: CLINIC | Age: 28
End: 2023-05-05
Payer: COMMERCIAL

## 2023-05-17 ENCOUNTER — PATIENT MESSAGE (OUTPATIENT)
Dept: NEUROLOGY | Facility: CLINIC | Age: 28
End: 2023-05-17
Payer: COMMERCIAL

## 2023-05-17 NOTE — TELEPHONE ENCOUNTER
Spoke with Yu at [facility] beds at the [facility] and cancelled authorization number [id number] as of 5/16/23.   Notified Julienne at [facility].

## 2023-05-23 NOTE — TELEPHONE ENCOUNTER
Received a message from Nohemy's mom that she has been discharged to home. We will plan for MRI brain as scheduled here, but will see if they are able to move up the date.       
I received the following message from Nohemy's mom. I asked her to keep us informed of what is going on.     This actually is urgent!!  Nohemy was better for a week and then yesterday she started acting like she had been , as though the med was nt working anymore. She started saying she wanted to  kill her self again so I called my . He came kbed and he. Called the police and had an ambulance come just like y'all told us to do. This was late last night. She went to our hospital and they transferred her to [place]. She has been taken to [facility]. They just took her phone. I dint know how long she'll be there. They have to access her. Wanted to give you an update.     Donna Wilberto         Notified that patient ahs been admitted involuntarily to [facility] after parents called 911 due to her becoming acutely suicidal. Apparently the patient has not allowed parents to speak with team and they do not know what her status is. Due to [place] being in other state, it would not be feasible to transfer her as involuntary patient across the state lines. Called [facility] and left my name and contact number with nursing station, identifying myself as her outpatient psychiatrist and offering to assist in any way possible.  
23-May-2023 16:59

## 2023-07-20 ENCOUNTER — PATIENT MESSAGE (OUTPATIENT)
Dept: NEUROLOGY | Facility: CLINIC | Age: 28
End: 2023-07-20
Payer: COMMERCIAL

## 2023-07-28 ENCOUNTER — TELEPHONE (OUTPATIENT)
Dept: NEUROLOGY | Facility: CLINIC | Age: 28
End: 2023-07-28
Payer: COMMERCIAL

## 2023-07-28 NOTE — TELEPHONE ENCOUNTER
Called pt's father, Tanner, to confirm pt's appt with Dr. Guadarrama on 7/31. Pt's father stated they had to move pt out of state to [place] and she will begin to receive care there soon. Tanner stated her new care team will begin requesting pt's records from us shortly.

## 2024-10-01 ENCOUNTER — PATIENT MESSAGE (OUTPATIENT)
Dept: RESEARCH | Facility: HOSPITAL | Age: 29
End: 2024-10-01
Payer: COMMERCIAL

## 2025-01-07 ENCOUNTER — PATIENT MESSAGE (OUTPATIENT)
Dept: RESEARCH | Facility: HOSPITAL | Age: 30
End: 2025-01-07
Payer: COMMERCIAL